# Patient Record
Sex: FEMALE | Race: BLACK OR AFRICAN AMERICAN | NOT HISPANIC OR LATINO | Employment: FULL TIME | URBAN - METROPOLITAN AREA
[De-identification: names, ages, dates, MRNs, and addresses within clinical notes are randomized per-mention and may not be internally consistent; named-entity substitution may affect disease eponyms.]

---

## 2017-05-30 ENCOUNTER — OFFICE VISIT (OUTPATIENT)
Dept: FAMILY MEDICINE | Facility: CLINIC | Age: 27
End: 2017-05-30
Payer: MEDICAID

## 2017-05-30 VITALS
DIASTOLIC BLOOD PRESSURE: 74 MMHG | HEIGHT: 66 IN | HEART RATE: 84 BPM | SYSTOLIC BLOOD PRESSURE: 115 MMHG | WEIGHT: 195 LBS | TEMPERATURE: 98.6 F | BODY MASS INDEX: 31.34 KG/M2

## 2017-05-30 DIAGNOSIS — M54.6 ACUTE LEFT-SIDED THORACIC BACK PAIN: Primary | ICD-10-CM

## 2017-05-30 PROCEDURE — 99213 OFFICE O/P EST LOW 20 MIN: CPT | Performed by: PHYSICIAN ASSISTANT

## 2017-05-30 RX ORDER — METHOCARBAMOL 500 MG/1
1000 TABLET, FILM COATED ORAL 3 TIMES DAILY PRN
Qty: 30 TABLET | Refills: 1 | Status: SHIPPED | OUTPATIENT
Start: 2017-05-30 | End: 2017-09-22

## 2017-05-30 RX ORDER — ACETAMINOPHEN 500 MG
1000 TABLET ORAL 2 TIMES DAILY PRN
Qty: 100 TABLET | Refills: 0 | Status: SHIPPED | OUTPATIENT
Start: 2017-05-30 | End: 2017-09-22

## 2017-05-30 ASSESSMENT — PAIN SCALES - GENERAL: PAINLEVEL: SEVERE PAIN (7)

## 2017-05-30 NOTE — PROGRESS NOTES
SUBJECTIVE:                                                    Peewee Wilson is a 27 year old female who presents to clinic today for the following health issues:      Back Pain      Duration: Yesterday at work        Specific cause: work-related    Description:   Location of pain: middle of back left and upper back left  Started after lifting boxes at work  Character of pain: sharp and dull ache  Pain radiation:up into the neck  New numbness or weakness in legs, not attributed to pain:  no     Intensity: Currently 7/10, At its worst 10/10    History:   Pain interferes with job: YES  History of back problems: no prior back problems  Any previous MRI or X-rays: None  Sees a specialist for back pain:  No  Therapies tried without relief: NSAIDs-not helping   Hot bath-worse     Alleviating factors:   Improved by: none      Precipitating factors:  Worsened by: Lifting and Bending    Functional and Psychosocial Screen (Marylu STarT Back):      Not performed today       Accompanying Signs & Symptoms:  Risk of Fracture:  None  Risk of Cauda Equina:  None  Risk of Infection:  None  Risk of Cancer:  None  Risk of Ankylosing Spondylitis:  Onset at age <35, male, AND morning back stiffness. no                          Problem list and histories reviewed & adjusted, as indicated.  Additional history: as documented    There is no problem list on file for this patient.    Past Surgical History:   Procedure Laterality Date     ABDOMEN SURGERY      2 c-sections     GYN SURGERY      dermoid cyst       Social History   Substance Use Topics     Smoking status: Former Smoker     Smokeless tobacco: Not on file     Alcohol use No     History reviewed. No pertinent family history.        Reviewed and updated as needed this visit by clinical staff  Tobacco  Allergies  Meds  Med Hx  Surg Hx  Fam Hx  Soc Hx      Reviewed and updated as needed this visit by Provider  Allergies         ROS:  As above    OBJECTIVE:                         "                            /74 (BP Location: Right arm, Patient Position: Chair, Cuff Size: Adult Large)  Pulse 84  Temp 98.6  F (37  C) (Oral)  Ht 5' 6\" (1.676 m)  Wt 195 lb (88.5 kg)  Breastfeeding? No  BMI 31.47 kg/m2  Body mass index is 31.47 kg/(m^2).  GENERAL: healthy, alert and no distress  RESP: lungs clear to auscultation - no rales, rhonchi or wheezes  CV: regular rates and rhythm, normal S1 S2, no S3 or S4 and no murmur, click or rub  MS: tender over left shoulder blade     Diagnostic Test Results:  none      ASSESSMENT/PLAN:                                                      1. Acute left-sided thoracic back pain  Continue symptomatic treatment.  return to clinic if not improving.     - methocarbamol (ROBAXIN) 500 MG tablet; Take 2 tablets (1,000 mg) by mouth 3 times daily as needed for muscle spasms  Dispense: 30 tablet; Refill: 1  - acetaminophen (TYLENOL) 500 MG tablet; Take 2 tablets (1,000 mg) by mouth 2 times daily as needed for mild pain  Dispense: 100 tablet; Refill: 0        Becky Gilbert PA-C  [facility]  "

## 2017-05-30 NOTE — LETTER
[address] [address]  Phone: [phone]  Fax: [phone]    May 30, 2017        Peewee Wilson  [address] [place] [id number]          To whom it may concern:    RE: Peewee Wilson    Patient was seen and treated today at our clinic and missed work.    Please contact me for questions or concerns.      Sincerely,        Becky Gilbert PA-C

## 2017-05-30 NOTE — MR AVS SNAPSHOT
"              After Visit Summary   2017    Peewee Wilson    MRN: [id number]           Patient Information     Date Of Birth          1990        Visit Information        Provider Department      2017 1:00 PM Becky Gilbert PA-C [facility]        Today's Diagnoses     Acute left-sided thoracic back pain    -  1       Follow-ups after your visit        Who to contact     If you have questions or need follow up information about today's clinic visit or your schedule please contact [facility] directly at [phone].  Normal or non-critical lab and imaging results will be communicated to you by [employer], letter or phone within 4 business days after the clinic has received the results. If you do not hear from us within 7 days, please contact the clinic through [employer] or phone. If you have a critical or abnormal lab result, we will notify you by phone as soon as possible.  Submit refill requests through [employer] or call your pharmacy and they will forward the refill request to us. Please allow 3 business days for your refill to be completed.          Additional Information About Your Visit        MyChart Information     [employer] lets you send messages to your doctor, view your test results, renew your prescriptions, schedule appointments and more. To sign up, go to [email or web address] . Click on \"Log in\" on the left side of the screen, which will take you to the Welcome page. Then click on \"Sign up Now\" on the right side of the page.     You will be asked to enter the access code listed below, as well as some personal information. Please follow the directions to create your username and password.     Your access code is: [email or web address]  Expires: 2017  1:37 PM     Your access code will  in 90 days. If you need help or a new code, please call your [facility] or [phone].        Care EveryWhere ID     This is your Care EveryWhere ID. " "This could be used by other organizations to access your [place] medical records  [id number]        Your Vitals Were     Pulse Temperature Height Breastfeeding? BMI (Body Mass Index)       84 98.6  F (37  C) (Oral) 5' 6\" (1.676 m) No 31.47 kg/m2        Blood Pressure from Last 3 Encounters:   05/30/17 115/74   10/31/16 116/73    Weight from Last 3 Encounters:   05/30/17 195 lb (88.5 kg)   10/31/16 183 lb 3.2 oz (83.1 kg)              Today, you had the following     No orders found for display         Today's Medication Changes          These changes are accurate as of: 5/30/17  1:37 PM.  If you have any questions, ask your nurse or doctor.               Start taking these medicines.        Dose/Directions    acetaminophen 500 MG tablet   Commonly known as:  TYLENOL   Used for:  Acute left-sided thoracic back pain   Started by:  Becky Gilbert PA-C        Dose:  1000 mg   Take 2 tablets (1,000 mg) by mouth 2 times daily as needed for mild pain   Quantity:  100 tablet   Refills:  0       methocarbamol 500 MG tablet   Commonly known as:  ROBAXIN   Used for:  Acute left-sided thoracic back pain   Started by:  Becky Gilbert PA-C        Dose:  1000 mg   Take 2 tablets (1,000 mg) by mouth 3 times daily as needed for muscle spasms   Quantity:  30 tablet   Refills:  1            Where to get your medicines      These medications were sent to [facility] - [place], [place] - [address]  [address], [facility] [address]     Phone:  [phone]     acetaminophen 500 MG tablet    methocarbamol 500 MG tablet                Primary Care Provider    Physician No Ref-Primary       No address on file        Thank you!     Thank you for choosing [facility]  for your care. Our goal is always to provide you with excellent care. Hearing back from our patients is one way we can continue to improve our services. Please take a few minutes to " complete the written survey that you may receive in the mail after your visit with us. Thank you!             Your Updated Medication List - Protect others around you: Learn how to safely use, store and throw away your medicines at www.disposemymeds.org.          This list is accurate as of: 5/30/17  1:37 PM.  Always use your most recent med list.                   Brand Name Dispense Instructions for use    acetaminophen 500 MG tablet    TYLENOL    100 tablet    Take 2 tablets (1,000 mg) by mouth 2 times daily as needed for mild pain       methocarbamol 500 MG tablet    ROBAXIN    30 tablet    Take 2 tablets (1,000 mg) by mouth 3 times daily as needed for muscle spasms

## 2017-05-30 NOTE — NURSING NOTE
"Chief Complaint   Patient presents with     Back Pain       Initial /74 (BP Location: Right arm, Patient Position: Chair, Cuff Size: Adult Large)  Pulse 84  Temp 98.6  F (37  C) (Oral)  Ht 5' 6\" (1.676 m)  Wt 195 lb (88.5 kg)  Breastfeeding? No  BMI 31.47 kg/m2 Estimated body mass index is 31.47 kg/(m^2) as calculated from the following:    Height as of this encounter: 5' 6\" (1.676 m).    Weight as of this encounter: 195 lb (88.5 kg).  Medication Reconciliation: complete   Soco Reed CMA       "

## 2017-09-22 ENCOUNTER — OFFICE VISIT (OUTPATIENT)
Dept: OBGYN | Facility: CLINIC | Age: 27
End: 2017-09-22
Payer: COMMERCIAL

## 2017-09-22 VITALS
WEIGHT: 195.1 LBS | HEIGHT: 66 IN | DIASTOLIC BLOOD PRESSURE: 70 MMHG | SYSTOLIC BLOOD PRESSURE: 112 MMHG | HEART RATE: 71 BPM | OXYGEN SATURATION: 99 % | BODY MASS INDEX: 31.36 KG/M2

## 2017-09-22 DIAGNOSIS — B96.89 BV (BACTERIAL VAGINOSIS): Primary | ICD-10-CM

## 2017-09-22 DIAGNOSIS — N89.8 VAGINAL DISCHARGE: ICD-10-CM

## 2017-09-22 DIAGNOSIS — K64.4 EXTERNAL HEMORRHOIDS: ICD-10-CM

## 2017-09-22 DIAGNOSIS — Z11.3 SCREEN FOR STD (SEXUALLY TRANSMITTED DISEASE): Primary | ICD-10-CM

## 2017-09-22 DIAGNOSIS — N76.0 BV (BACTERIAL VAGINOSIS): Primary | ICD-10-CM

## 2017-09-22 LAB
SPECIMEN SOURCE: ABNORMAL
WET PREP SPEC: ABNORMAL

## 2017-09-22 PROCEDURE — 86780 TREPONEMA PALLIDUM: CPT | Performed by: OBSTETRICS & GYNECOLOGY

## 2017-09-22 PROCEDURE — 36415 COLL VENOUS BLD VENIPUNCTURE: CPT | Performed by: OBSTETRICS & GYNECOLOGY

## 2017-09-22 PROCEDURE — 87491 CHLMYD TRACH DNA AMP PROBE: CPT | Performed by: OBSTETRICS & GYNECOLOGY

## 2017-09-22 PROCEDURE — 87591 N.GONORRHOEAE DNA AMP PROB: CPT | Performed by: OBSTETRICS & GYNECOLOGY

## 2017-09-22 PROCEDURE — 87529 HSV DNA AMP PROBE: CPT | Performed by: OBSTETRICS & GYNECOLOGY

## 2017-09-22 PROCEDURE — 87210 SMEAR WET MOUNT SALINE/INK: CPT | Performed by: OBSTETRICS & GYNECOLOGY

## 2017-09-22 PROCEDURE — 87389 HIV-1 AG W/HIV-1&-2 AB AG IA: CPT | Performed by: OBSTETRICS & GYNECOLOGY

## 2017-09-22 PROCEDURE — 87529 HSV DNA AMP PROBE: CPT | Mod: 91 | Performed by: OBSTETRICS & GYNECOLOGY

## 2017-09-22 PROCEDURE — 99214 OFFICE O/P EST MOD 30 MIN: CPT | Performed by: OBSTETRICS & GYNECOLOGY

## 2017-09-22 PROCEDURE — G0499 HEPB SCREEN HIGH RISK INDIV: HCPCS | Performed by: OBSTETRICS & GYNECOLOGY

## 2017-09-22 RX ORDER — METRONIDAZOLE 500 MG/1
500 TABLET ORAL 2 TIMES DAILY
Qty: 14 TABLET | Refills: 0 | Status: SHIPPED | OUTPATIENT
Start: 2017-09-22 | End: 2017-12-28

## 2017-09-22 NOTE — NURSING NOTE
"Chief Complaint   Patient presents with     STD     std screening     Consult     vaginal pain/discharge       Initial /70  Pulse 71  Ht 1.67 m (5' 5.75\")  Wt 88.5 kg (195 lb 1.6 oz)  LMP 09/16/2017  SpO2 99%  Breastfeeding? No  BMI 31.73 kg/m2 Estimated body mass index is 31.73 kg/(m^2) as calculated from the following:    Height as of this encounter: 1.67 m (5' 5.75\").    Weight as of this encounter: 88.5 kg (195 lb 1.6 oz).  Medication Reconciliation:   Shivani Valentin, [facility]  September 22, 2017 3:42 PM            "

## 2017-09-22 NOTE — PATIENT INSTRUCTIONS
If you have any questions regarding your visit, Please contact your care team.    Women s Health CLINIC HOURS TELEPHONE NUMBER   Diana DO Konrad.    [facility] -    KATHY Stuart RN       Monday, Wednesday, Thursday and Friday, [place]  8:30a.m-5:00 p.m   [facility]  [address] [place] [address]  [phone] ask for [facility]    Imaging Rqtobxfbon-[phone]       Urgent Care locations:    [facility] Saturday and Sunday   9 am - 5 pm    Monday-Friday   12 pm - 8 pm  Saturday and Sunday   9 am - 5 pm   [phone]     [facility] Labor and Delivery:  [phone]    If you need a medication refill, please contact your pharmacy. Please allow 3 business days for your refill to be completed.  As always, Thank you for trusting us with your healthcare needs!

## 2017-09-22 NOTE — PROGRESS NOTES
"This 26 y/o female, , not using contraception, who presents for a STD screening.  She is monogomous with her children's father but they break up periodically and then he \"sleeps around.\"  Therefore, she would like to be checked for STDs today but denies any known exposure.  She also denies any symptoms or pelvic pain.  She states that her labia have enlarged since pregnancy and at times feel irritated.  She also c/o hemorrhoid problems and would like a referral to have this evaluated.  Her next annual exam is due 17 so she plans to call back and schedule.  She remains undecided as to whether or not to have more children.  She declines a flu vaccine today.  /70  Pulse 71  Ht 1.67 m (5' 5.75\")  Wt 88.5 kg (195 lb 1.6 oz)  LMP 2017  SpO2 99%  Breastfeeding? No  BMI 31.73 kg/m2   ROS:  10 systems were reviewed and was + for vaginal discharge   A bi-valve speculum was placed and a wet prep and GC/Chlamydia cultures were obtained and submitted.  A small amount of white vaginal discharge was visualized but no lesions or growths were seen.  Her pelvic exam was negative for uterine tenderness or palpable adnexal mass/tenderness.  Assessment - STD screening, hemorrhoid issue  Plan - Submit wet prep and STD tests and treat if +.  We discussed safe sex measures and she is to use condoms every time.  Will refer to [facility] for hemorrhoid issue but they are not thrombosed.  She is to return in 2017 for her next annual exam.  This was a 30-minute visit and over 50% of the time was spent in direct pt consultation.  "

## 2017-09-22 NOTE — MR AVS SNAPSHOT
After Visit Summary   9/22/2017    Peewee Wilson    MRN: [id number]           Patient Information     Date Of Birth          1990        Visit Information        Provider Department      9/22/2017 3:30 PM Diana Silvestre DO [facility]        Today's Diagnoses     Screen for STD (sexually transmitted disease)    -  1    Vaginal discharge          Care Instructions                                                         If you have any questions regarding your visit, Please contact your care team.    [facility] CLINIC HOURS TELEPHONE NUMBER   Diana Silvestre DO.    [facility] -    KATHY Stuart RN       [facility], [facility]  8:30a.m-5:00 p.m   [facility]  [address] [place] [address]  [phone] ask for [facility]    Imaging Iayoimezap-[phone]       Urgent Care locations:    [facility] Saturday and Sunday   9 am - 5 pm    Monday-Friday   12 pm - 8 pm  Saturday and Sunday   9 am - 5 pm   [phone]     [facility] Labor and Delivery:  [phone]    If you need a medication refill, please contact your pharmacy. Please allow 3 business days for your refill to be completed.  As always, Thank you for trusting us with your healthcare needs!                Follow-ups after your visit        Who to contact     If you have questions or need follow up information about today's clinic visit or your schedule please contact [facility] directly at [phone].  Normal or non-critical lab and imaging results will be communicated to you by MyChart, letter or phone within 4 business days after the clinic has received the results. If you do not hear from us within 7 days, please contact the clinic through MyChart or phone. If you have a critical or abnormal lab result, we will notify you by phone as soon as  "possible.  Submit refill requests through [employer] or call your pharmacy and they will forward the refill request to us. Please allow 3 business days for your refill to be completed.          Additional Information About Your Visit        [employer] Information     [employer] lets you send messages to your doctor, view your test results, renew your prescriptions, schedule appointments and more. To sign up, go to [email or web address] . Click on \"Log in\" on the left side of the screen, which will take you to the Welcome page. Then click on \"Sign up Now\" on the right side of the page.     You will be asked to enter the access code listed below, as well as some personal information. Please follow the directions to create your username and password.     Your access code is: [email or web address]  Expires: 2017  3:42 PM     Your access code will  in 90 days. If you need help or a new code, please call your [place] clinic or [phone].        Care EveryWhere ID     This is your Care EveryWhere ID. This could be used by other organizations to access your [place] medical records  [id number]        Your Vitals Were     Pulse Height Last Period Pulse Oximetry Breastfeeding? BMI (Body Mass Index)    71 1.67 m (5' 5.75\") 2017 99% No 31.73 kg/m2       Blood Pressure from Last 3 Encounters:   17 112/70   17 115/74   10/31/16 116/73    Weight from Last 3 Encounters:   17 88.5 kg (195 lb 1.6 oz)   17 88.5 kg (195 lb)   10/31/16 83.1 kg (183 lb 3.2 oz)              We Performed the Following     Anti Treponema     Hepatitis B surface antigen     HIV Antigen Antibody Combo     HSV 1 and 2 DNA by PCR     Wet prep          Today's Medication Changes          These changes are accurate as of: 17  3:42 PM.  If you have any questions, ask your nurse or doctor.               Stop taking these medicines if you haven't already. Please contact your care team if you have questions.     acetaminophen 500 " MG tablet   Commonly known as:  TYLENOL   Stopped by:  Diana Silvestre DO           methocarbamol 500 MG tablet   Commonly known as:  ROBAXIN   Stopped by:  Diana Silvestre DO                    Primary Care Provider    [facility]       No address on file        Equal Access to Services     DAVIDMAJOR ALANIS AH: Hadii susan brooke hadmilenao Soomaali, waaxda luqadaha, qaybta kaalmada adeegyada, waxay idiin hayaan adeeg khtiaraalfredo mcgraw. So [facility] [phone].    ATENCIÓN: Si habla español, tiene a james disposición servicios gratuitos de asistencia lingüística. Llame al [phone].    We comply with applicable federal civil rights laws and [place] laws. We do not discriminate on the basis of race, color, national origin, age, disability sex, sexual orientation or gender identity.            Thank you!     Thank you for choosing [facility]  for your care. Our goal is always to provide you with excellent care. Hearing back from our patients is one way we can continue to improve our services. Please take a few minutes to complete the written survey that you may receive in the mail after your visit with us. Thank you!             Your Updated Medication List - Protect others around you: Learn how to safely use, store and throw away your medicines at www.disposemymeds.org.          This list is accurate as of: 9/22/17  3:42 PM.  Always use your most recent med list.                   Brand Name Dispense Instructions for use Diagnosis    amoxicillin-clavulanate 875-125 MG per tablet    AUGMENTIN     TK 1 T PO BID FOR 10 DAYS

## 2017-09-23 LAB — T PALLIDUM IGG+IGM SER QL: NEGATIVE

## 2017-09-24 LAB
C TRACH DNA SPEC QL NAA+PROBE: NEGATIVE
HSV1 DNA SPEC QL NAA+PROBE: NEGATIVE
HSV2 DNA SPEC QL NAA+PROBE: NEGATIVE
N GONORRHOEA DNA SPEC QL NAA+PROBE: NEGATIVE
SPECIMEN SOURCE: NORMAL

## 2017-09-25 LAB
HBV SURFACE AG SERPL QL IA: NONREACTIVE
HIV 1+2 AB+HIV1 P24 AG SERPL QL IA: NONREACTIVE

## 2017-12-28 ENCOUNTER — OFFICE VISIT (OUTPATIENT)
Dept: FAMILY MEDICINE | Facility: CLINIC | Age: 27
End: 2017-12-28
Payer: COMMERCIAL

## 2017-12-28 VITALS
OXYGEN SATURATION: 98 % | DIASTOLIC BLOOD PRESSURE: 65 MMHG | BODY MASS INDEX: 32.69 KG/M2 | TEMPERATURE: 98.2 F | HEART RATE: 82 BPM | SYSTOLIC BLOOD PRESSURE: 110 MMHG | WEIGHT: 201 LBS

## 2017-12-28 DIAGNOSIS — N92.6 MISSED PERIOD: Primary | ICD-10-CM

## 2017-12-28 DIAGNOSIS — Z32.01 PREGNANCY CONFIRMED BY POSITIVE URINE TEST: ICD-10-CM

## 2017-12-28 LAB — BETA HCG QUAL IFA URINE: POSITIVE

## 2017-12-28 PROCEDURE — 99214 OFFICE O/P EST MOD 30 MIN: CPT | Performed by: FAMILY MEDICINE

## 2017-12-28 PROCEDURE — 84703 CHORIONIC GONADOTROPIN ASSAY: CPT | Performed by: FAMILY MEDICINE

## 2017-12-28 RX ORDER — PRENATAL VIT/IRON FUM/FOLIC AC 27MG-0.8MG
1 TABLET ORAL DAILY
Qty: 100 TABLET | Refills: 3 | Status: SHIPPED | OUTPATIENT
Start: 2017-12-28 | End: 2019-02-21

## 2017-12-28 ASSESSMENT — PAIN SCALES - GENERAL: PAINLEVEL: SEVERE PAIN (6)

## 2017-12-28 NOTE — NURSING NOTE
"Chief Complaint   Patient presents with     Abdominal Pain     Positive pregnancy test last night     Pelvic Pain       Initial /65 (BP Location: Left arm, Patient Position: Chair, Cuff Size: Adult Regular)  Pulse 82  Temp 98.2  F (36.8  C) (Oral)  Wt 201 lb (91.2 kg)  SpO2 98%  BMI 32.69 kg/m2 Estimated body mass index is 32.69 kg/(m^2) as calculated from the following:    Height as of 9/22/17: 5' 5.75\" (1.67 m).    Weight as of this encounter: 201 lb (91.2 kg).  Medication Reconciliation: complete   Alejandra Giang CMA       "

## 2017-12-28 NOTE — MR AVS SNAPSHOT
After Visit Summary   12/28/2017    Peewee Wilson    MRN: [id number]           Patient Information     Date Of Birth          1990        Visit Information        Provider Department      12/28/2017 3:00 PM Hussain Gracia MD [facility]        Today's Diagnoses     Missed period    -  1    Pregnancy confirmed by positive urine test           Follow-ups after your visit        Follow-up notes from your care team     Return if symptoms worsen or fail to improve.      Who to contact     If you have questions or need follow up information about today's clinic visit or your schedule please contact [facility] directly at [phone].  Normal or non-critical lab and imaging results will be communicated to you by [employer], letter or phone within 4 business days after the clinic has received the results. If you do not hear from us within 7 days, please contact the clinic through [employer] or phone. If you have a critical or abnormal lab result, we will notify you by phone as soon as possible.  Submit refill requests through [employer] or call your pharmacy and they will forward the refill request to us. Please allow 3 business days for your refill to be completed.          Additional Information About Your Visit        MyChart Information     [employer] gives you secure access to your electronic health record. If you see a primary care provider, you can also send messages to your care team and make appointments. If you have questions, please call your primary care clinic.  If you do not have a primary care provider, please call [phone] and they will assist you.        Care EveryWhere ID     This is your Care EveryWhere ID. This could be used by other organizations to access your [place] medical records  [id number]        Your Vitals Were     Pulse Temperature Pulse Oximetry BMI (Body Mass Index)          82 98.2  F (36.8  C) (Oral) 98% 32.69 kg/m2          Blood Pressure from Last 3 Encounters:   12/28/17 110/65   09/22/17 112/70   05/30/17 115/74    Weight from Last 3 Encounters:   12/28/17 201 lb (91.2 kg)   09/22/17 195 lb 1.6 oz (88.5 kg)   05/30/17 195 lb (88.5 kg)              We Performed the Following     Beta HCG qual IFA urine - [facility] and [place]          Today's Medication Changes          These changes are accurate as of: 12/28/17  3:39 PM.  If you have any questions, ask your nurse or doctor.               Start taking these medicines.        Dose/Directions    prenatal multivitamin plus iron 27-0.8 MG Tabs per tablet   Used for:  Pregnancy confirmed by positive urine test   Started by:  Hussain Gracia MD        Dose:  1 tablet   Take 1 tablet by mouth daily   Quantity:  100 tablet   Refills:  3            Where to get your medicines      These medications were sent to [facility] - [place], [place] - [address]  [address], [facility] [address]     Phone:  [phone]     prenatal multivitamin plus iron 27-0.8 MG Tabs per tablet                Primary Care Provider Office Phone # Fax #    [facility] [phone]       [address]  [facility] [address]        Equal Access to Services     RUPAL THAPA AH: Hadii susan ku hadasho Soomaali, waaxda luqadaha, qaybta kaalmada adeegyada, waxay idiin hayaan boris khcezar mcgraw. So [facility] [phone].    ATENCIÓN: Si habla español, tiene a james disposición servicios gratuitos de asistencia lingüística. Llame al [phone].    We comply with applicable federal civil rights laws and [place] laws. We do not discriminate on the basis of race, color, national origin, age, disability, sex, sexual orientation, or gender identity.            Thank you!     Thank you for choosing [facility]  for your care. Our goal is always to provide you with excellent care. Hearing back from our patients is one way  we can continue to improve our services. Please take a few minutes to complete the written survey that you may receive in the mail after your visit with us. Thank you!             Your Updated Medication List - Protect others around you: Learn how to safely use, store and throw away your medicines at www.disposemymeds.org.          This list is accurate as of: 12/28/17  3:39 PM.  Always use your most recent med list.                   Brand Name Dispense Instructions for use Diagnosis    prenatal multivitamin plus iron 27-0.8 MG Tabs per tablet     100 tablet    Take 1 tablet by mouth daily    Pregnancy confirmed by positive urine test

## 2017-12-28 NOTE — PROGRESS NOTES
SUBJECTIVE:   Peewee Wilson is a 27 year old female who presents to clinic today for the following health issues:      Concern - Abdominal cramping, tightness  Onset: 2 weeks    Description:   Positive home pregnancy test last night    Intensity: moderate    Progression of Symptoms:  same    Accompanying Signs & Symptoms:  None- comes and goes    Previous history of similar problem:       Precipitating factors:   Worsened by:     Alleviating factors:  Improved by:     Therapies Tried and outcome: None    She has had some mild lower abdominal/pelvic discomfort and cramping off and on in the last couple of weeks.  Her last menstrual period was November 28.  She normally has a menstrual period every 4 weeks or so.  She was expecting her menstrual period around Cyndi day, which was 3 days ago, but it did not happen.  She did a couple of home pregnancy tests yesterday which were both positive.  She has 2 children.  They will be 7 and 8 years of age this year.  She is not , but is still in a relationship with the father of these 2 children and he would also be the father of any new pregnancy for her.  She is not having any vaginal bleeding in the last week or 2.  She is on no routine prescription medications.  She is not taking any prenatal vitamins.    Problem list and histories reviewed & adjusted, as indicated.  Additional history: as documented    There is no problem list on file for this patient.    Past Surgical History:   Procedure Laterality Date     ABDOMEN SURGERY      2 c-sections     GYN SURGERY      dermoid cyst       Social History   Substance Use Topics     Smoking status: Former Smoker     Smokeless tobacco: Never Used     Alcohol use No     History reviewed. No pertinent family history.      Current Outpatient Prescriptions   Medication Sig Dispense Refill    none        No Known Allergies      Reviewed and updated as needed this visit by clinical staffTobacco  Allergies  Meds  Med Hx   "Surg Hx  Fam Hx  Soc Hx      Reviewed and updated as needed this visit by Provider         ROS:  Noncontributory except as above    OBJECTIVE:     /65 (BP Location: Left arm, Patient Position: Chair, Cuff Size: Adult Regular)  Pulse 82  Temp 98.2  F (36.8  C) (Oral)  Wt 201 lb (91.2 kg)  SpO2 98%  BMI 32.69 kg/m2  Body mass index is 32.69 kg/(m^2).  GENERAL: alert, no distress and obese  ABDOMEN: Soft without any specific localizing tenderness to palpation over the lower abdomen or suprapubic region.  She points to the suprapubic region as where she has had some \"tightness\" recently.  No guarding or rebound or mass there.    Diagnostic Test Results:  Office Visit on 12/28/2017   Component Date Value Ref Range Status     Beta HCG Qual IFA Urine 12/28/2017 Positive* NEG^Negative    Final     ]    ASSESSMENT/PLAN:       ICD-10-CM    1. Missed period N92.6 Beta HCG qual IFA urine - [facility] and [place]   2. Pregnancy confirmed by positive urine test Z32.01 Prenatal Vit-Fe Fumarate-FA (PRENATAL MULTIVITAMIN PLUS IRON) 27-0.8 MG TABS per tablet     We did confirm a new diagnosis of pregnancy for her  Her LMP was 11/28 so her EDC is about 9/4 of 2018  I reviewed some routine guidelines for care of an early pregnancy including the initiation of prenatal vitamins for her  She had her previous pregnancies cared for by [facility], but I gave her some options locally through our [facility] for care of her pregnancy and I advised her to follow-up with an obstetrician in the upcoming weeks for ongoing care of this pregnancy        Hussain Gracia MD  [facility]  "

## 2018-01-12 ENCOUNTER — PRENATAL OFFICE VISIT (OUTPATIENT)
Dept: OBGYN | Facility: CLINIC | Age: 28
End: 2018-01-12
Payer: COMMERCIAL

## 2018-01-12 VITALS
HEART RATE: 74 BPM | WEIGHT: 199.1 LBS | DIASTOLIC BLOOD PRESSURE: 74 MMHG | BODY MASS INDEX: 32 KG/M2 | HEIGHT: 66 IN | SYSTOLIC BLOOD PRESSURE: 121 MMHG

## 2018-01-12 DIAGNOSIS — O34.219 HISTORY OF CESAREAN DELIVERY, CURRENTLY PREGNANT: Primary | ICD-10-CM

## 2018-01-12 LAB
ALBUMIN UR-MCNC: NEGATIVE MG/DL
APPEARANCE UR: CLEAR
BACTERIA #/AREA URNS HPF: ABNORMAL /HPF
BILIRUB UR QL STRIP: NEGATIVE
COLOR UR AUTO: ABNORMAL
ERYTHROCYTE [DISTWIDTH] IN BLOOD BY AUTOMATED COUNT: 12.2 % (ref 10–15)
GLUCOSE UR STRIP-MCNC: NEGATIVE MG/DL
HBA1C MFR BLD: 4.9 % (ref 4.3–6)
HCT VFR BLD AUTO: 37.7 % (ref 35–47)
HGB BLD-MCNC: 12.5 G/DL (ref 11.7–15.7)
HGB UR QL STRIP: NEGATIVE
KETONES UR STRIP-MCNC: NEGATIVE MG/DL
LEUKOCYTE ESTERASE UR QL STRIP: NEGATIVE
MCH RBC QN AUTO: 28.7 PG (ref 26.5–33)
MCHC RBC AUTO-ENTMCNC: 33.2 G/DL (ref 31.5–36.5)
MCV RBC AUTO: 87 FL (ref 78–100)
NITRATE UR QL: NEGATIVE
NON-SQ EPI CELLS #/AREA URNS LPF: ABNORMAL /LPF
PH UR STRIP: 6 PH (ref 5–7)
PLATELET # BLD AUTO: 293 10E9/L (ref 150–450)
RBC # BLD AUTO: 4.36 10E12/L (ref 3.8–5.2)
RBC #/AREA URNS AUTO: ABNORMAL /HPF
SOURCE: ABNORMAL
SP GR UR STRIP: 1.01 (ref 1–1.03)
UROBILINOGEN UR STRIP-MCNC: NORMAL MG/DL (ref 0–2)
WBC # BLD AUTO: 9.3 10E9/L (ref 4–11)
WBC #/AREA URNS AUTO: ABNORMAL /HPF

## 2018-01-12 PROCEDURE — 86850 RBC ANTIBODY SCREEN: CPT | Performed by: OBSTETRICS & GYNECOLOGY

## 2018-01-12 PROCEDURE — 83036 HEMOGLOBIN GLYCOSYLATED A1C: CPT | Performed by: OBSTETRICS & GYNECOLOGY

## 2018-01-12 PROCEDURE — 86762 RUBELLA ANTIBODY: CPT | Performed by: OBSTETRICS & GYNECOLOGY

## 2018-01-12 PROCEDURE — 87086 URINE CULTURE/COLONY COUNT: CPT | Performed by: OBSTETRICS & GYNECOLOGY

## 2018-01-12 PROCEDURE — 86900 BLOOD TYPING SEROLOGIC ABO: CPT | Performed by: OBSTETRICS & GYNECOLOGY

## 2018-01-12 PROCEDURE — 99207 ZZC FIRST OB VISIT: CPT | Performed by: OBSTETRICS & GYNECOLOGY

## 2018-01-12 PROCEDURE — 85027 COMPLETE CBC AUTOMATED: CPT | Performed by: OBSTETRICS & GYNECOLOGY

## 2018-01-12 PROCEDURE — G0499 HEPB SCREEN HIGH RISK INDIV: HCPCS | Performed by: OBSTETRICS & GYNECOLOGY

## 2018-01-12 PROCEDURE — 86901 BLOOD TYPING SEROLOGIC RH(D): CPT | Performed by: OBSTETRICS & GYNECOLOGY

## 2018-01-12 PROCEDURE — 36415 COLL VENOUS BLD VENIPUNCTURE: CPT | Performed by: OBSTETRICS & GYNECOLOGY

## 2018-01-12 PROCEDURE — 81001 URINALYSIS AUTO W/SCOPE: CPT | Performed by: OBSTETRICS & GYNECOLOGY

## 2018-01-12 PROCEDURE — 87591 N.GONORRHOEAE DNA AMP PROB: CPT | Performed by: OBSTETRICS & GYNECOLOGY

## 2018-01-12 PROCEDURE — 87491 CHLMYD TRACH DNA AMP PROBE: CPT | Performed by: OBSTETRICS & GYNECOLOGY

## 2018-01-12 PROCEDURE — 87389 HIV-1 AG W/HIV-1&-2 AB AG IA: CPT | Performed by: OBSTETRICS & GYNECOLOGY

## 2018-01-12 PROCEDURE — 86780 TREPONEMA PALLIDUM: CPT | Performed by: OBSTETRICS & GYNECOLOGY

## 2018-01-12 ASSESSMENT — PATIENT HEALTH QUESTIONNAIRE - PHQ9: SUM OF ALL RESPONSES TO PHQ QUESTIONS 1-9: 9

## 2018-01-12 NOTE — PROGRESS NOTES
27 year old  at 6w3d presents for New OB appointment.  Estimated Date of Delivery: Sep 4, 2018 by LMP.  Patient is having some problems with N&V.  She is having vaginal discharge, no itching or burning or other symptoms.   No vaginal bleeding, no leaking fluid, no contractions.      Electronic chart, including labs and imaging reviewed.  Patient states she had a problem with her epidural, but spinals worked in the past.      Last PHQ-9 score on record=   PHQ-9 SCORE 2018   Total Score 9       Obstetric History  Obstetric History       T2      L2     SAB0   TAB0   Ectopic0   Multiple0   Live Births2       # Outcome Date GA Lbr Shar/2nd Weight Sex Delivery Anes PTL Lv   7 Current            6 Term 11 39w1d  8 lb 2 oz (3.685 kg) F CS-LTranv Spinal  RAMEZ      Name: Johnathana      Apgar1:  7                Apgar5: 8   5 Term 06/04/10 41w3d  8 lb 7 oz (3.827 kg) M CS-LTranv Spinal,EPI  RAMEZ      Name: Ralph      Apgar1:  2                Apgar5: 8   4 AB            3 AB            2 AB            1 AB                   Gynecologic History    Last Pap:   Lab Results   Component Value Date    PAP NIL 10/31/2016       Most Recent Immunizations   Administered Date(s) Administered     HPV Quadrivalent 2011     Influenza Vaccine IM 3yrs+ 4 Valent IIV4 10/31/2016     Pneumococcal 23 valent 2011     TDAP Vaccine (Adacel) 2010        Patient Active Problem List   Diagnosis     History of  delivery, currently pregnant       Current Outpatient Prescriptions   Medication     Prenatal Vit-Fe Fumarate-FA (PRENATAL MULTIVITAMIN PLUS IRON) 27-0.8 MG TABS per tablet     No current facility-administered medications for this visit.        No Known Allergies    History  Past Medical History:   Diagnosis Date     Asthma      Migraines      Past Surgical History:   Procedure Laterality Date     ABDOMEN SURGERY      2 c-sections     GYN SURGERY      dermoid cyst   Laparotomy in  for 8  "cm dermoid with WILTON.      Social History     Social History     Marital status: Single     Spouse name: N/A     Number of children: N/A     Years of education: N/A     Occupational History     Not on file.     Social History Main Topics     Smoking status: Former Smoker     Smokeless tobacco: Never Used     Alcohol use No     Drug use: No     Sexual activity: Yes     Partners: Male     Birth control/ protection: None     Other Topics Concern     Parent/Sibling W/ Cabg, Mi Or Angioplasty Before 65f 55m? No     Social History Narrative       ROS - Please see HPI, otherwise 10pt ROS negative.      Physical Exam  Vitals: /74 (BP Location: Right arm, Patient Position: Chair, Cuff Size: Adult Regular)  Pulse 74  Ht 5' 5.95\" (1.675 m)  Wt 199 lb 1.6 oz (90.3 kg)  LMP 2017 (Approximate)  BMI 32.19 kg/m2  BMI= Body mass index is 32.19 kg/(m^2).  Gen: Alert and oriented times 3, no acute distress.  Well developed, well nourished, pleasant.    Neck: Supple, no masses.  No thyromegaly.  Chest:  Non labored.  Clear to auscultation bilaterally.    Heart: Regular, normal S1, S2.  No murmurs.   Abdomen: Soft, nontender, nondistended.  No palpable masses.    :  Normal female external genitalia, Harsh stage V.  No lesions.  Speculum exam reveals a normal vaginal vault, normal cervix.  No abnormal discharge.  Bimanual exam reveals a normal, mobile, nontender uterus, size consistent with dates.  No cervical motion tenderness.  Adnexa nontender with no palpable masses.   Extremities:  Nontender, no edema.    Pap obtained No      Assessment and Plan:  27 year old  with IUP at 6w3d.      ICD-10-CM    1. History of  delivery, currently pregnant O34.219 CBC with Platelets     HIV Antigen Antibody Combo     Rubella Antibody IgG Quantitative     Hepatitis B surface antigen     Anti Treponema     ABO/RH Type and Screen     Urine Culture Aerobic Bacterial     Neisseria Gonorrhoea PCR     Chlamydia Tracomatis " PCR     UA with Microscopic (Jenniffer Thapa; [facility])     US Pelvic Complete with Transvaginal     Antibody screen red cell   2. BMI 32.0-32.9,adult Z68.32 Hemoglobin A1c       History of Asthma - no problems in the last few years.    History of migraines    Discussed:    Genetic screening options:  undecided    N&V management options.     Benefits of breastfeeding.  She did not breastfeed in the past.      Physician coverage, exercise, weight gain, schedule of visits, routine and indicated ultrasounds, prenatal vitamins and childbirth education.      Body mass index is 32.19 kg/(m^2). - recommend 11-20 lbs (BMI >30)    Return in 4 weeks for routine OB care      30 minutes was spent face to face with the patient today discussing her history, diagnosis, and follow-up plan as noted above.  Over 50% of the visit was spent in counseling and coordination of care.    Shivani Pa MD FACOG

## 2018-01-12 NOTE — NURSING NOTE
"Chief Complaint   Patient presents with     Prenatal Care       Initial /74 (BP Location: Right arm, Patient Position: Chair, Cuff Size: Adult Regular)  Pulse 74  Ht 1.675 m (5' 5.95\")  Wt 90.3 kg (199 lb 1.6 oz)  LMP 11/28/2017 (Approximate)  BMI 32.19 kg/m2 Estimated body mass index is 32.19 kg/(m^2) as calculated from the following:    Height as of this encounter: 1.675 m (5' 5.95\").    Weight as of this encounter: 90.3 kg (199 lb 1.6 oz).  Medication Reconciliation: complete     Hannah Garrett, [facility]  January 12, 2018      "

## 2018-01-12 NOTE — MR AVS SNAPSHOT
After Visit Summary   2018    Peewee Wilson    MRN: [id number]           Patient Information     Date Of Birth          1990        Visit Information        Provider Department      2018 2:00 PM Shivani Pa MD [facility]        Today's Diagnoses     History of  delivery, currently pregnant    -  1      Care Instructions      Treatment of Nausea and Vomiting in Pregnancy    Stop prenatal vitamin and start a folic acid supplement only until nausea improves (folic acid 400 micrograms by mouth daily)    Ginger capsules 250 mg by mouth daily     Consider acupressure with wrist bands    Vitamin B6 (pyridoxine) 10-25 mg by mouth 3-4 times per day.  This may be taken in combination with doxylamine.  Doxylamine 25 mg by mouth every night before bed.  You may also take doxylamine 12.5 mg (half tab) in the am and in the afternoon as needed.      It is important to stay hydrated.  Please let us know if your symptoms worsen or do not improve with the treatment plan listed above.              Follow-ups after your visit        Future tests that were ordered for you today     Open Future Orders        Priority Expected Expires Ordered    US Pelvic Complete with Transvaginal Routine  2018            Who to contact     If you have questions or need follow up information about today's clinic visit or your schedule please contact [facility] directly at [phone].  Normal or non-critical lab and imaging results will be communicated to you by MyChart, letter or phone within 4 business days after the clinic has received the results. If you do not hear from us within 7 days, please contact the clinic through MyChart or phone. If you have a critical or abnormal lab result, we will notify you by phone as soon as possible.  Submit refill requests through [employer] or call your pharmacy and they will forward the refill request to us. Please allow  "3 business days for your refill to be completed.          Additional Information About Your Visit        MyChart Information     [employer] gives you secure access to your electronic health record. If you see a primary care provider, you can also send messages to your care team and make appointments. If you have questions, please call your primary care clinic.  If you do not have a primary care provider, please call [phone] and they will assist you.        Care EveryWhere ID     This is your Care EveryWhere ID. This could be used by other organizations to access your [facility] medical records  [id number]        Your Vitals Were     Pulse Height Last Period BMI (Body Mass Index)          74 1.675 m (5' 5.95\") 11/28/2017 (Approximate) 32.19 kg/m2         Blood Pressure from Last 3 Encounters:   01/12/18 121/74   12/28/17 110/65   09/22/17 112/70    Weight from Last 3 Encounters:   01/12/18 90.3 kg (199 lb 1.6 oz)   12/28/17 91.2 kg (201 lb)   09/22/17 88.5 kg (195 lb 1.6 oz)              We Performed the Following     ABO/RH Type and Screen     Anti Treponema     CBC with Platelets     Chlamydia Tracomatis PCR     Hepatitis B surface antigen     HIV Antigen Antibody Combo     Neisseria Gonorrhoea PCR     Rubella Antibody IgG Quantitative     UA with Microscopic ([place]; [facility])     Urine Culture Aerobic Bacterial        Primary Care Provider Office Phone # Fax #    [facility] [phone]       [address]  [facility] [address]        Equal Access to Services     [facility] CenterHE[facility]Y : Hadii aad ku hadasho Soomaali, waaxda luqadaha, qaybta kaalmada adeegyada, waxay idiin haygemman boris albert la'velia . So [facility] [phone].    ATENCIÓN: Si habla español, tiene a james disposición servicios gratuitos de asistencia lingüística. Llame al [phone].    We comply with applicable federal civil rights laws and [place] laws. We do not discriminate on the basis of race, " color, national origin, age, disability, sex, sexual orientation, or gender identity.            Thank you!     Thank you for choosing [facility]  for your care. Our goal is always to provide you with excellent care. Hearing back from our patients is one way we can continue to improve our services. Please take a few minutes to complete the written survey that you may receive in the mail after your visit with us. Thank you!             Your Updated Medication List - Protect others around you: Learn how to safely use, store and throw away your medicines at www.disposemymeds.org.          This list is accurate as of: 1/12/18  2:17 PM.  Always use your most recent med list.                   Brand Name Dispense Instructions for use Diagnosis    prenatal multivitamin plus iron 27-0.8 MG Tabs per tablet     100 tablet    Take 1 tablet by mouth daily    Pregnancy confirmed by positive urine test

## 2018-01-12 NOTE — PATIENT INSTRUCTIONS
Treatment of Nausea and Vomiting in Pregnancy    Stop prenatal vitamin and start a folic acid supplement only until nausea improves (folic acid 400 micrograms by mouth daily)    Ginger capsules 250 mg by mouth daily     Consider acupressure with wrist bands    Vitamin B6 (pyridoxine) 10-25 mg by mouth 3-4 times per day.  This may be taken in combination with doxylamine.  Doxylamine 25 mg by mouth every night before bed.  You may also take doxylamine 12.5 mg (half tab) in the am and in the afternoon as needed.      It is important to stay hydrated.  Please let us know if your symptoms worsen or do not improve with the treatment plan listed above.

## 2018-01-13 LAB
ABO + RH BLD: NORMAL
ABO + RH BLD: NORMAL
BACTERIA SPEC CULT: NORMAL
BLD GP AB SCN SERPL QL: NORMAL
BLD GP AB SCN SERPL QL: NORMAL
BLOOD BANK CMNT PATIENT-IMP: NORMAL
SPECIMEN EXP DATE BLD: NORMAL
SPECIMEN SOURCE: NORMAL
T PALLIDUM IGG+IGM SER QL: NEGATIVE

## 2018-01-14 LAB
C TRACH DNA SPEC QL NAA+PROBE: NEGATIVE
N GONORRHOEA DNA SPEC QL NAA+PROBE: NEGATIVE
SPECIMEN SOURCE: NORMAL
SPECIMEN SOURCE: NORMAL

## 2018-01-15 ENCOUNTER — RADIANT APPOINTMENT (OUTPATIENT)
Dept: ULTRASOUND IMAGING | Facility: CLINIC | Age: 28
End: 2018-01-15
Attending: OBSTETRICS & GYNECOLOGY
Payer: COMMERCIAL

## 2018-01-15 DIAGNOSIS — O34.219 HISTORY OF CESAREAN DELIVERY, CURRENTLY PREGNANT: ICD-10-CM

## 2018-01-15 LAB
HBV SURFACE AG SERPL QL IA: NONREACTIVE
HIV 1+2 AB+HIV1 P24 AG SERPL QL IA: NONREACTIVE
RUBV IGG SERPL IA-ACNC: 26 IU/ML

## 2018-01-15 PROCEDURE — 76817 TRANSVAGINAL US OBSTETRIC: CPT | Performed by: RADIOLOGY

## 2018-01-15 PROCEDURE — 76801 OB US < 14 WKS SINGLE FETUS: CPT | Performed by: RADIOLOGY

## 2018-01-16 ENCOUNTER — MYC MEDICAL ADVICE (OUTPATIENT)
Dept: OBGYN | Facility: CLINIC | Age: 28
End: 2018-01-16

## 2018-01-17 ENCOUNTER — OFFICE VISIT (OUTPATIENT)
Dept: FAMILY MEDICINE | Facility: CLINIC | Age: 28
End: 2018-01-17
Payer: COMMERCIAL

## 2018-01-17 VITALS
SYSTOLIC BLOOD PRESSURE: 125 MMHG | DIASTOLIC BLOOD PRESSURE: 73 MMHG | TEMPERATURE: 99 F | HEART RATE: 97 BPM | BODY MASS INDEX: 32.24 KG/M2 | HEIGHT: 66 IN | WEIGHT: 200.6 LBS

## 2018-01-17 DIAGNOSIS — B37.0 THRUSH: Primary | ICD-10-CM

## 2018-01-17 PROCEDURE — 99213 OFFICE O/P EST LOW 20 MIN: CPT | Performed by: PHYSICIAN ASSISTANT

## 2018-01-17 RX ORDER — NYSTATIN 100000/ML
500000 SUSPENSION, ORAL (FINAL DOSE FORM) ORAL 4 TIMES DAILY
Qty: 200 ML | Refills: 0 | Status: SHIPPED | OUTPATIENT
Start: 2018-01-17 | End: 2018-01-27

## 2018-01-17 RX ORDER — PNV NO.118/IRON FUMARATE/FA 29 MG-1 MG
TABLET,CHEWABLE ORAL
COMMUNITY
End: 2019-02-21

## 2018-01-17 NOTE — PROGRESS NOTES
SUBJECTIVE:   Peewee Wilson is a 28 year old female who presents to clinic today for the following health issues:      Concern - Tongue and Dry Mouth  Onset: 7 weeks    Description:   Pt has white spots on her tongue and the back of her mouth that are rough. She said her mouth is extrememly dry. Pt reports no pain but is having a nasty taste in her mouth. Pt states it is hard to eat food.     Intensity: mild, moderate    Progression of Symptoms:  worsening    Accompanying Signs & Symptoms:  none    Previous history of similar problem:   none    Precipitating factors:   Worsened by: eating food and drinking     Alleviating factors:  Improved by: none    Therapies Tried and outcome: none    - Significant dry mouth with white patches for the past 6-7 weeks  - White patches are not painful  - Bad taste in mouth, especially when eating  - No headahes, jaw pain, or fevers  - No history of dental infections   - Has not been on antibiotics or used someone's inhaler recently    - 7 weeks pregnant       Problem list and histories reviewed & adjusted, as indicated.  Additional history: as documented    Patient Active Problem List   Diagnosis     History of  delivery, currently pregnant     Past Surgical History:   Procedure Laterality Date     ABDOMEN SURGERY      2 c-sections     GYN SURGERY      dermoid cyst       Social History   Substance Use Topics     Smoking status: Former Smoker     Smokeless tobacco: Never Used     Alcohol use No     History reviewed. No pertinent family history.      Current Outpatient Prescriptions   Medication Sig Dispense Refill     Prenatal Vit-Fe Fumarate-FA (PRENATAL 19) CHEW        nystatin (MYCOSTATIN) 953011 UNIT/ML suspension Take 5 mLs (500,000 Units) by mouth 4 times daily for 10 days 200 mL 0     Prenatal Vit-Fe Fumarate-FA (PRENATAL MULTIVITAMIN PLUS IRON) 27-0.8 MG TABS per tablet Take 1 tablet by mouth daily (Patient not taking: Reported on 2018) 100 tablet 3     Recent  "Labs   Lab Test  01/12/18   1431   A1C  4.9      BP Readings from Last 3 Encounters:   01/17/18 125/73   01/12/18 121/74   12/28/17 110/65    Wt Readings from Last 3 Encounters:   01/17/18 200 lb 9.6 oz (91 kg)   01/12/18 199 lb 1.6 oz (90.3 kg)   12/28/17 201 lb (91.2 kg)                          Reviewed and updated as needed this visit by clinical staffTobacco  Allergies  Meds  Problems  Med Hx  Surg Hx  Fam Hx  Soc Hx        Reviewed and updated as needed this visit by Provider  Allergies  Meds  Problems         ROS:  Constitutional, HEENT, cardiovascular, pulmonary, gi and gu systems are negative, except as otherwise noted.      OBJECTIVE:   /73 (BP Location: Left arm, Patient Position: Chair, Cuff Size: Adult Large)  Pulse 97  Temp 99  F (37.2  C) (Oral)  Ht 5' 5.95\" (1.675 m)  Wt 200 lb 9.6 oz (91 kg)  LMP 11/28/2017 (Approximate)  BMI 32.43 kg/m2  Body mass index is 32.43 kg/(m^2).  GENERAL: healthy, alert and no distress  EYES: Eyes grossly normal to inspection, PERRL and conjunctivae and sclerae normal  HENT: normal cephalic/atraumatic, mild, removable white coating on tongue; oral mucous membranes moist  NECK: no adenopathy, no asymmetry, masses, or scars and thyroid normal to palpation      ASSESSMENT/PLAN:         ICD-10-CM    1. Thrush B37.0 nystatin (MYCOSTATIN) 941795 UNIT/ML suspension     Appears to be a very mild case of oral candidiasis. Unclear etiology. Possibly related to pregnancy. Will treat with nystatin suspension for 10 days. If no improvement, will re-assess and possibly send to ENT.     TRA ModiS    Hannah Dash PA-C  [facility]  The student Didi Hinojosa PAS2 acted as a scribe and the encounter documented above was completely performed by myself and the documentation reflects the work I have performed today.   Hannah Dash PA-C     "

## 2018-01-17 NOTE — MR AVS SNAPSHOT
After Visit Summary   1/17/2018    Peewee Wilson    MRN: [id number]           Patient Information     Date Of Birth          1990        Visit Information        Provider Department      1/17/2018 8:40 AM Hannah Dash PA-C [facility]        Today's Diagnoses     Thrush    -  1       Follow-ups after your visit        Your next 10 appointments already scheduled     Feb 09, 2018 10:45 AM CST   ESTABLISHED PRENATAL with Shivani Pa MD   [facility]    [id number] [place] [address] [phone]              Who to contact     If you have questions or need follow up information about today's clinic visit or your schedule please contact [facility] directly at [phone].  Normal or non-critical lab and imaging results will be communicated to you by MyChart, letter or phone within 4 business days after the clinic has received the results. If you do not hear from us within 7 days, please contact the clinic through MyChart or phone. If you have a critical or abnormal lab result, we will notify you by phone as soon as possible.  Submit refill requests through [employer] or call your pharmacy and they will forward the refill request to us. Please allow 3 business days for your refill to be completed.          Additional Information About Your Visit        MyChart Information     [employer] gives you secure access to your electronic health record. If you see a primary care provider, you can also send messages to your care team and make appointments. If you have questions, please call your primary care clinic.  If you do not have a primary care provider, please call [phone] and they will assist you.        Care EveryWhere ID     This is your Care EveryWhere ID. This could be used by other organizations to access your [facility] medical records  [id number]        Your Vitals Were     Pulse  "Temperature Height Last Period BMI (Body Mass Index)       97 99  F (37.2  C) (Oral) 5' 5.95\" (1.675 m) 11/28/2017 (Approximate) 32.43 kg/m2        Blood Pressure from Last 3 Encounters:   01/17/18 125/73   01/12/18 121/74   12/28/17 110/65    Weight from Last 3 Encounters:   01/17/18 200 lb 9.6 oz (91 kg)   01/12/18 199 lb 1.6 oz (90.3 kg)   12/28/17 201 lb (91.2 kg)              Today, you had the following     No orders found for display         Today's Medication Changes          These changes are accurate as of: 1/17/18  9:03 AM.  If you have any questions, ask your nurse or doctor.               Start taking these medicines.        Dose/Directions    nystatin 547103 UNIT/ML suspension   Commonly known as:  MYCOSTATIN   Used for:  Thrush   Started by:  Hannah Dash PA-C        Dose:  [id number] Units   Take 5 mLs (500,000 Units) by mouth 4 times daily for 10 days   Quantity:  200 mL   Refills:  0            Where to get your medicines      These medications were sent to [facility] [place], [place] - [address]  [address], [facility] [address]     Phone:  [phone]     nystatin 208765 UNIT/ML suspension                Primary Care Provider Office Phone # Fax #    [facility] [phone]       [address]  [facility] [address]        Equal Access to Services     MAJOR THAPA AH: Hadii susan ku hadasho Soomaali, waaxda luqadaha, qaybta kaalmada adeegyada, waxay hank landaverde . So [facility] [phone].    ATENCIÓN: Si zairala yoli, tiene a james disposición servicios gratuitos de asistencia lingüística. Llame al [phone].    We comply with applicable federal civil rights laws and [place] laws. We do not discriminate on the basis of race, color, national origin, age, disability, sex, sexual orientation, or gender identity.            Thank you!     Thank you for choosing [facility]  for your care. Our goal is always to provide you with excellent care. Hearing back from our patients is one way we can continue to improve our services. Please take a few minutes to complete the written survey that you may receive in the mail after your visit with us. Thank you!             Your Updated Medication List - Protect others around you: Learn how to safely use, store and throw away your medicines at www.disposemymeds.org.          This list is accurate as of: 1/17/18  9:03 AM.  Always use your most recent med list.                   Brand Name Dispense Instructions for use Diagnosis    nystatin 016039 UNIT/ML suspension    MYCOSTATIN    200 mL    Take 5 mLs (500,000 Units) by mouth 4 times daily for 10 days    Thrush       * PRENATAL 19 Chew           * prenatal multivitamin plus iron 27-0.8 MG Tabs per tablet     100 tablet    Take 1 tablet by mouth daily    Pregnancy confirmed by positive urine test       * Notice:  This list has 2 medication(s) that are the same as other medications prescribed for you. Read the directions carefully, and ask your doctor or other care provider to review them with you.

## 2018-01-17 NOTE — NURSING NOTE
"Chief Complaint   Patient presents with     Other     Dry mouth and white tongue     Health Maintenance     Influenza        Initial /73 (BP Location: Left arm, Patient Position: Chair, Cuff Size: Adult Large)  Pulse 97  Temp 99  F (37.2  C) (Oral)  Ht 5' 5.95\" (1.675 m)  Wt 200 lb 9.6 oz (91 kg)  LMP 11/28/2017 (Approximate)  BMI 32.43 kg/m2 Estimated body mass index is 32.43 kg/(m^2) as calculated from the following:    Height as of this encounter: 5' 5.95\" (1.675 m).    Weight as of this encounter: 200 lb 9.6 oz (91 kg).  Medication Reconciliation: rayshawn Doss MA      "

## 2018-02-09 ENCOUNTER — PRENATAL OFFICE VISIT (OUTPATIENT)
Dept: OBGYN | Facility: CLINIC | Age: 28
End: 2018-02-09
Payer: COMMERCIAL

## 2018-02-09 VITALS
DIASTOLIC BLOOD PRESSURE: 76 MMHG | WEIGHT: 198.5 LBS | SYSTOLIC BLOOD PRESSURE: 133 MMHG | BODY MASS INDEX: 32.09 KG/M2 | HEART RATE: 83 BPM

## 2018-02-09 DIAGNOSIS — O34.219 HISTORY OF CESAREAN DELIVERY, CURRENTLY PREGNANT: Primary | ICD-10-CM

## 2018-02-09 PROCEDURE — 99207 ZZC COMPLICATED OB VISIT: CPT | Performed by: OBSTETRICS & GYNECOLOGY

## 2018-02-09 NOTE — NURSING NOTE
"Chief Complaint   Patient presents with     Prenatal Care       Initial /76 (BP Location: Right arm, Patient Position: Chair, Cuff Size: Adult Regular)  Pulse 83  Wt 198 lb 8 oz (90 kg)  LMP 11/28/2017 (Approximate)  BMI 32.09 kg/m2 Estimated body mass index is 32.09 kg/(m^2) as calculated from the following:    Height as of 1/17/18: 5' 5.95\" (1.675 m).    Weight as of this encounter: 198 lb 8 oz (90 kg).  Medication Reconciliation: complete     Hannah Garrett, [facility]  February 9, 2018      "

## 2018-02-09 NOTE — MR AVS SNAPSHOT
After Visit Summary   2018    Peewee Wilson    MRN: [id number]           Patient Information     Date Of Birth          1990        Visit Information        Provider Department      2018 10:45 AM Shivani Pa MD [facility]        Today's Diagnoses     History of  delivery, currently pregnant    -  1       Follow-ups after your visit        Follow-up notes from your care team     Return in about 4 weeks (around 3/9/2018) for OB Visit.      Who to contact     If you have questions or need follow up information about today's clinic visit or your schedule please contact [facility] directly at [phone].  Normal or non-critical lab and imaging results will be communicated to you by MyChart, letter or phone within 4 business days after the clinic has received the results. If you do not hear from us within 7 days, please contact the clinic through [employer] or phone. If you have a critical or abnormal lab result, we will notify you by phone as soon as possible.  Submit refill requests through [employer] or call your pharmacy and they will forward the refill request to us. Please allow 3 business days for your refill to be completed.          Additional Information About Your Visit        MyChart Information     [employer] gives you secure access to your electronic health record. If you see a primary care provider, you can also send messages to your care team and make appointments. If you have questions, please call your primary care clinic.  If you do not have a primary care provider, please call [phone] and they will assist you.        Care EveryWhere ID     This is your Care EveryWhere ID. This could be used by other organizations to access your [place] medical records  [id number]        Your Vitals Were     Pulse Last Period BMI (Body Mass Index)             83 2017 (Approximate) 32.09 kg/m2          Blood Pressure from Last 3  Encounters:   02/09/18 133/76   01/17/18 125/73   01/12/18 121/74    Weight from Last 3 Encounters:   02/09/18 198 lb 8 oz (90 kg)   01/17/18 200 lb 9.6 oz (91 kg)   01/12/18 199 lb 1.6 oz (90.3 kg)              Today, you had the following     No orders found for display       Primary Care Provider Office Phone # Fax #    [facility] [phone] [address]  [facility] [address]        Equal Access to Services     [facility]: Hadii aad ku hadasho Soomaali, waaxda luqadaha, qaybta kaalmada adeestebanyasuhail, donovan landaverde . So [facility] [phone].    ATENCIÓN: Si habla español, tiene a james disposición servicios gratuitos de asistencia lingüística. [facility] [phone].    We comply with applicable federal civil rights laws and [place] laws. We do not discriminate on the basis of race, color, national origin, age, disability, sex, sexual orientation, or gender identity.            Thank you!     Thank you for choosing [facility]  for your care. Our goal is always to provide you with excellent care. Hearing back from our patients is one way we can continue to improve our services. Please take a few minutes to complete the written survey that you may receive in the mail after your visit with us. Thank you!             Your Updated Medication List - Protect others around you: Learn how to safely use, store and throw away your medicines at www.disposemymeds.org.          This list is accurate as of 2/9/18 11:00 AM.  Always use your most recent med list.                   Brand Name Dispense Instructions for use Diagnosis    * PRENATAL 19 Chew           * prenatal multivitamin plus iron 27-0.8 MG Tabs per tablet     100 tablet    Take 1 tablet by mouth daily    Pregnancy confirmed by positive urine test       * Notice:  This list has 2 medication(s) that are the same as other medications prescribed for you. Read the directions  carefully, and ask your doctor or other care provider to review them with you.

## 2018-02-09 NOTE — PROGRESS NOTES
Presents for routine  appointment.     No complaints.  Patient was seen at [place] since I saw her last.  She is on antibiotics for UTI.  These were prescribed two weeks ago and she is still working on it.    Care Everywhere reviewed - UA appears contaminated and Culture was negative.        No LOF/VB/Ctxs.    ROS:   and GI  negative.     Please see Prenatal Vitals and Notes Flowsheet for objective data.    A/P:  28 year old  at 10w3d       ICD-10-CM    1. History of  delivery, currently pregnant O34.219        Body mass index is 32.09 kg/(m^2).   Genetic screening quad screen  She can stop the abx bc urine culture neg.    Discussed appropriate weight gain, diet, and exercise   Follow up in 4  weeks.      Shivani Pa MD

## 2018-10-17 ENCOUNTER — ALLIED HEALTH/NURSE VISIT (OUTPATIENT)
Dept: PEDIATRICS | Facility: CLINIC | Age: 28
End: 2018-10-17
Payer: COMMERCIAL

## 2018-10-17 DIAGNOSIS — Z23 NEED FOR PROPHYLACTIC VACCINATION AND INOCULATION AGAINST INFLUENZA: Primary | ICD-10-CM

## 2018-10-17 PROCEDURE — 99207 ZZC NO CHARGE NURSE ONLY: CPT

## 2018-10-17 PROCEDURE — 90471 IMMUNIZATION ADMIN: CPT

## 2018-10-17 PROCEDURE — 90686 IIV4 VACC NO PRSV 0.5 ML IM: CPT

## 2018-10-17 NOTE — PROGRESS NOTES

## 2018-10-17 NOTE — MR AVS SNAPSHOT
After Visit Summary   10/17/2018    Peewee Wilson    MRN: [id number]           Patient Information     Date Of Birth          1990        Visit Information        Provider Department      10/17/2018 3:00 PM [facility]        Today's Diagnoses     Need for prophylactic vaccination and inoculation against influenza    -  1       Follow-ups after your visit        Who to contact     If you have questions or need follow up information about today's clinic visit or your schedule please contact [facility] directly at [phone].  Normal or non-critical lab and imaging results will be communicated to you by [employer], letter or phone within 4 business days after the clinic has received the results. If you do not hear from us within 7 days, please contact the clinic through [employer] or phone. If you have a critical or abnormal lab result, we will notify you by phone as soon as possible.  Submit refill requests through [employer] or call your pharmacy and they will forward the refill request to us. Please allow 3 business days for your refill to be completed.          Additional Information About Your Visit        MyChart Information     [employer] gives you secure access to your electronic health record. If you see a primary care provider, you can also send messages to your care team and make appointments. If you have questions, please call your primary care clinic.  If you do not have a primary care provider, please call [phone] and they will assist you.      [employer] is an electronic gateway that provides easy, online access to your medical records. With [employer] you can request a clinic appointment, read your test results, renew a prescription or communicate with your care team.     To access your existing account, please contact your [facility] or call [phone] for assistance.        Care EveryWhere ID     This is your  Care EveryWhere ID. This could be used by other organizations to access your [place] medical records  [id number]        Your Vitals Were     Last Period                   11/28/2017 (Approximate)            Blood Pressure from Last 3 Encounters:   02/09/18 133/76   01/17/18 125/73   01/12/18 121/74    Weight from Last 3 Encounters:   02/09/18 198 lb 8 oz (90 kg)   01/17/18 200 lb 9.6 oz (91 kg)   01/12/18 199 lb 1.6 oz (90.3 kg)              We Performed the Following     FLU VACCINE, SPLIT VIRUS, IM (QUADRIVALENT) [78173]- >3 YRS     Vaccine Administration, Initial [84981]        Primary Care Provider Office Phone # Fax #    [facility] [phone] [address]  [facility] [address]        Equal Access to Services     [employer]APA : Hadii susan brooke hadasho Somelissaali, waaxda luqadaha, qaybta kaalmada adeegyada, donovan mckinney hayvelia landaverde . So [facility] [phone].    ATENCIÓN: Si habla español, tiene a james disposición servicios gratuitos de asistencia lingüística. Llame al [phone].    We comply with applicable federal civil rights laws and [place] laws. We do not discriminate on the basis of race, color, national origin, age, disability, sex, sexual orientation, or gender identity.            Thank you!     Thank you for choosing [facility]  for your care. Our goal is always to provide you with excellent care. Hearing back from our patients is one way we can continue to improve our services. Please take a few minutes to complete the written survey that you may receive in the mail after your visit with us. Thank you!             Your Updated Medication List - Protect others around you: Learn how to safely use, store and throw away your medicines at www.disposemymeds.org.          This list is accurate as of 10/17/18  3:44 PM.  Always use your most recent med list.                   Brand Name Dispense Instructions for use Diagnosis    *  PRENATAL 19 Chew           * prenatal multivitamin plus iron 27-0.8 MG Tabs per tablet     100 tablet    Take 1 tablet by mouth daily    Pregnancy confirmed by positive urine test       * Notice:  This list has 2 medication(s) that are the same as other medications prescribed for you. Read the directions carefully, and ask your doctor or other care provider to review them with you.

## 2018-10-17 NOTE — NURSING NOTE
Peewee Wilson comes into clinic today at the request of Dr. Yvonne March Ordering Provider for flu shot.      This service provided today was under the supervising provider of the day Dr. Fox, who was available if needed.    Jose Armando Howard

## 2019-02-21 ENCOUNTER — OFFICE VISIT (OUTPATIENT)
Dept: OBGYN | Facility: CLINIC | Age: 29
End: 2019-02-21
Payer: COMMERCIAL

## 2019-02-21 VITALS
BODY MASS INDEX: 30.05 KG/M2 | OXYGEN SATURATION: 98 % | WEIGHT: 185.9 LBS | HEART RATE: 86 BPM | SYSTOLIC BLOOD PRESSURE: 107 MMHG | DIASTOLIC BLOOD PRESSURE: 65 MMHG

## 2019-02-21 DIAGNOSIS — N92.0 MENORRHAGIA WITH REGULAR CYCLE: Primary | ICD-10-CM

## 2019-02-21 PROCEDURE — 96372 THER/PROPH/DIAG INJ SC/IM: CPT | Performed by: OBSTETRICS & GYNECOLOGY

## 2019-02-21 PROCEDURE — 99213 OFFICE O/P EST LOW 20 MIN: CPT | Mod: 25 | Performed by: OBSTETRICS & GYNECOLOGY

## 2019-02-21 RX ORDER — MEDROXYPROGESTERONE ACETATE 150 MG/ML
150 INJECTION, SUSPENSION INTRAMUSCULAR
Status: DISCONTINUED | OUTPATIENT
Start: 2019-02-21 | End: 2019-11-04

## 2019-02-21 RX ORDER — ALBUTEROL SULFATE 90 UG/1
1-2 AEROSOL, METERED RESPIRATORY (INHALATION)
COMMUNITY
Start: 2016-02-26 | End: 2019-12-12

## 2019-02-21 RX ORDER — MEDROXYPROGESTERONE ACETATE 150 MG/ML
150 INJECTION, SUSPENSION INTRAMUSCULAR
COMMUNITY
Start: 2018-09-07 | End: 2019-11-04

## 2019-02-21 RX ADMIN — MEDROXYPROGESTERONE ACETATE 150 MG: 150 INJECTION, SUSPENSION INTRAMUSCULAR at 12:10

## 2019-02-21 NOTE — NURSING NOTE
Prior to injection, verified patient identity using patient's name and date of birth.  Due to injection administration, patient instructed to remain in clinic for 15 minutes  afterwards, and to report any adverse reaction to me immediately.    BP: 107/65    LAST PAP/EXAM:   Lab Results   Component Value Date    PAP NIL 10/31/2016     URINE HCG:not indicated    NEXT INJECTION DUE: 5/9/19 - 5/23/19       Drug Amount Wasted:  None.  Vial/Syringe: Single dose vial  Expiration Date:  02/20  Shivani Valentin CMA  February 21, 2019 12:13 PM

## 2019-02-21 NOTE — PATIENT INSTRUCTIONS
If you have any questions regarding your visit, Please contact your care team.    [facility]: [phone]      [facility] Health CLINIC HOURS TELEPHONE NUMBER   Diana Silvestre DO.    [facility] -    KATHY Sheets       Monday, Wednesday, Thursday and Friday, [place]  8:30a.m-5:00 p.m   [facility]  [address] [place] [address]  [phone] ask for [facility]    Imaging [phone]       Urgent Care locations:    [facility] Saturday and Sunday   9 am - 5 pm    Monday-Friday   12 pm - 8 pm  Saturday and Sunday   9 am - 5 pm   [phone]     [facility] Labor and Delivery:  [phone]    If you need a medication refill, please contact your pharmacy. Please allow 3 business days for your refill to be completed.  As always, Thank you for trusting us with your healthcare needs!

## 2019-02-21 NOTE — PROGRESS NOTES
This 30 y/o female, , using tubal ligation with last C/S for contraception, s/p last Depo Provera injection last 2018, presents today for her next injection and uses this for treatment of her hx of menorrhagia.  She denies any problems with this medication but transferred care from [facility] so go behind on her next injection.  She understands to return in 2019 for her next annual exam with pap smear.  /65   Pulse 86   Wt 84.3 kg (185 lb 14.4 oz)   SpO2 98%   Breastfeeding? No   BMI 30.05 kg/m    ROS:  10 systems were reviewed and the positives were listed under problems.  A PE was not performed.  Assessment - Renewal of Depo Provera medication for treatment of a hx of menorrhagia when off the medication  Plan - She is s/p tubal ligation and so a UPT was not checked today.  She will return in 85-90 days for her next Depo Provera injection and in 2019 for her next annual exam, or earlier prn.  This was a 20-minute visit and over 50% of the time was spent in direct patient consultation.      Prior to injection, verified patient identity using patient's name and date of birth.  Due to injection administration, patient instructed to remain in clinic for 15 minutes  afterwards, and to report any adverse reaction to me immediately.    BP: 107/65    LAST PAP/EXAM:   Lab Results   Component Value Date    PAP NIL 10/31/2016     URINE HCG:not indicated    NEXT INJECTION DUE: 19 - 19       Drug Amount Wasted:  None.  Vial/Syringe: Single dose vial  Expiration Date:    Shivani Valentin [facility]  2019 12:13 PM

## 2019-10-29 ENCOUNTER — TELEPHONE (OUTPATIENT)
Dept: FAMILY MEDICINE | Facility: CLINIC | Age: 29
End: 2019-10-29

## 2019-10-29 NOTE — TELEPHONE ENCOUNTER
[facility] Becky Gilbert PA-C on 11/04 for the following:    I ve been having complications with some breathing and having chest pains. I do have asthma and have not had an inhaler and quite sometime    Routing to review symptoms prior to appointment.

## 2019-10-29 NOTE — TELEPHONE ENCOUNTER
Attempt # 1  Called patient at home [phone] not a working number. Tried [phone]  Was call answered?  Yes, irregular heart beating lately, makes dizzy and lightheaded, middle of test, heart beating real fast, burst of hotness and sweating, sometimes a migraine headache with this. In last week has happened about two times every day.     DENIES: vomiting, diarrhea, constipation.      Nurse advised patient to go to ER to be assessed for heart issues. Keep appointment on 11/4 with PCP for hospital f/u. Patient verbalized understanding and agreement with plan and will go to ER.       Yin Vazquez RN  [facility]

## 2019-11-04 ENCOUNTER — OFFICE VISIT (OUTPATIENT)
Dept: FAMILY MEDICINE | Facility: CLINIC | Age: 29
End: 2019-11-04
Payer: COMMERCIAL

## 2019-11-04 VITALS
TEMPERATURE: 98.4 F | WEIGHT: 190 LBS | DIASTOLIC BLOOD PRESSURE: 70 MMHG | OXYGEN SATURATION: 99 % | HEIGHT: 66 IN | BODY MASS INDEX: 30.53 KG/M2 | HEART RATE: 84 BPM | SYSTOLIC BLOOD PRESSURE: 105 MMHG

## 2019-11-04 DIAGNOSIS — R07.9 CHEST PAIN, UNSPECIFIED TYPE: Primary | ICD-10-CM

## 2019-11-04 DIAGNOSIS — J45.990 EXERCISE-INDUCED ASTHMA: ICD-10-CM

## 2019-11-04 LAB
ERYTHROCYTE [DISTWIDTH] IN BLOOD BY AUTOMATED COUNT: 12.6 % (ref 10–15)
HCT VFR BLD AUTO: 37.8 % (ref 35–47)
HGB BLD-MCNC: 12.2 G/DL (ref 11.7–15.7)
MCH RBC QN AUTO: 29 PG (ref 26.5–33)
MCHC RBC AUTO-ENTMCNC: 32.3 G/DL (ref 31.5–36.5)
MCV RBC AUTO: 90 FL (ref 78–100)
PLATELET # BLD AUTO: 273 10E9/L (ref 150–450)
RBC # BLD AUTO: 4.21 10E12/L (ref 3.8–5.2)
TSH SERPL DL<=0.005 MIU/L-ACNC: 1.05 MU/L (ref 0.4–4)
WBC # BLD AUTO: 6.8 10E9/L (ref 4–11)

## 2019-11-04 PROCEDURE — 85027 COMPLETE CBC AUTOMATED: CPT | Performed by: PHYSICIAN ASSISTANT

## 2019-11-04 PROCEDURE — 36415 COLL VENOUS BLD VENIPUNCTURE: CPT | Performed by: PHYSICIAN ASSISTANT

## 2019-11-04 PROCEDURE — 99214 OFFICE O/P EST MOD 30 MIN: CPT | Performed by: PHYSICIAN ASSISTANT

## 2019-11-04 PROCEDURE — 84443 ASSAY THYROID STIM HORMONE: CPT | Performed by: PHYSICIAN ASSISTANT

## 2019-11-04 RX ORDER — ALBUTEROL SULFATE 90 UG/1
2 AEROSOL, METERED RESPIRATORY (INHALATION) EVERY 4 HOURS PRN
Qty: 18 G | Refills: 0 | Status: SHIPPED | OUTPATIENT
Start: 2019-11-04 | End: 2020-10-12

## 2019-11-04 ASSESSMENT — ENCOUNTER SYMPTOMS
RHINORRHEA: 0
COUGH: 1
NERVOUS/ANXIOUS: 1
MYALGIAS: 0
SHORTNESS OF BREATH: 1
CHEST TIGHTNESS: 1

## 2019-11-04 ASSESSMENT — MIFFLIN-ST. JEOR: SCORE: 1585.64

## 2019-11-04 NOTE — PROGRESS NOTES
Subjective     Peewee Wilson is a 29 year old female who presents to clinic today for the following health issues:    HPI   CHEST PAIN     Onset: on and off x 3 weeks     Description:   Location:  Middle   Character: sharp and tight  Radiation: none   Duration: 2-3  minutes     Intensity: 8/10    Progression of Symptoms:  same    Accompanying Signs & Symptoms:  Shortness of breath: YES  Sweating: YES- sometimes  Nausea/vomiting: no   Lightheadedness: YES  Palpitations: yes  Fever/Chills: no   Cough: YES  Heartburn: no     History:   Family history of heart disease no   Tobacco use: no     Precipitating factors:   Worse with exertion: YES  Worse with deep breaths :  no   Related to food: no     Alleviating factors:  None        Therapies Tried and outcome: none   Has had in the past for several years.    No known triggers.    Did faint once from the shortness of breath.  Gets lightheaded when the pain comes on and her heart races.  She thinks that is what causes the shortness of breath.    Does wonder if it is anxiety-can happened even when not anxious.    Has used her sons nebulizer and it helps.    Sometimes gets shortness of breath with lying flat.  No hx of clots or recent travel.  No pain in legs  Hx of asthma   No other cold symptoms.          Patient Active Problem List   Diagnosis     History of  delivery, currently pregnant     Past Surgical History:   Procedure Laterality Date     ABDOMEN SURGERY      2 c-sections     GYN SURGERY      dermoid cyst       Social History     Tobacco Use     Smoking status: Former Smoker     Smokeless tobacco: Never Used   Substance Use Topics     Alcohol use: No     History reviewed. No pertinent family history.          Reviewed and updated as needed this visit by Provider  Allergies  Meds         Review of Systems   HENT: Negative for rhinorrhea.    Respiratory: Positive for cough, chest tightness and shortness of breath.    Cardiovascular: Positive for chest pain.  "Negative for peripheral edema.   Musculoskeletal: Negative for myalgias.   Psychiatric/Behavioral: The patient is nervous/anxious.             Objective    /70   Pulse 84   Temp 98.4  F (36.9  C)   Ht 1.664 m (5' 5.5\")   Wt 86.2 kg (190 lb)   LMP 10/14/2019   SpO2 99%   BMI 31.14 kg/m    Body mass index is 31.14 kg/m .  Physical Exam  Constitutional:       General: She is not in acute distress.  HENT:      Right Ear: Tympanic membrane, ear canal and external ear normal.      Left Ear: Tympanic membrane, ear canal and external ear normal.   Cardiovascular:      Rate and Rhythm: Normal rate and regular rhythm.   Pulmonary:      Effort: Pulmonary effort is normal.      Breath sounds: Normal breath sounds.   Musculoskeletal:         General: No tenderness.      Right lower leg: No edema.      Left lower leg: No edema.   Neurological:      Mental Status: She is alert.   Psychiatric:         Mood and Affect: Mood normal.         Thought Content: Thought content normal.            Diagnostic Test Results:  Labs reviewed in Epic        Assessment & Plan     1. Chest pain, unspecified type  Likely due to asthma, risk for PE low.  Will follow up when labs are back and next week.  If worsening pt is to contact us right away   - CBC with platelets  - TSH with free T4 reflex  - albuterol (PROAIR HFA/PROVENTIL HFA/VENTOLIN HFA) 108 (90 Base) MCG/ACT inhaler; Inhale 2 puffs into the lungs every 4 hours as needed for shortness of breath / dyspnea or wheezing  Dispense: 18 g; Refill: 0    2. Exercise-induced asthma  Refilled   - albuterol (PROAIR HFA/PROVENTIL HFA/VENTOLIN HFA) 108 (90 Base) MCG/ACT inhaler; Inhale 2 puffs into the lungs every 4 hours as needed for shortness of breath / dyspnea or wheezing  Dispense: 18 g; Refill: 0     BMI:   Estimated body mass index is 31.14 kg/m  as calculated from the following:    Height as of this encounter: 1.664 m (5' 5.5\").    Weight as of this encounter: 86.2 kg (190 lb). "               Return in about 1 week (around 11/11/2019) for breathing and chest pain .    Becky Gilbert PA-C  [facility]

## 2019-11-05 ASSESSMENT — ASTHMA QUESTIONNAIRES: ACT_TOTALSCORE: 16

## 2019-12-12 ENCOUNTER — OFFICE VISIT (OUTPATIENT)
Dept: MIDWIFE SERVICES | Facility: CLINIC | Age: 29
End: 2019-12-12
Payer: COMMERCIAL

## 2019-12-12 VITALS
WEIGHT: 193 LBS | DIASTOLIC BLOOD PRESSURE: 77 MMHG | TEMPERATURE: 98.6 F | BODY MASS INDEX: 31.63 KG/M2 | HEART RATE: 84 BPM | SYSTOLIC BLOOD PRESSURE: 122 MMHG

## 2019-12-12 DIAGNOSIS — Z12.4 SCREENING FOR CERVICAL CANCER: ICD-10-CM

## 2019-12-12 DIAGNOSIS — R30.0 DYSURIA: ICD-10-CM

## 2019-12-12 DIAGNOSIS — Z00.00 ANNUAL PHYSICAL EXAM: Primary | ICD-10-CM

## 2019-12-12 DIAGNOSIS — Z11.3 SCREEN FOR STD (SEXUALLY TRANSMITTED DISEASE): ICD-10-CM

## 2019-12-12 DIAGNOSIS — K64.4 EXTERNAL HEMORRHOIDS: ICD-10-CM

## 2019-12-12 LAB
ALBUMIN UR-MCNC: NEGATIVE MG/DL
APPEARANCE UR: CLEAR
BILIRUB UR QL STRIP: NEGATIVE
COLOR UR AUTO: YELLOW
GLUCOSE UR STRIP-MCNC: NEGATIVE MG/DL
HGB UR QL STRIP: NEGATIVE
KETONES UR STRIP-MCNC: NEGATIVE MG/DL
LEUKOCYTE ESTERASE UR QL STRIP: NEGATIVE
NITRATE UR QL: NEGATIVE
PH UR STRIP: 7.5 PH (ref 5–7)
SOURCE: ABNORMAL
SP GR UR STRIP: 1.02 (ref 1–1.03)
SPECIMEN SOURCE: ABNORMAL
UROBILINOGEN UR STRIP-ACNC: 2 EU/DL (ref 0.2–1)
WET PREP SPEC: ABNORMAL

## 2019-12-12 PROCEDURE — 87591 N.GONORRHOEAE DNA AMP PROB: CPT | Performed by: ADVANCED PRACTICE MIDWIFE

## 2019-12-12 PROCEDURE — 81003 URINALYSIS AUTO W/O SCOPE: CPT | Performed by: ADVANCED PRACTICE MIDWIFE

## 2019-12-12 PROCEDURE — 86803 HEPATITIS C AB TEST: CPT | Performed by: ADVANCED PRACTICE MIDWIFE

## 2019-12-12 PROCEDURE — 87624 HPV HI-RISK TYP POOLED RSLT: CPT | Performed by: ADVANCED PRACTICE MIDWIFE

## 2019-12-12 PROCEDURE — G0499 HEPB SCREEN HIGH RISK INDIV: HCPCS | Performed by: ADVANCED PRACTICE MIDWIFE

## 2019-12-12 PROCEDURE — 87086 URINE CULTURE/COLONY COUNT: CPT | Performed by: ADVANCED PRACTICE MIDWIFE

## 2019-12-12 PROCEDURE — G0145 SCR C/V CYTO,THINLAYER,RESCR: HCPCS | Performed by: ADVANCED PRACTICE MIDWIFE

## 2019-12-12 PROCEDURE — 87088 URINE BACTERIA CULTURE: CPT | Performed by: ADVANCED PRACTICE MIDWIFE

## 2019-12-12 PROCEDURE — 87491 CHLMYD TRACH DNA AMP PROBE: CPT | Performed by: ADVANCED PRACTICE MIDWIFE

## 2019-12-12 PROCEDURE — 87389 HIV-1 AG W/HIV-1&-2 AB AG IA: CPT | Performed by: ADVANCED PRACTICE MIDWIFE

## 2019-12-12 PROCEDURE — 99395 PREV VISIT EST AGE 18-39: CPT | Performed by: ADVANCED PRACTICE MIDWIFE

## 2019-12-12 PROCEDURE — 86780 TREPONEMA PALLIDUM: CPT | Performed by: ADVANCED PRACTICE MIDWIFE

## 2019-12-12 PROCEDURE — 36415 COLL VENOUS BLD VENIPUNCTURE: CPT | Performed by: ADVANCED PRACTICE MIDWIFE

## 2019-12-12 PROCEDURE — 87210 SMEAR WET MOUNT SALINE/INK: CPT | Performed by: ADVANCED PRACTICE MIDWIFE

## 2019-12-12 PROCEDURE — G0476 HPV COMBO ASSAY CA SCREEN: HCPCS | Performed by: ADVANCED PRACTICE MIDWIFE

## 2019-12-12 NOTE — PROGRESS NOTES
Peewee is a 29 year old  female who presents for annual exam.     Menses are regular q 28-30 days and variable lasting 6 days.  Menses flow: normal.  Patient's last menstrual period was 2019.. Using none for contraception.  She is not currently considering pregnancy.  Besides routine health maintenance, she has no other health concerns today .  GYNECOLOGIC HISTORY:  Menarche:   Age at first intercourse: 15 Number of lifetime partners: less than 6  Peewee is sexually active with 1male partner(s) and is currently in monogamous relationship with boyfriend.    History sexually transmitted infections:No STD history  STI testing offered?  Accepted  NIRU exposure: No  History of abnormal Pap smear: YES - updated in Problem List and Health Maintenance accordingly  Family history of breast CA: No  Family history of uterine/ovarian CA: No    Family history of colon CA: No    HEALTH MAINTENANCE:  Cholesterol: (No results found for: CHOL History of abnormal lipids: No  Mammo: 0 . History of abnormal Mammo: YES, No, Not applicable.  Regular Self Breast Exams: Yes  Calcium/Vitamin D intake: source:  none Adequate? Yes  TSH:  TSH   Date Value Ref Range Status   2019 1.05 0.40 - 4.00 mU/L Final     Pap  Lab Results   Component Value Date    PAP NIL 10/31/2016       HISTORY:  OB History    Para Term  AB Living   4 3 3 0 1 3   SAB TAB Ectopic Multiple Live Births   0 0 0 0 3      # Outcome Date GA Lbr Shar/2nd Weight Sex Delivery Anes PTL Lv   4 Term 18 37w3d   M -SEC   RAMEZ   3 Term 11 39w1d  3.685 kg (8 lb 2 oz) F CS-LTranv Spinal  RAMEZ      Name: Sanyla      Apgar1: 7  Apgar5: 8   2 Term 06/04/10 41w3d  3.827 kg (8 lb 7 oz) M CS-LTranv Spinal, EPI  RAMEZ      Name: Levmaximo      Apgar1: 2  Apgar5: 8   1 AB              Past Medical History:   Diagnosis Date     Asthma      Migraines      Past Surgical History:   Procedure Laterality Date     ABDOMEN SURGERY      2 c-sections     GYN  SURGERY      dermoid cyst     History reviewed. No pertinent family history.  Social History     Socioeconomic History     Marital status: Single     Spouse name: None     Number of children: None     Years of education: None     Highest education level: None   Occupational History     None   Social Needs     Financial resource strain: None     Food insecurity:     Worry: None     Inability: None     Transportation needs:     Medical: None     Non-medical: None   Tobacco Use     Smoking status: Former Smoker     Smokeless tobacco: Never Used   Substance and Sexual Activity     Alcohol use: No     Drug use: No     Sexual activity: Yes     Partners: Male     Birth control/protection: Female Surgical     Comment: tubal ligation   Lifestyle     Physical activity:     Days per week: None     Minutes per session: None     Stress: None   Relationships     Social connections:     Talks on phone: None     Gets together: None     Attends Rastafarian service: None     Active member of club or organization: None     Attends meetings of clubs or organizations: None     Relationship status: None     Intimate partner violence:     Fear of current or ex partner: None     Emotionally abused: None     Physically abused: None     Forced sexual activity: None   Other Topics Concern     Parent/sibling w/ CABG, MI or angioplasty before 65F 55M? No   Social History Narrative     None       Current Outpatient Medications:      albuterol (PROAIR HFA/PROVENTIL HFA/VENTOLIN HFA) 108 (90 Base) MCG/ACT inhaler, Inhale 2 puffs into the lungs every 4 hours as needed for shortness of breath / dyspnea or wheezing, Disp: 18 g, Rfl: 0   No Known Allergies    Past medical, surgical, social and family history were reviewed and updated in EPIC.    ROS:   C:     NEGATIVE for fever, chills, change in weight  I:       NEGATIVE for worrisome rashes, moles or lesions  E:     NEGATIVE for vision changes or irritation  E/M: NEGATIVE for ear, mouth and throat  problems  R:     NEGATIVE for significant cough or SOB  CV:   NEGATIVE for chest pain, palpitations or peripheral edema  GI:     NEGATIVE for nausea, abdominal pain, heartburn, or change in bowel habits  :   NEGATIVE for frequency, dysuria, hematuria, vaginal discharge, or irregular bleeding  M:     NEGATIVE for significant arthralgias or myalgia  N:      NEGATIVE for weakness, dizziness or paresthesias  E:      NEGATIVE for temperature intolerance, skin/hair changes  P:      NEGATIVE for changes in mood or affect.    EXAM:  /77 (BP Location: Left arm, Patient Position: Sitting, Cuff Size: Adult Regular)   Pulse 84   Temp 98.6  F (37  C) (Oral)   Wt 87.5 kg (193 lb)   LMP 12/02/2019   BMI 31.63 kg/m     BMI: Body mass index is 31.63 kg/m .  Constitutional: healthy, alert and no distress  Head: Normocephalic. No masses, lesions, tenderness or abnormalities  Neck: Neck supple. Trachea midline. No adenopathy. Thyroid symmetric, normal size.   Cardiovascular: RRR.   Respiratory: Negative.   Breast: Deferred  Gastrointestinal: Abdomen soft, non-tender, non-distended. No masses, organomegaly.  :  Vulva:  No external lesions, normal female hair distribution, no inguinal adenopathy.    Urethra:  Midline, non-tender, well supported, no discharge  Vagina:  Moist, pink, no abnormal discharge, no lesions  Cervix: Smooth, pink, no visible lesions  Rectal Exam: deferred  Musculoskeletal: extremities normal  Skin: no suspicious lesions or rashes  Psychiatric: Affect appropriate, cooperative,mentation appears normal.     COUNSELING:        Regular exercise       Healthy diet/nutrition       Contraception   reports that she has quit smoking. She has never used smokeless tobacco.    Body mass index is 31.63 kg/m .    ASSESSMENT/PLAN:  29 year old female with satisfactory annual exam  (Z00.00) Annual physical exam  (Z11.3) Screen for STD (sexually transmitted disease)  (primary encounter diagnosis)  Comment:   Plan: Wet  prep, HIV Antigen Antibody Combo, Hepatitis        B surface antigen, Hepatitis C antibody,         Chlamydia trachomatis PCR, Neisseria         gonorrhoeae PCR, Treponema Abs w Reflex to RPR         and Titer          (Z12.4) Screening for cervical cancer  Comment:   Plan: Pap imaged thin layer screen with HPV -         recommended age 30 - 65 years (select HPV order        below), HPV High Risk Types DNA Cervical          (R30.0) Dysuria  Comment:   Plan: Urine Culture Aerobic Bacterial, UA without         Microscopic    (K64.4) External hemorrhoids  Plan: Referral to Gastro    Will notify patient of lab results once received  RTC yearly for annual exam or sooner prn  Kiara Purcell CNM

## 2019-12-12 NOTE — NURSING NOTE
"Chief Complaint   Patient presents with     Physical       Initial /77 (BP Location: Left arm, Patient Position: Sitting, Cuff Size: Adult Regular)   Pulse 84   Temp 98.6  F (37  C) (Oral)   Wt 87.5 kg (193 lb)   LMP 2019   BMI 31.63 kg/m   Estimated body mass index is 31.63 kg/m  as calculated from the following:    Height as of 19: 1.664 m (5' 5.5\").    Weight as of this encounter: 87.5 kg (193 lb).  BP completed using cuff size: regular    Questioned patient about current smoking habits.  Pt. quit smoking some time ago.          The following HM Due: pap smear      The following patient reported/Care Every where data was sent to:  P ABSTRACT QUALITY INITIATIVES [[id number]]  dave Treviño MA            "

## 2019-12-13 ENCOUNTER — MYC MEDICAL ADVICE (OUTPATIENT)
Dept: MIDWIFE SERVICES | Facility: CLINIC | Age: 29
End: 2019-12-13

## 2019-12-13 LAB
BACTERIA SPEC CULT: ABNORMAL
BACTERIA SPEC CULT: ABNORMAL
C TRACH DNA SPEC QL NAA+PROBE: NEGATIVE
HBV SURFACE AG SERPL QL IA: NONREACTIVE
HCV AB SERPL QL IA: NONREACTIVE
HIV 1+2 AB+HIV1 P24 AG SERPL QL IA: NONREACTIVE
N GONORRHOEA DNA SPEC QL NAA+PROBE: NEGATIVE
SPECIMEN SOURCE: ABNORMAL
SPECIMEN SOURCE: NORMAL
SPECIMEN SOURCE: NORMAL
T PALLIDUM AB SER QL: NONREACTIVE

## 2019-12-16 LAB
COPATH REPORT: NORMAL
PAP: NORMAL

## 2019-12-18 LAB
FINAL DIAGNOSIS: NORMAL
HPV HR 12 DNA CVX QL NAA+PROBE: NEGATIVE
HPV16 DNA SPEC QL NAA+PROBE: NEGATIVE
HPV18 DNA SPEC QL NAA+PROBE: NEGATIVE
SPECIMEN DESCRIPTION: NORMAL
SPECIMEN SOURCE CVX/VAG CYTO: NORMAL

## 2020-02-26 ENCOUNTER — TELEPHONE (OUTPATIENT)
Dept: FAMILY MEDICINE | Facility: CLINIC | Age: 30
End: 2020-02-26

## 2020-02-26 NOTE — TELEPHONE ENCOUNTER
[facility] by Becky Gilbert PA-C 02/27-02/28 for the following:    Having breast pain and drainage from my right breast and some bleeding from it as well     TC scheduled only available appointment at 2PM on 02/28. Routing to review symptoms prior to appointment.

## 2020-02-28 ENCOUNTER — OFFICE VISIT (OUTPATIENT)
Dept: FAMILY MEDICINE | Facility: CLINIC | Age: 30
End: 2020-02-28
Payer: COMMERCIAL

## 2020-02-28 VITALS
TEMPERATURE: 98.2 F | BODY MASS INDEX: 31.18 KG/M2 | DIASTOLIC BLOOD PRESSURE: 68 MMHG | SYSTOLIC BLOOD PRESSURE: 106 MMHG | HEART RATE: 73 BPM | WEIGHT: 194 LBS | HEIGHT: 66 IN

## 2020-02-28 DIAGNOSIS — N64.52 BREAST DISCHARGE: Primary | ICD-10-CM

## 2020-02-28 DIAGNOSIS — J45.990 EXERCISE-INDUCED ASTHMA: ICD-10-CM

## 2020-02-28 PROBLEM — O34.219 HISTORY OF CESAREAN DELIVERY, CURRENTLY PREGNANT: Status: RESOLVED | Noted: 2018-01-12 | Resolved: 2020-02-28

## 2020-02-28 PROCEDURE — 99213 OFFICE O/P EST LOW 20 MIN: CPT | Performed by: PHYSICIAN ASSISTANT

## 2020-02-28 RX ORDER — FLUTICASONE PROPIONATE 110 UG/1
1 AEROSOL, METERED RESPIRATORY (INHALATION) 2 TIMES DAILY
Qty: 12 G | Refills: 1 | Status: SHIPPED | OUTPATIENT
Start: 2020-02-28

## 2020-02-28 ASSESSMENT — MIFFLIN-ST. JEOR: SCORE: 1602.76

## 2020-02-28 NOTE — PROGRESS NOTES
"Subjective     Peewee Wilson is a 30 year old female who presents to clinic today for the following health issues:    HPI     Right breast pain with discharge and sometimes with a little bit of blood x on/off x 7-8 mo  Discharge has an odor.    Has had nipple piercings, removed in 2018  First time she noticed blood was just recently when trying to get discharge out.  Did have some discharge on left but that was after stop nursing but that has resolved.  No longer breast feeding.  No lumps.  No fevers.  No family hx of breast cancer.  No marijuana use.      Was sick a month ago and her albuterol was not helping a much.  Has a lot of strenuous exercise with work.  Feels that makes things worse.            Patient Active Problem List   Diagnosis     History of  delivery, currently pregnant     Exercise-induced asthma     Cervical cancer screening     Past Surgical History:   Procedure Laterality Date     ABDOMEN SURGERY      2 c-sections     GYN SURGERY      dermoid cyst       Social History     Tobacco Use     Smoking status: Former Smoker     Smokeless tobacco: Never Used   Substance Use Topics     Alcohol use: No     History reviewed. No pertinent family history.          Reviewed and updated as needed this visit by Provider  Allergies  Meds         Review of Systems   As above      Objective    /68 (BP Location: Right arm, Patient Position: Sitting, Cuff Size: Adult Large)   Pulse 73   Temp 98.2  F (36.8  C) (Oral)   Ht 1.67 m (5' 5.75\")   Wt 88 kg (194 lb)   Breastfeeding No   BMI 31.55 kg/m    Body mass index is 31.55 kg/m .  Physical Exam  Constitutional:       General: She is not in acute distress.  Cardiovascular:      Rate and Rhythm: Normal rate and regular rhythm.   Pulmonary:      Effort: Pulmonary effort is normal.      Breath sounds: Normal breath sounds.   Chest:      Breasts:         Right: Tenderness (under nipple ) present. No bleeding, mass, nipple discharge or skin change.     " "    Left: No mass, nipple discharge, skin change or tenderness.   Lymphadenopathy:      Upper Body:      Right upper body: No axillary adenopathy.      Left upper body: No axillary adenopathy.   Neurological:      Mental Status: She is alert.            Diagnostic Test Results:  Labs reviewed in Epic        Assessment & Plan     1. Breast discharge  If imaging normal get tsh and prolactin.    - MA Diagnostic Digital Bilateral; Future  - US Breast Right Complete 4 Quadrants; Future    2. Exercise-induced asthma  Add flovent.  Still not at goal.    - fluticasone (FLOVENT HFA) 110 MCG/ACT inhaler; Inhale 1 puff into the lungs 2 times daily  Dispense: 12 g; Refill: 1     BMI:   Estimated body mass index is 31.55 kg/m  as calculated from the following:    Height as of this encounter: 1.67 m (5' 5.75\").    Weight as of this encounter: 88 kg (194 lb).               Return in about 4 weeks (around 3/27/2020) for asthma.    Becky Gilbert PA-C  [facility]        "

## 2020-02-29 ASSESSMENT — ASTHMA QUESTIONNAIRES: ACT_TOTALSCORE: 14

## 2020-03-04 ENCOUNTER — ANCILLARY PROCEDURE (OUTPATIENT)
Dept: MAMMOGRAPHY | Facility: CLINIC | Age: 30
End: 2020-03-04
Attending: PHYSICIAN ASSISTANT
Payer: COMMERCIAL

## 2020-03-04 ENCOUNTER — ANCILLARY PROCEDURE (OUTPATIENT)
Dept: ULTRASOUND IMAGING | Facility: CLINIC | Age: 30
End: 2020-03-04
Attending: PHYSICIAN ASSISTANT
Payer: COMMERCIAL

## 2020-03-04 DIAGNOSIS — N64.52 BREAST DISCHARGE: ICD-10-CM

## 2020-03-04 PROCEDURE — 77066 DX MAMMO INCL CAD BI: CPT

## 2020-03-04 PROCEDURE — 76642 ULTRASOUND BREAST LIMITED: CPT | Mod: RT

## 2020-03-04 PROCEDURE — G0279 TOMOSYNTHESIS, MAMMO: HCPCS

## 2020-03-10 ENCOUNTER — HEALTH MAINTENANCE LETTER (OUTPATIENT)
Age: 30
End: 2020-03-10

## 2020-03-24 DIAGNOSIS — N64.52 BREAST DISCHARGE: ICD-10-CM

## 2020-03-24 LAB
PROLACTIN SERPL-MCNC: 17 UG/L (ref 3–27)
TSH SERPL DL<=0.005 MIU/L-ACNC: 0.83 MU/L (ref 0.4–4)

## 2020-03-24 PROCEDURE — 84146 ASSAY OF PROLACTIN: CPT | Performed by: PHYSICIAN ASSISTANT

## 2020-03-24 PROCEDURE — 84443 ASSAY THYROID STIM HORMONE: CPT | Performed by: PHYSICIAN ASSISTANT

## 2020-03-24 PROCEDURE — 36415 COLL VENOUS BLD VENIPUNCTURE: CPT | Performed by: PHYSICIAN ASSISTANT

## 2020-05-08 ENCOUNTER — VIRTUAL VISIT (OUTPATIENT)
Dept: FAMILY MEDICINE | Facility: OTHER | Age: 30
End: 2020-05-08

## 2020-05-08 NOTE — PROGRESS NOTES
"Date: 2020 09:42:59  Clinician: Tony Middleton  Clinician NPI: [id number]  Patient: Peewee Wilson  Patient : 1990  Patient Address: [address] [place] [address]  Patient Phone: [phone]  Visit Protocol: URI  Patient Summary:  Peewee is a 30 year old ( : 1990 ) female who initiated a Visit for cold, sinus infection, or influenza. When asked the question \"Please sign me up to receive news, health information and promotions. \", Peewee responded \"No\".    Peewee states her symptoms started gradually 5-6 days ago. After her symptoms started, they improved and then got worse again.   Her symptoms consist of myalgia, facial pain or pressure, nasal congestion, ageusia, anosmia, a headache, and malaise. She is experiencing mild difficulty breathing with activities but can speak normally in full sentences.   Symptom details     Nasal secretions: The color of her mucus is yellow.    Facial pain or pressure: The facial pain or pressure does not feel worse when bending or leaning forward.     Headache: She states the headache is moderate (4-6 on a 10 point pain scale).      Peewee denies having fever, rhinitis, sore throat, cough, vomiting, nausea, teeth pain, diarrhea, ear pain, wheezing, and chills. She also denies taking antibiotic medication for the symptoms, having a sinus infection within the past year, and having recent facial or sinus surgery in the past 60 days.   Precipitating events  She has not recently been exposed to someone with influenza. Peewee has been in close contact with the following high risk individuals: children under the age of 5.   Pertinent COVID-19 (Coronavirus) information  Peewee does not work or volunteer as healthcare worker or a  and does not work or volunteer in a healthcare facility.   She does not live with a healthcare worker.   Peewee has not had a close contact with a laboratory-confirmed COVID-19 patient within 14 days of symptom " onset. She also has not had a close contact with a suspected COVID-19 patient within 14 days of symptom onset.   Pertinent medical history  Peewee does not get yeast infections when she takes antibiotics.   Peewee does not need a return to work/school note.   Weight: 195 lbs   Peewee does not smoke or use smokeless tobacco.   She denies pregnancy and denies breastfeeding. She has menstruated in the past month.   Additional information as reported by the patient (free text): I've been without taste and smell for 5 days now. I have some breathing difficulties because I have asthma   Weight: 195 lbs    MEDICATIONS: No current medications, ALLERGIES: Mucinex  Clinician Response:  Dear Peewee,      Your symptoms show that you may have coronavirus (COVID-19). This illness can cause fever, cough and trouble breathing. Many people get a mild case and get better on their own. Some people can get very sick.   Will I be tested for COVID-19?  We would recommend you be tested for coronavirus. Here is how to get that scheduled:   Call [phone]. Tell them you were referred by [facility] to have a COVID-19 test. You will be scheduled at one of our [facility] testing locations (drive-up). Please have your [facility] visit information ready when you call including your visit ID number to verify you were referred.    How can I protect others in the meantime?  First, stay home and away from others (self-isolate) until:   You've had no fever---and no medicine that reduces fever---for 3 full days (72 hours). And...    Your other symptoms have gotten better. For example, your cough or breathing has improved. And...    At least 10 days have passed since your symptoms started.   During this time:   Don't go to work, school or anywhere else.     Stay away from others in your home. No hugging, kissing or shaking hands.    Don't let anyone visit.    Cover your mouth and nose with a mask, tissue or wash cloth to avoid spreading germs.    Wash your  hands and face often. Use soap and water.   How can I take care of myself?  1.Take Tylenol (acetaminophen) for fever or pain. If you have liver or kidney problems, ask your family doctor if it's okay to take Tylenol.   Adults can take either:    650 mg (two 325 mg pills) every 4 to 6 hours, or...    1,000 mg (two 500 mg pills) every 8 hours as needed.     Note: Don't take more than 3,000 mg in one day.   For children, check the Tylenol bottle for the right dose. The dose is based on the child's age or weight.  2.If you have other health problems (like cancer, heart failure, an organ transplant or severe kidney disease): Call your specialty clinic if you don't feel better in the next 2 days.  3.Know when to call 911: If your breathing is so bad that it keeps you from doing normal activities, call 911 or go to the emergency room. Tell them that you've been staying home and may have COVID-19.  4.Sign up for [employer]. We know it's scary to hear that you might have COVID-19. We want to track your symptoms to make sure you're okay over the next 2 weeks. Please look for an email from [employer]---this is a free, online program that we'll use to keep in touch. To sign up, follow the link in the email. Learn more at [email or web address].  Where can I get more information?  To learn more about COVID-19 and how to care for yourself at home, please visit the CDC website at https://www.cdc.gov/coronavirus/2019-ncov/about/steps-when-sick.html.  For more about your care at [facility], please visit [email or web address]     Diagnosis: Acute upper respiratory infection, unspecified  Diagnosis ICD: J06.9

## 2020-06-11 ENCOUNTER — VIRTUAL VISIT (OUTPATIENT)
Dept: FAMILY MEDICINE | Facility: OTHER | Age: 30
End: 2020-06-11

## 2020-06-12 DIAGNOSIS — Z20.822 SUSPECTED COVID-19 VIRUS INFECTION: Primary | ICD-10-CM

## 2020-06-12 PROCEDURE — U0003 INFECTIOUS AGENT DETECTION BY NUCLEIC ACID (DNA OR RNA); SEVERE ACUTE RESPIRATORY SYNDROME CORONAVIRUS 2 (SARS-COV-2) (CORONAVIRUS DISEASE [COVID-19]), AMPLIFIED PROBE TECHNIQUE, MAKING USE OF HIGH THROUGHPUT TECHNOLOGIES AS DESCRIBED BY CMS-2020-01-R: HCPCS | Performed by: FAMILY MEDICINE

## 2020-06-12 PROCEDURE — 99000 SPECIMEN HANDLING OFFICE-LAB: CPT | Performed by: FAMILY MEDICINE

## 2020-06-12 NOTE — PROGRESS NOTES
"Date: 2020 21:17:50  Clinician: Kayce Santoyo  Clinician NPI: [id number]  Patient: Peewee Wilson  Patient : 1990  Patient Address: [address] [place] [address]  Patient Phone: [phone]  Visit Protocol: URI  Patient Summary:  Peewee is a 30 year old ( : 1990 ) female who initiated a Visit for COVID-19 (Coronavirus) evaluation and screening. When asked the question \"Please sign me up to receive news, health information and promotions. \", Peewee responded \"Yes\".    Peewee states her symptoms started gradually 3-4 days ago.   Her symptoms consist of a cough. She is experiencing mild difficulty breathing with activities but can speak normally in full sentences.   Symptom details   Cough: Peewee coughs every 5-10 minutes and her cough is not more bothersome at night. Phlegm does not come into her throat when she coughs. She does not believe her cough is caused by post-nasal drip.    Peewee denies having wheezing, nausea, teeth pain, ageusia, diarrhea, sore throat, malaise, myalgias, anosmia, facial pain or pressure, fever, nasal congestion, vomiting, rhinitis, ear pain, headache, and chills. She also denies having recent facial or sinus surgery in the past 60 days, double sickening (worsening symptoms after initial improvement), and taking antibiotic medication for the symptoms.   Precipitating events  She has not recently been exposed to someone with influenza. Peewee has been in close contact with the following high risk individuals: children under the age of 5.   Pertinent COVID-19 (Coronavirus) information  In the past 14 days, Peewee has not worked in a congregate living setting.   She does not work or volunteer as healthcare worker or a  and does not work or volunteer in a healthcare facility.   Peewee also has not lived in a congregate living setting in the past 14 days. She does not live with a healthcare worker.   Peewee has had a close contact with a " laboratory-confirmed COVID-19 patient within 14 days of symptom onset. Additional information about contact with COVID-19 (Coronavirus) patient as reported by the patient (free text): My mom tested positive and I've been in contact with her   Pertinent medical history  Peewee does not get yeast infections when she takes antibiotics.   Peewee needs a return to work/school note.   Weight: 195 lbs   Peewee does not smoke or use smokeless tobacco.   She denies pregnancy and denies breastfeeding. She has menstruated in the past month.   Weight: 195 lbs    MEDICATIONS: No current medications, ALLERGIES: Mucinex  Clinician Response:  Dear Peewee,   Your symptoms show that you may have coronavirus (COVID-19). This illness can cause fever, cough and trouble breathing. Many people get a mild case and get better on their own. Some people can get very sick.  Based on the symptoms you have shared, I would like you to be re-checked in 2 to 3 days. Please call your family clinic to set up a video or phone visit.  Will I be tested for COVID-19?  We would like to test you for this virus. This will be a curbside test done outside the clinic.  Please call [phone] to schedule your visit. Explain that you were referred by [facility] to have a COVID-19 test. Be ready to share your [facility] visit [facility] number.   The following will serve as your written order for this COVID Test, ordered by me, for the indication of suspected COVID [Z20.828]: The test will be ordered in [employer], our electronic health record, after you are scheduled. It will show as ordered and authorized by Henry [place] MD.  Order: COVID-19 (Coronavirus) PCR for SYMPTOMATIC testing from [facility].  1.When it's time for your COVID test:   Stay at least 6 feet away from others. (If someone will drive you to your test, stay in the backseat, as far away from the  as you can.)   Cover your mouth and nose with a mask, tissue or washcloth.  Go straight to the testing site. Don't make  "any stops on the way there or back.      2.Starting now: Stay home and away from others (self-isolate) until:   You've had no fever---and no medicine that reduces fever---for 3 full days (72 hours). And...   Your other symptoms have gotten better. For example, your cough or breathing has improved. And...   At least 10 days have passed since your symptoms started.       During this time, don't leave the house except for testing or medical care.   Stay in your own room, even for meals. Use your own bathroom if you can.   Stay away from others in your home. No hugging, kissing or shaking hands. No visitors.  Don't go to work, school or anywhere else.    Clean \"high touch\" surfaces often (doorknobs, counters, handles, etc.). Use a household cleaning spray or wipes. You'll find a full list of  on the EPA website: www.epa.gov/pesticide-registration/list-n-disinfectants-use-against-sars-cov-2.   Cover your mouth and nose with a mask, tissue or washcloth to avoid spreading germs.  Wash your hands and face often. Use soap and water.  Caregivers in these groups are at risk for severe illness due to COVID-19:  o People 65 years and older  o People who live in a nursing home or long-term care facility  o People with chronic disease (lung, heart, cancer, diabetes, kidney, liver, immunologic)   o People who have a weakened immune system, including those who:   Are in cancer treatment  Take medicine that weakens the immune system, such as corticosteroids  Had a bone marrow or organ transplant  Have an immune deficiency  Have poorly controlled HIV or AIDS  Are obese (body mass index of 40 or higher)  Smoke regularly   o Caregivers should wear gloves while washing dishes, handling laundry and cleaning bedrooms and bathrooms.  o Use caution when washing and drying laundry: Don't shake dirty laundry, and use the warmest water setting that you can.  o For more tips, go to " www.cdc.gov/coronavirus/2019-ncov/downloads/10Things.pdf.      How can I take care of myself?   Get lots of rest. Drink extra fluids (unless a doctor has told you not to)   Take Tylenol (acetaminophen) for fever or pain. If you have liver or kidney problems, ask your family doctor if it's okay to take Tylenol.   Adults can take either:    650 mg (two 325 mg pills) every 4 to 6 hours, or...   1,000 mg (two 500 mg pills) every 8 hours as needed.    Note: Don't take more than 3,000 mg in one day. Acetaminophen is found in many medicines (both prescribed and over-the-counter medicines). Read all labels to be sure you don't take too much.   For children, check the Tylenol bottle for the right dose. The dose is based on the child's age or weight.    If you have other health problems (like cancer, heart failure, an organ transplant or severe kidney disease): Call your specialty clinic if you don't feel better in the next 2 days.       Know when to call 911. Emergency warning signs include:    Trouble breathing or shortness of breath Pain or pressure in the chest that doesn't go away Feeling confused like you haven't felt before, or not being able to wake up Bluish-colored lips or face  Where can I get more information?   [facility] -- About COVID-19: [email or web address]   CDC -- What to Do If You're Sick: www.cdc.gov/coronavirus/2019-ncov/about/steps-when-sick.html   CDC -- Ending Home Isolation: www.cdc.gov/coronavirus/2019-ncov/hcp/disposition-in-home-patients.html   CDC -- Caring for Someone: www.cdc.gov/coronavirus/2019-ncov/if-you-are-sick/care-for-someone.html   [facility] -- Interim Guidance for Hospital Discharge to Home: www.health.[facility].[place]/diseases/coronavirus/hcp/hospdischarge.pdf   [facility] clinical trials (COVID-19 research studies): clinicalaffairs.[facility]-clinical-trials    Below are the COVID-19 hotlines at the [place] Department of Health ([facility]). Interpreters are  available.    For health questions: Call [phone] or 1-672.203.3790 (7 a.m. to 7 p.m.) For questions about schools and childcare: Call 100-030-9463 or 1-783.160.1330 (7 a.m. to 7 p.m.)       Diagnosis: Cough  Diagnosis ICD: R05

## 2020-06-12 NOTE — LETTER
June 14, 2020        Peewee Wilson  [address] [place] [id number]    This letter provides a written record that you were tested for COVID-19 on 6/13/20.     Your result was positive.     This means that we found the virus that causes COVID-19 in your sample.    How can I protect others?    For safety, it s very important to follow these rules.    First, stay home and away from others (self-isolate) until:      You ve had no fever--and no medicine that reduces fever--for 3 full days (72 hours). And      Your other symptoms have gotten better. For example, your cough or breathing has improved. And     At least 10 days have passed since your symptoms started.    During this time:    Stay in your own room (and use your own bathroom), if you can.    Stay away from others in your home. No hugging, kissing or shaking hands.    Don t let anyone visit.    Don t go to work, school or anywhere else.     Clean  high touch  surfaces often (doorknobs, counters, handles, etc.). Use a household cleaning spray or wipes.    Cover your mouth and nose with a mask, tissue or washcloth to avoid spreading germs.    Wash your hands and face often with soap and water.    You should not go back to work until you meet the guidelines above for ending your home isolation. You should meet these along with any other guidelines that your employer has.    Employers: This document serves as formal notice of your employee s medical guidelines for going back to work. They must meet the above guidelines before going back to work in person.    How can I take care of myself?    1. Get lots of rest. Drink extra fluids (unless a doctor has told you not to).      2. Take Tylenol (acetaminophen) for fever or pain. If you have liver or kidney problems, ask your family doctor if it s okay to take Tylenol.     Take either:     650 mg (two 325 mg pills) every 4 to 6 hours, or     1,000 mg (two 500 mg pills) every 8 hours as needed.     Note: Don t  take more than 3,000 mg in one day. Acetaminophen is found in many medicines (both prescribed and over-the-counter medicines). Read all labels to be sure you don t take too much.    For children, check the Tylenol bottle for the right dose. The dose is based on the child s age or weight.    3. If you have other health problems (like cancer, heart failure, an organ transplant or severe kidney disease): Call your specialty clinic if you don t feel better in the next 2 days.    4. Know when to call 911: If your breathing is so bad that it keeps you from doing normal activities, call 911 or go to the emergency room. Tell them that you ve been staying home and may have COVID-19.    5. Sign up for [employer]. We know it s scary to hear that you have COVID-19. We want to track your symptoms to make sure you re okay over the next 2 weeks. Please look for an email from [employer]--this is a free, online program that we ll use to keep in touch. To sign up, follow the link in the email. Learn more at [email or web address].    6. Interested is participating in research? Visit the link below to view current clinical trials that apply to your situation:  [email or web address]/um-clinical-trials      Where can I get more information?    To learn the [facility] guidelines for staying home, please visit the [facility] of Health website at https://www.health.state.[place].us/diseases/coronavirus/basics.html.    To learn more about COVID-19 and how to care for yourself at home, please visit the CDC website at https://www.cdc.gov/coronavirus/2019-ncov/about/steps-when-sick.html.    For more options for care at [facility], please visit our website at [email or web address]

## 2020-06-14 ENCOUNTER — TELEPHONE (OUTPATIENT)
Dept: LAB | Facility: CLINIC | Age: 30
End: 2020-06-14

## 2020-06-14 LAB
SARS-COV-2 RNA SPEC QL NAA+PROBE: ABNORMAL
SPECIMEN SOURCE: ABNORMAL

## 2020-06-14 NOTE — TELEPHONE ENCOUNTER
Coronavirus (COVID-19) Notification    Patient  Peewee Wilson    Reason for call  Notify of Positive Coronavirus (COVID-19) lab results, assess symptoms,  review [facility] recommendations    Lab Result    Lab test:  2019-nCoV rRt-PCR or SARS-CoV-2 PCR    Oropharyngeal AND/OR nasopharyngeal swabs is POSITIVE for 2019-nCoV RNA/SARS-COV-2 PCR (COVID-19 virus)    RN Recommendations/Instructions per [facility] Coronavirus COVID-19 recommendations    Brief introduction script  Hi, My name is Melissa CHAVZE and I am calling on behalf of [employer].  We were notified that your Coronavirus test (COVID-19) for was POSITIVE for the virus.  I have some information to relay to you but first I wanted to mention that the [place] Dept of Health will be contacting you shortly [it's possible MD already called Patient] to talk to you more about how you are feeling and other people you have had contact with who might now also have the virus.  Also, [facility] is Partnering with the [facility] for Covid-19 research, you may be contacted directly by research staff.    Assessment (Inquire about Patient's current symptoms)  Onset of symptoms 6/10/20,  Dry cough only.  Pt has asthma and she feels she does have slight SOB but thinks it is secondary to asthma.Denies fever, chills, or fatigue.     If at time of call, Patients symptoms hare worsened, the Patient should contact 911 or have someone drive them to Emergency Dept promptly:      If Patient calling 911, inform 911 personal that you have tested positive for the Coronavirus (COVID-19).  Place mask on and await 911 to arrive.    If Emergency Dept, If possible, please have another adult drive you to the Emergency Dept but you need to wear mask when in contact with other people.      Review information with Patient    Your result was positive. This means you have COVID-19 (coronavirus).  We have sent you a letter that reviews the information that I'll be reviewing  with you now.    How can I protect others?    If you have symptoms: stay home and away from others (self-isolate) until:    You've had no fever--and no medicine that reduces fever--for 3 full days (72 hours). And      Your other symptoms have gotten better. For example, your cough or breathing has improved. And     At least 10 days have passed since your symptoms started.    If you don't have symptoms: Stay home and away from others (self-isolate) until at least 10 days have passed since your first positive COVID-19 test. (Date test collected)    During this time:    Stay in your own room, including for meals. Use your own bathroom if you can.    Stay away from others in your home. No hugging, kissing or shaking hands. No visitors.     Don't go to work, school or anywhere else.     Clean  high touch  surfaces often (doorknobs, counters, handles, etc.). Use a household cleaning spray or wipes. You'll find a full list on the EPA website at www.epa.gov/pesticide-registration/list-n-disinfectants-use-against-sars-cov-2.     Cover your mouth and nose with a mask, tissue or washcloth to avoid spreading germs.    Wash your hands and face often with soap and water.    Caregivers in these groups are at risk for severe illness due to COVID-19:  o People 65 years and older  o People who live in a nursing home or long-term care facility  o People with chronic disease (lung, heart, cancer, diabetes, kidney, liver, immunologic)  o People who have a weakened immune system, including those who:  - Are in cancer treatment  - Take medicine that weakens the immune system, such as corticosteroids  - Had a bone marrow or organ transplant  - Have an immune deficiency  - Have poorly controlled HIV or AIDS  - Are obese (body mass index of 40 or higher)  - Smoke regularly    Caregivers should wear gloves while washing dishes, handling laundry and cleaning bedrooms and bathrooms.    Wash and dry laundry with special caution. Don't shake  dirty laundry, and use the warmest water setting you can.    If you have a weakened immune system, ask your doctor about other actions you should take.    For more tips, go to www.cdc.gov/coronavirus/2019-ncov/downloads/10Things.pdf.    You should not go back to work until you meet the guidelines above for ending your home isolation. You should meet these along with any other guidelines that your employer has.    Employers: This document serves as formal notice of your employee's medical guidelines for going back to work. They must meet the above guidelines before going back to work in person.    How can I take care of myself?    1. Get lots of rest. Drink extra fluids (unless a doctor has told you not to).    2. Take Tylenol (acetaminophen) for fever or pain. If you have liver or kidney problems, ask your family doctor if it's okay to take Tylenol.     Take either:     650 mg (two 325 mg pills) every 4 to 6 hours, or     1,000 mg (two 500 mg pills) every 8 hours as needed.     Note: Don't take more than 3,000 mg in one day. Acetaminophen is found in many medicines (both prescribed and over-the-counter medicines). Read all labels to be sure you don't take too much.    For children, check the Tylenol bottle for the right dose (based on their age or weight).    3. If you have other health problems (like cancer, heart failure, an organ transplant or severe kidney disease): Call your specialty clinic if you don't feel better in the next 2 days.    4. Know when to call 911: Emergency warning signs include:    Trouble breathing or shortness of breath    Pain or pressure in the chest that doesn't go away    Feeling confused like you haven't felt before, or not being able to wake up    Bluish-colored lips or face    5. Sign up for [employer]. We know it's scary to hear that you have COVID-19. We want to track your symptoms to make sure you're okay over the next 2 weeks. Please look for an email from [employer]--this is  a free, online program that we'll use to keep in touch. To sign up, follow the link in the email. Learn more at [email or web address].    Where can I get more information?    [facility]: [email or web address]    Coronavirus Basics: www.health.[facility]./diseases/coronavirus/basics.html    What to Do If You're Sick: www.cdc.gov/coronavirus/2019-ncov/about/steps-when-sick.html    Ending Home Isolation: www.cdc.gov/coronavirus/2019-ncov/hcp/disposition-in-home-patients.html     Caring for Someone with COVID-19: www.cdc.gov/coronavirus/2019-ncov/if-you-are-sick/care-for-someone.html     [facility] clinical trials (COVID-19 research studies): clinicalaffairs.[facility]/n-clinical-trials     Positive COVID-19 letter sent (Yes/No):  YES    Pt verbalizes understanding of above information.    [Name]  Melissa Kong RN

## 2020-08-28 ENCOUNTER — TELEPHONE (OUTPATIENT)
Dept: FAMILY MEDICINE | Facility: CLINIC | Age: 30
End: 2020-08-28

## 2020-08-28 NOTE — LETTER
August 28, 2020    Peewee Wilson  [address] [place] [id number]    Dear Peewee,    Your [facility] Team works hard to make sure that you and your family receive exceptional care. Enclosed you will find a copy of the Asthma Control Test (ACT) that our clinic uses to monitor and manage your asthma. This test is an assessment tool that we use to determine how well your asthma is controlled.      Please complete the enclosed questionnaire and mail it back to us in the self-addressed stamped envelope. We can also complete the questions over the phone if you keep a copy of the ACT for your reference, call [phone] to speak with a member of your care team.      Healthy regards,    Your [facility] Care Team    (Team 3)

## 2020-08-28 NOTE — TELEPHONE ENCOUNTER
Panel Management Review      Patient has the following on her problem list:     Asthma review     ACT Total Scores 2/28/2020   ACT TOTAL SCORE (Goal Greater than or Equal to 20) 14   In the past 12 months, how many times did you visit the emergency room for your asthma without being admitted to the hospital? 0   In the past 12 months, how many times were you hospitalized overnight because of your asthma? 0      1. Is Asthma diagnosis on the Problem List? Yes    2. Is Asthma listed on Health Maintenance? Yes    3. Patient is due for:  ACT and AAP      Composite cancer screening  Chart review shows that this patient is due/due soon for the following None  Summary:    Patient is due/failing the following:   AAP and ACT    Action needed:   Patient needs to do ACT.    Type of outreach:    Sent letter. and Copy of ACT mailed to patient, will reach out in 5 days.    Questions for provider review:    None                                                                                                                                    Ailyn Combs, /        Chart routed to Care Team .

## 2020-09-29 ENCOUNTER — ALLIED HEALTH/NURSE VISIT (OUTPATIENT)
Dept: PEDIATRICS | Facility: CLINIC | Age: 30
End: 2020-09-29
Payer: COMMERCIAL

## 2020-09-29 DIAGNOSIS — Z23 NEED FOR PROPHYLACTIC VACCINATION AND INOCULATION AGAINST INFLUENZA: Primary | ICD-10-CM

## 2020-09-29 PROCEDURE — 90686 IIV4 VACC NO PRSV 0.5 ML IM: CPT

## 2020-09-29 PROCEDURE — 90471 IMMUNIZATION ADMIN: CPT

## 2020-09-29 PROCEDURE — 99207 ZZC NO CHARGE NURSE ONLY: CPT

## 2020-10-12 ENCOUNTER — MYC REFILL (OUTPATIENT)
Dept: FAMILY MEDICINE | Facility: CLINIC | Age: 30
End: 2020-10-12

## 2020-10-12 ENCOUNTER — VIRTUAL VISIT (OUTPATIENT)
Dept: DERMATOLOGY | Facility: CLINIC | Age: 30
End: 2020-10-12
Payer: COMMERCIAL

## 2020-10-12 DIAGNOSIS — J45.990 EXERCISE-INDUCED ASTHMA: ICD-10-CM

## 2020-10-12 DIAGNOSIS — R07.9 CHEST PAIN, UNSPECIFIED TYPE: ICD-10-CM

## 2020-10-12 DIAGNOSIS — L70.0 ACNE VULGARIS: Primary | ICD-10-CM

## 2020-10-12 PROCEDURE — 99202 OFFICE O/P NEW SF 15 MIN: CPT | Mod: 95 | Performed by: DERMATOLOGY

## 2020-10-12 RX ORDER — CLINDAMYCIN PHOSPHATE 10 UG/ML
LOTION TOPICAL
Qty: 60 ML | Refills: 11 | Status: SHIPPED | OUTPATIENT
Start: 2020-10-12 | End: 2022-05-05

## 2020-10-12 RX ORDER — TRETINOIN 0.25 MG/G
CREAM TOPICAL
Qty: 45 G | Refills: 11 | Status: SHIPPED | OUTPATIENT
Start: 2020-10-12 | End: 2022-05-05

## 2020-10-12 RX ORDER — DOXYCYCLINE 100 MG/1
100 CAPSULE ORAL 2 TIMES DAILY
Qty: 60 CAPSULE | Refills: 2 | Status: SHIPPED | OUTPATIENT
Start: 2020-10-12 | End: 2022-05-05

## 2020-10-12 NOTE — LETTER
10/12/2020         RE: Peewee Wilson  [address] [place] [id number]        Dear Colleague,    Thank you for referring your patient, Peewee Wilson, to the [facility]. Please see a copy of my visit note below.    [facility] Dermatology Record:  Store and Forward and Telephone [phone]      Dermatology Problem List:  1. Acne vulgaris  - doxycycline 100 mg PO BID  - tretinoin 0.025% cream at bedtime / clindamycin 1% lotion / benzoyl peroxide 4-5% wash    Encounter Date: Oct 12, 2020    CC:   Chief Complaint   Patient presents with     Derm Problem       History of Present Illness:  Peewee Wilson is a 30 year old female who presents for evaluation of acne vulgaris. She states has had about a 1-year history of acne worsening over the last year, especially over the last 6 months. She had tubal ligation 2 years ago after giving birth to her child. Prior to her pregnancy, had been on an OCP. She reports no history of irregular menstrual cycles or heavy periods. No history of acne in her teenage years. Has had some hair growth on the upper lip in the past, though none currently. She has a history of asthma, but is otherwise healthy. In terms of treatment, has tried an OTC Neutrogena sal acid wash, but otherwise no other topical or systemic therapies. Health otherwise stable. No other skin concerns.     ROS: Patient is generally feeling well today.     Physical Examination:  General: Well-appearing female, appropriately-developed individual.  Skin: Focused examination including face and neck was performed.   - Long Type V  - Numerous open/closed comedones, acneiform papules, pustules, and few inflammatory nodules with admixed pink to hyperpigmented macules c/w scarring on bilateral chin, jawline, cheeks.                     Labs:  None.     Past Medical History:   Patient Active Problem List   Diagnosis     Exercise-induced asthma     Past Medical History:   Diagnosis Date     Asthma       Cervical cancer screening     10/7/09 NIL pap, + HR HPV (not 16 or 18)  9/1/10 NIL Pap, Neg HPV 11 NIL pap 7/3/12 ASCUS pap, neg HPV 3/9/16 NIL Pap, Neg HPV. 19 NIL Pap, Neg HPV. Plan: cotest in 5 yr     History of  delivery, currently pregnant 2018     Migraines      Past Surgical History:   Procedure Laterality Date     ABDOMEN SURGERY      2 c-sections     GYN SURGERY      dermoid cyst       Social History:  Patient reports that she has quit smoking. She has never used smokeless tobacco. She reports that she does not drink alcohol or use drugs.    Family History:  No family history on file.    Medications:  Current Outpatient Medications   Medication     albuterol (PROAIR HFA/PROVENTIL HFA/VENTOLIN HFA) 108 (90 Base) MCG/ACT inhaler     fluticasone (FLOVENT HFA) 110 MCG/ACT inhaler     No current facility-administered medications for this visit.        No Known Allergies      Impression and Recommendations (Patient Counseled on the Following):    1. Acne vulgaris, moderate to severe papulopustular / nodulocystic acne with scarring. Recommended trial of topical therapies and oral antibiotic as below. If not improved, consider oral spironolactone or isotretinoin.   - Start tretinoin 0.025% cream at bedtime  - Start clindamycin 1% lotion qdaily with OTC BP 4-5% wash  - Start doxycycline 100 mg PO BID x  3 months    Follow-up:   Follow-up with dermatology in approximately 3 months. Earlier for new or changing lesions or rash.      Staff only    Edinson Catalan MD  Pronouns: he/him/his    Department of Dermatology  [facility]: Phone: [phone], Fax:[phone]  [facility]: Phone: [phone] Fax: [phone]    _____________________________________________________________________________    Teledermatology information:  - Location of patient: [place]  -  "Patient presented as: self referral  - Location of teledermatologist:  ([facility] )  - Reason teledermatology is appropriate:  of National Emergency Regarding Coronavirus disease (COVID 19) Outbreak  - Image quality and interpretability: acceptable  - Physician has received verbal consent for a Video/Photos Visit from the patient? YES  - In-person dermatology visit recommendation: no  - Date of images: 10/12/2020  - Service start time: 9:45 AM  - Service end time:9:59 AM  - Date of report: 10/12/2020     Teledermatology Nurse Call for NEW Patients (not seen in last 3 years):     The patient was contacted by phone and we reviewed they have a visit in teledermatology upcoming with an MD or PAMADISON;  Importantly, \"a teledermatology visit may not be as thorough as an in-person visit, and the quality of the photograph and/or video sent may not be of the same quality as that taken by the dermatology clinic.\"     This video visit will be conducted via a call between you and your physician/provider via [employer]. We have found that certain health care needs can be provided without the need for an in-person physical exam.  This service lets us provide the care you need with a video conversation.  If a prescription is necessary we can send it directly to your pharmacy.  If lab work is needed we can place an order for that and you can then stop by our lab to have the test done at a later time.If during the course of the call the physician/provider feels a video visit is not appropriate, you will not be charged for this service.Visits are billed at different rates depending on your insurance coverage. Please reach out to your insurance provider with any questions.    The patient will also send photographs via [email or web address] for review. Instructions for photography are/were sent to the patient via [email or web address] messaging.       The patient verified the location of the photo/video visit to be [place].(The physician must " be notified by nurse if the patient is not in the state of [place] during the encounter)    The patient denied skin pain, fever, mucosal symptoms(lesions, blisters, sores in the mouth, nose, eyes, or genitals)  IF PATIENT ENDORSES ANY OF THESE STOP AND PAGE ON CALL ATTENDING    Additional notes:  Patient summary of issue: Acne on chin. Painful bumps, unsure if it correlates with her tubal ligation. She noticed it was after her tubal, started in March or April. Worse around time of menses.  Location of problem on body: Chin mostly, a few bumps in between eyebrows   How long has area/symptoms been present: March/ April  What makes it better?: Nothing  What makes it worse?: Different textures on washcloths and wearing a mask  Other symptoms include the following: Painful  Which medications have been tried, for how long, and did they make it better or worse (ex. Triamcinolone, used twice daily for 2 weeks, not improved): Has tried Cetaphil, Noxzema, Neutrogena cleanser, all natural products without chemicals. Notes that sometimes skin will clear up slightly but then will have a flare up shortly after.   The patient has not seen a dermatologist.   The patient hasno past medical history of skin cancer  ROS: The patient is generally feeling well. Asthma worse than usual and noted some back pain.     Nilda Sheridan LPN        Again, thank you for allowing me to participate in the care of your patient.        Sincerely,        Edinson Catalan MD

## 2020-10-12 NOTE — PATIENT INSTRUCTIONS
[facility] Dermatology Visit    Thank you for allowing us to participate in your care. Your findings, instructions and follow-up plan are as follows:    Acne vulgaris.     1. Start using tretinoin 0.025% cream at nighttime before bed. Apply a pea-sized amount evenly over the entire face at nighttime before bed.  2. Start using clindamycin 1% lotion once daily to the face in the morning  3. Recommend purchasing an over-the-counter benzoyl peroxide wash for use in the shower. This is available at pharmacies and/or retail outlets like [employer].   4. Start taking doxycycline 100 mg (1 tablet) twice daily for 3 months. See side effects below.   5. Follow-up in 3 months.           When should I call my doctor?    If you are worsening or not improving, please, contact us or seek urgent care as noted below.     Who should I call with questions (adults)?    [facility] (adult and pediatric): [phone]     [facility] (adult): [phone]    For urgent needs outside of business hours call the [facility] at [phone] and ask for the dermatology resident on call    If this is a medical emergency and you are unable to reach an ER, Call [phone]      Who should I call with questions (pediatric)?  [facility]- Pediatric Dermatology  Dr. Danyelle Qureshi, Dr. Daisy Mijares, Dr. Radha Hill, Leatha Sher, PA  Dr. Keiko Traore, Dr. Marisol Samuel & Dr. Hussain Becker  Non Urgent  Nurse Triage Line; [phone]- Bobbi and Ann CHAVEZ Care Coordinators   Lynette (/Complex ) [phone]    If you need a prescription refill, please contact your pharmacy. Refills are approved or denied by our Physicians during normal business hours, Monday through Fridays  Per office policy, refills will not be granted if you have not been seen within the past year (or sooner depending on your child's  condition)    Scheduling Information:  Pediatric Appointment Scheduling and Call Center [phone]  Radiology Scheduling- [phone]  Sedation Unit Scheduling- [phone]  [place] Scheduling- General [phone]; Pediatric Dermatology [phone]  Main  Services: [phone]  Tanzanian: [phone]  Gabonese: [phone]  Hmong/Trinidadian/Tajik: [phone]  Preadmission Nursing Department Fax Number: [phone] (Fax all pre-operative paperwork to this number)    For urgent matters arising during evenings, weekends, or holidays that cannot wait for normal business hours please call [phone] and ask for the Dermatology Resident On-Call to be paged.

## 2020-10-12 NOTE — PROGRESS NOTES
[facility] Dermatology Record:  Store and Forward and Telephone [phone]      Dermatology Problem List:  1. Acne vulgaris  - doxycycline 100 mg PO BID  - tretinoin 0.025% cream at bedtime / clindamycin 1% lotion / benzoyl peroxide 4-5% wash    Encounter Date: Oct 12, 2020    CC:   Chief Complaint   Patient presents with     Derm Problem       History of Present Illness:  Peewee Wilson is a 30 year old female who presents for evaluation of acne vulgaris. She states has had about a 1-year history of acne worsening over the last year, especially over the last 6 months. She had tubal ligation 2 years ago after giving birth to her child. Prior to her pregnancy, had been on an OCP. She reports no history of irregular menstrual cycles or heavy periods. No history of acne in her teenage years. Has had some hair growth on the upper lip in the past, though none currently. She has a history of asthma, but is otherwise healthy. In terms of treatment, has tried an OTC Neutrogena sal acid wash, but otherwise no other topical or systemic therapies. Health otherwise stable. No other skin concerns.     ROS: Patient is generally feeling well today.     Physical Examination:  General: Well-appearing female, appropriately-developed individual.  Skin: Focused examination including face and neck was performed.   - Long Type V  - Numerous open/closed comedones, acneiform papules, pustules, and few inflammatory nodules with admixed pink to hyperpigmented macules c/w scarring on bilateral chin, jawline, cheeks.                     Labs:  None.     Past Medical History:   Patient Active Problem List   Diagnosis     Exercise-induced asthma     Past Medical History:   Diagnosis Date     Asthma      Cervical cancer screening     10/7/09 NIL pap, + HR HPV (not 16 or 18)  9/1/10 NIL Pap, Neg HPV 11 NIL pap 7/3/12 ASCUS pap, neg HPV 3/9/16 NIL Pap, Neg HPV. 19 NIL Pap, Neg HPV. Plan: cotest in 5 yr     History of  delivery,  currently pregnant 1/12/2018     Migraines      Past Surgical History:   Procedure Laterality Date     ABDOMEN SURGERY      2 c-sections     GYN SURGERY      dermoid cyst       Social History:  Patient reports that she has quit smoking. She has never used smokeless tobacco. She reports that she does not drink alcohol or use drugs.    Family History:  No family history on file.    Medications:  Current Outpatient Medications   Medication     albuterol (PROAIR HFA/PROVENTIL HFA/VENTOLIN HFA) 108 (90 Base) MCG/ACT inhaler     fluticasone (FLOVENT HFA) 110 MCG/ACT inhaler     No current facility-administered medications for this visit.        No Known Allergies      Impression and Recommendations (Patient Counseled on the Following):    1. Acne vulgaris, moderate to severe papulopustular / nodulocystic acne with scarring. Recommended trial of topical therapies and oral antibiotic as below. If not improved, consider oral spironolactone or isotretinoin.   - Start tretinoin 0.025% cream at bedtime  - Start clindamycin 1% lotion qdaily with OTC BP 4-5% wash  - Start doxycycline 100 mg PO BID x  3 months    Follow-up:   Follow-up with dermatology in approximately 3 months. Earlier for new or changing lesions or rash.      Staff only    Edinson Catalan MD  Pronouns: he/him/his    Department of Dermatology  [facility]: Phone: [phone], Fax:[phone]  [facility]: Phone: [phone] Fax: [phone]    _____________________________________________________________________________    Teledermatology information:  - Location of patient: [place]  - Patient presented as: self referral  - Location of teledermatologist:  ([facility] )  - Reason teledermatology is appropriate:  of National Emergency Regarding Coronavirus disease (COVID 19) Outbreak  - Image quality and  "interpretability: acceptable  - Physician has received verbal consent for a Video/Photos Visit from the patient? YES  - In-person dermatology visit recommendation: no  - Date of images: 10/12/2020  - Service start time: 9:45 AM  - Service end time:9:59 AM  - Date of report: 10/12/2020     Teledermatology Nurse Call for NEW Patients (not seen in last 3 years):     The patient was contacted by phone and we reviewed they have a visit in teledermatology upcoming with an MD or PAMADISON;  Importantly, \"a teledermatology visit may not be as thorough as an in-person visit, and the quality of the photograph and/or video sent may not be of the same quality as that taken by the dermatology clinic.\"     This video visit will be conducted via a call between you and your physician/provider via [employer]. We have found that certain health care needs can be provided without the need for an in-person physical exam.  This service lets us provide the care you need with a video conversation.  If a prescription is necessary we can send it directly to your pharmacy.  If lab work is needed we can place an order for that and you can then stop by our lab to have the test done at a later time.If during the course of the call the physician/provider feels a video visit is not appropriate, you will not be charged for this service.Visits are billed at different rates depending on your insurance coverage. Please reach out to your insurance provider with any questions.    The patient will also send photographs via [employer] for review. Instructions for photography are/were sent to the patient via [employer] messaging.       The patient verified the location of the photo/video visit to be [place].(The physician must be notified by nurse if the patient is not in the state of [place] during the encounter)    The patient denied skin pain, fever, mucosal symptoms(lesions, blisters, sores in the mouth, nose, eyes, or genitals)  IF PATIENT ENDORSES ANY OF THESE STOP " AND PAGE ON CALL ATTENDING    Additional notes:  Patient summary of issue: Acne on chin. Painful bumps, unsure if it correlates with her tubal ligation. She noticed it was after her tubal, started in March or April. Worse around time of menses.  Location of problem on body: Chin mostly, a few bumps in between eyebrows   How long has area/symptoms been present: March/ April  What makes it better?: Nothing  What makes it worse?: Different textures on washcloths and wearing a mask  Other symptoms include the following: Painful  Which medications have been tried, for how long, and did they make it better or worse (ex. Triamcinolone, used twice daily for 2 weeks, not improved): Has tried Cetaphil, Noxzema, Neutrogena cleanser, all natural products without chemicals. Notes that sometimes skin will clear up slightly but then will have a flare up shortly after.   The patient has not seen a dermatologist.   The patient hasno past medical history of skin cancer  ROS: The patient is generally feeling well. Asthma worse than usual and noted some back pain.     Nilda Sheridan LPN

## 2020-10-16 RX ORDER — ALBUTEROL SULFATE 90 UG/1
2 AEROSOL, METERED RESPIRATORY (INHALATION) EVERY 4 HOURS PRN
Qty: 18 G | Refills: 0 | Status: SHIPPED | OUTPATIENT
Start: 2020-10-16 | End: 2020-12-16

## 2020-10-16 NOTE — TELEPHONE ENCOUNTER
Routing refill request to provider for review/approval because:  Failed protocol.  Marisol Manning RN,BSN  [facility]

## 2020-12-16 ENCOUNTER — MYC REFILL (OUTPATIENT)
Dept: FAMILY MEDICINE | Facility: CLINIC | Age: 30
End: 2020-12-16

## 2020-12-16 DIAGNOSIS — J45.990 EXERCISE-INDUCED ASTHMA: ICD-10-CM

## 2020-12-16 DIAGNOSIS — R07.9 CHEST PAIN, UNSPECIFIED TYPE: ICD-10-CM

## 2020-12-18 RX ORDER — ALBUTEROL SULFATE 90 UG/1
2 AEROSOL, METERED RESPIRATORY (INHALATION) EVERY 4 HOURS PRN
Qty: 18 G | Refills: 0 | Status: SHIPPED | OUTPATIENT
Start: 2020-12-18 | End: 2021-07-13

## 2020-12-18 NOTE — TELEPHONE ENCOUNTER
MA: Please call patient and schedule asthma follow up     Routing refill request to provider for review/approval because:  Labs out of range:    ACT Total Scores 11/4/2019 2/28/2020   ACT TOTAL SCORE (Goal Greater than or Equal to 20) 16 14   In the past 12 months, how many times did you visit the emergency room for your asthma without being admitted to the hospital? 0 0   In the past 12 months, how many times were you hospitalized overnight because of your asthma? 0 0     Patient due for office visit.   Teri Estevez, RN, BSN [facility]

## 2021-01-15 ENCOUNTER — HEALTH MAINTENANCE LETTER (OUTPATIENT)
Age: 31
End: 2021-01-15

## 2021-07-12 NOTE — PATIENT INSTRUCTIONS
I will notify you of lab results via [employer]   Schedule pelvic ultrasound at [facility] ([phone]) formerly called [facility] and follow up with gynecology at [facility] ([phone]) formerly called [facility] a couple days after ultrasound  Increase flovent from 110 inhaler twice a day to 220 twice a day and follow up with us in one month- rinse out mouth after using flovent inhaler to prevent thrush.    Schedule colonoscopy and endoscopy at [facility] ([phone]) formerly called [facility].        Patient Education      Preventive Health Recommendations  Female Ages 26 - 39  Yearly exam:   See your health care provider every year in order to    Review health changes.     Discuss preventive care.      Review your medicines if you your doctor has prescribed any.    Until age 30: Get a Pap test every three years (more often if you have had an abnormal result).    After age 30: Talk to your doctor about whether you should have a Pap test every 3 years or have a Pap test with HPV screening every 5 years.   You do not need a Pap test if your uterus was removed (hysterectomy) and you have not had cancer.  You should be tested each year for STDs (sexually transmitted diseases), if you're at risk.   Talk to your provider about how often to have your cholesterol checked.  If you are at risk for diabetes, you should have a diabetes test (fasting glucose).  Shots: Get a flu shot each year. Get a tetanus shot every 10 years.   Nutrition:     Eat at least 5 servings of fruits and vegetables each day.    Eat whole-grain bread, whole-wheat pasta and brown rice instead of white grains and rice.    Get adequate Calcium and Vitamin D.     Lifestyle    Exercise at least 150 minutes a week (30 minutes a day, 5 days of the week). This will help you control your weight and prevent disease.    Limit alcohol to one drink per  day.    No smoking.     Wear sunscreen to prevent skin cancer.    See your dentist every six months for an exam and cleaning.

## 2021-07-12 NOTE — PROGRESS NOTES
SUBJECTIVE:   CC: Peewee Wilson is an 31 year old woman who presents for preventive health visit.       Patient has been advised of split billing requirements and indicates understanding: Yes  Healthy Habits:     Getting at least 3 servings of Calcium per day:  NO    Bi-annual eye exam:  Yes    Dental care twice a year:  Yes    Sleep apnea or symptoms of sleep apnea:  Daytime drowsiness    Diet:  Regular (no restrictions)    Frequency of exercise:  2-3 days/week    Duration of exercise:  15-30 minutes    Taking medications regularly:  Yes    Medication side effects:  None    PHQ-2 Total Score: 2    Additional concerns today:  No              Today's PHQ-2 Score:   PHQ-2 ( 1999 Pfizer) 11/4/2019   Q1: Little interest or pleasure in doing things 0   Q2: Feeling down, depressed or hopeless 0   PHQ-2 Score 0       Abuse: Current or Past (Physical, Sexual or Emotional) - No  Do you feel safe in your environment? Yes    Have you ever done Advance Care Planning? (For example, a Health Directive, POLST, or a discussion with a medical provider or your loved ones about your wishes): No, advance care planning information given to patient to review.  Patient declined advance care planning discussion at this time.    Social History     Tobacco Use     Smoking status: Former Smoker     Smokeless tobacco: Never Used   Substance Use Topics     Alcohol use: No     If you drink alcohol do you typically have >3 drinks per day or >7 drinks per week? No    Alcohol Use 7/13/2021   Prescreen: >3 drinks/day or >7 drinks/week? No   Prescreen: >3 drinks/day or >7 drinks/week? -       Reviewed orders with patient.  Reviewed health maintenance and updated orders accordingly - Yes  BP Readings from Last 3 Encounters:   07/13/21 112/82   02/28/20 106/68   12/12/19 122/77    Wt Readings from Last 3 Encounters:   07/13/21 79.8 kg (175 lb 14.4 oz)   02/28/20 88 kg (194 lb)   12/12/19 87.5 kg (193 lb)                  Patient Active Problem  List   Diagnosis     Exercise-induced asthma     Past Surgical History:   Procedure Laterality Date     ABDOMEN SURGERY      2 c-sections     GYN SURGERY      dermoid cyst     TUBAL LIGATION         Social History     Tobacco Use     Smoking status: Former Smoker     Smokeless tobacco: Never Used     Tobacco comment: quit 2019 - was smoking 3 cigarettes daily   Substance Use Topics     Alcohol use: No     Family History   Problem Relation Age of Onset     No Known Problems Father      No Known Problems Mother      No Known Problems Sister      No Known Problems Sister      No Known Problems Sister      No Known Problems Sister      No Known Problems Brother      No Known Problems Brother      Diabetes Other         couple aunts- maternal great aunts     Colon Cancer No family hx of      Breast Cancer No family hx of      Coronary Artery Disease No family hx of          Current Outpatient Medications   Medication Sig Dispense Refill     albuterol (PROAIR HFA/PROVENTIL HFA/VENTOLIN HFA) 108 (90 Base) MCG/ACT inhaler Inhale 2 puffs into the lungs every 4 hours as needed for shortness of breath / dyspnea or wheezing 18 g 0     fluticasone (FLOVENT HFA) 110 MCG/ACT inhaler Inhale 1 puff into the lungs 2 times daily 12 g 1     fluticasone (FLOVENT HFA) 220 MCG/ACT inhaler Inhale 1 puff into the lungs 2 times daily 12 g 1     clindamycin (CLEOCIN T) 1 % external lotion Apply once daily to face in the morning (Patient not taking: Reported on 7/13/2021) 60 mL 11     doxycycline monohydrate (MONODOX) 100 MG capsule Take 1 capsule (100 mg) by mouth 2 times daily (Patient not taking: Reported on 7/13/2021) 60 capsule 2     tretinoin (RETIN-A) 0.025 % external cream Apply a pea-sized amount evenly over the face at nighttime before bed. (Patient not taking: Reported on 7/13/2021) 45 g 11       Breast Cancer Screening:  Any new diagnosis of family breast, ovarian, or bowel cancer? No    FHS-7: No flowsheet data found.    Patient  under 40 years of age: Routine Mammogram Screening not recommended.   Pertinent mammograms are reviewed under the imaging tab.    History of abnormal Pap smear: NO - age 30-65 PAP every 5 years with negative HPV co-testing recommended  PAP / HPV Latest Ref Rng & Units 12/12/2019 10/31/2016   PAP - NIL NIL   HPV 16 DNA NEG:Negative Negative -   HPV 18 DNA NEG:Negative Negative -   OTHER HR HPV NEG:Negative Negative -     Reviewed and updated as needed this visit by clinical staff  Tobacco  Allergies  Meds   Med Hx  Surg Hx  Fam Hx  Soc Hx        Reviewed and updated as needed this visit by Provider  Tobacco   Meds   Med Hx  Surg Hx  Fam Hx  Soc Hx         Energy is simply low and concerned iron may be low.  Hard to jump start - drained all the time.  Occurring over the couple   Not sleeping well.  Has Autistic child 3 year old .  Sometimes can't fall asleep  Once I fall asleep will stay asleep.      ACT Total Scores 11/4/2019 2/28/2020 7/13/2021   ACT TOTAL SCORE (Goal Greater than or Equal to 20) 16 14 11   In the past 12 months, how many times did you visit the emergency room for your asthma without being admitted to the hospital? 0 0 1   In the past 12 months, how many times were you hospitalized overnight because of your asthma? 0 0 0     Asthma flaring and keep me up.   Using Albuterol inhaler more than once a day  Hospital last week and bad flare up- refilled flovent and  inhalers   flovent helping more than albouterol   Has never been hospitalized for asthma and never on oral prednisone     Muscle spasms and lot of times back hurting not taking anything for it.  Hard to take prescriptions.  Ibuprofen gives worse stomach ache ever  Tylenol or aspirin goodies-helpful sometimes    Bad constipation years - can go 3 days without bowel movement -heartburn last several weeks. Never scoped.  Never seen gastroenterologist - first time coming to doctor in awhile  tums help for a little while without  "improvement.  Regarding constipation- advised to get more fiber in diet and not helpful     Had tubal ligation and cramping more severe. - no menstrual cramps   Feels like uteurs is bending if sitting in certain position will have cramping  History of 3 csections       Has been with current partner 12 yrs on and off - doesn't use condoms- wants testing for sexually transmitted diseases    Review of Systems   Constitutional: Negative for chills and fever.   HENT: Negative for congestion, ear pain, hearing loss and sore throat.    Eyes: Negative for pain and visual disturbance.   Respiratory: Positive for shortness of breath. Negative for cough.    Cardiovascular: Positive for palpitations. Negative for chest pain and peripheral edema.   Gastrointestinal: Positive for constipation and heartburn. Negative for abdominal pain, diarrhea, hematochezia and nausea.   Genitourinary: Negative for dysuria, frequency, genital sores, hematuria and urgency.   Musculoskeletal: Positive for arthralgias and myalgias. Negative for joint swelling.   Skin: Negative for rash.   Neurological: Positive for weakness and headaches. Negative for dizziness and paresthesias.   Psychiatric/Behavioral: Positive for mood changes. The patient is not nervous/anxious.           OBJECTIVE:   /82   Pulse 83   Resp 12   Ht 1.676 m (5' 6\")   Wt 79.8 kg (175 lb 14.4 oz)   LMP 06/24/2021 (Approximate)   SpO2 97%   BMI 28.39 kg/m    Physical Exam  GENERAL: healthy, alert and no distress  EYES: Eyes grossly normal to inspection, PERRL and conjunctivae and sclerae normal  HENT: ear canals and TM's normal, nose and mouth without ulcers or lesions  NECK: no adenopathy, no asymmetry, masses, or scars and thyroid normal to palpation  RESP: lungs clear to auscultation - no rales, rhonchi or wheezes  BREAST: normal without masses, tenderness or nipple discharge and no palpable axillary masses or adenopathy  CV: regular rate and rhythm, normal S1 S2, no " S3 or S4, no murmur, click or rub, no peripheral edema and peripheral pulses strong  ABDOMEN: soft, nontender, no hepatosplenomegaly, no masses and bowel sounds normal   (female): normal female external genitalia, normal urethral meatus, vaginal mucosa pink, moist, well rugated, and normal cervix/adnexa/uterus without masses or discharge  MS: no gross musculoskeletal defects noted, no edema  SKIN: no suspicious lesions or rashes  NEURO: Normal strength and tone, mentation intact and speech normal  PSYCH: mentation appears normal, affect normal/bright    Diagnostic Test Results:  Results for orders placed or performed in visit on 07/13/21   CBC with platelets and differential     Status: None   Result Value Ref Range    WBC Count 7.5 4.0 - 11.0 10e3/uL    RBC Count 4.47 3.80 - 5.20 10e6/uL    Hemoglobin 12.6 11.7 - 15.7 g/dL    Hematocrit 38.8 35.0 - 47.0 %    MCV 87 78 - 100 fL    MCH 28.2 26.5 - 33.0 pg    MCHC 32.5 31.5 - 36.5 g/dL    RDW 12.6 10.0 - 15.0 %    Platelet Count 295 150 - 450 10e3/uL    % Neutrophils 65 %    % Lymphocytes 27 %    % Monocytes 6 %    % Eosinophils 2 %    % Basophils 0 %    Absolute Neutrophils 4.8 1.6 - 8.3 10e3/uL    Absolute Lymphocytes 2.0 0.8 - 5.3 10e3/uL    Absolute Monocytes 0.5 0.0 - 1.3 10e3/uL    Absolute Eosinophils 0.2 0.0 - 0.7 10e3/uL    Absolute Basophils 0.0 0.0 - 0.2 10e3/uL   CBC with platelets and differential     Status: None    Narrative    The following orders were created for panel order CBC with platelets and differential.  Procedure                               Abnormality         Status                     ---------                               -----------         ------                     CBC with platelets and d...[[id number]]                      Final result                 Please view results for these tests on the individual orders.       ASSESSMENT/PLAN:   1. Routine general medical examination at a health care facility        2. Screening for  hyperlipidemia  Lipids pending   - Lipid panel reflex to direct LDL Fasting; Future  - Lipid panel reflex to direct LDL Fasting    3. Screening for diabetes mellitus  Labs pending  - Comprehensive metabolic panel (BMP + Alb, Alk Phos, ALT, AST, Total. Bili, TP); Future  - Comprehensive metabolic panel (BMP + Alb, Alk Phos, ALT, AST, Total. Bili, TP)    4. Fatigue, unspecified type  Rule out anemia, thyroid disease, liver disease and/or renal disease     - CBC with platelets and differential; Future  - Comprehensive metabolic panel (BMP + Alb, Alk Phos, ALT, AST, Total. Bili, TP); Future  - TSH with free T4 reflex; Future  - TSH with free T4 reflex  - Comprehensive metabolic panel (BMP + Alb, Alk Phos, ALT, AST, Total. Bili, TP)  - CBC with platelets and differential    5. Screening for STDs (sexually transmitted diseases)    - HIV Antigen Antibody Combo; Future  - Hepatitis C antibody; Future  - Hepatitis B surface antigen; Future  - Hepatitis B core antibody; Future  - Treponema Abs w Reflex to RPR and Titer; Future  - NEISSERIA GONORRHOEA PCR; Future  - CHLAMYDIA TRACHOMATIS PCR; Future  - Treponema Abs w Reflex to RPR and Titer  - Hepatitis B core antibody  - Hepatitis B surface antigen  - Hepatitis C antibody  - HIV Antigen Antibody Combo  - NEISSERIA GONORRHOEA PCR  - CHLAMYDIA TRACHOMATIS PCR    7. Mild persistent asthma with acute exacerbation  Symptoms not controlled. Increase flovent from 110 to 220 and follow up with us in one month  - fluticasone (FLOVENT HFA) 220 MCG/ACT inhaler; Inhale 1 puff into the lungs 2 times daily  Dispense: 12 g; Refill: 1  - albuterol (PROAIR HFA/PROVENTIL HFA/VENTOLIN HFA) 108 (90 Base) MCG/ACT inhaler; Inhale 2 puffs into the lungs every 4 hours as needed for shortness of breath / dyspnea or wheezing  Dispense: 18 g; Refill: 0    9. Pelvic pain in female  Will obtain ultrasound. Normal exam - follow up with gyn - she believes related to her tubal.   - Ob/Gyn Referral;  "Future  - US Pelvic Complete with Transvaginal; Future    10. Constipation, unspecified constipation type  Rule out thyroid disease - have colonoscopy   - Adult Gastro Ref - Procedure Only; Future    11. Heartburn  Not improved with tums - schedule endoscopy  - Adult Gastro Ref - Procedure Only; Future    12. High priority for 2019-nCoV vaccine      13. Need for viral immunization      14. Screening for cervical cancer    - Pap imaged thin layer screen with HPV - recommended age 30 - 65 years (select HPV order below)  - HPV High Risk Types DNA Cervical    Patient has been advised of split billing requirements and indicates understanding: Yes  COUNSELING:  Reviewed preventive health counseling, as reflected in patient instructions       Regular exercise       Healthy diet/nutrition       Vision screening       Immunizations    Will return for covid vaccine            Safe sex practices/STD prevention    Estimated body mass index is 31.55 kg/m  as calculated from the following:    Height as of 2/28/20: 1.67 m (5' 5.75\").    Weight as of 2/28/20: 88 kg (194 lb).    Weight management plan: Discussed healthy diet and exercise guidelines    She reports that she has quit smoking. She has never used smokeless tobacco.      Counseling Resources:  ATP IV Guidelines  Pooled Cohorts Equation Calculator  Breast Cancer Risk Calculator  BRCA-Related Cancer Risk Assessment: FHS-7 Tool  FRAX Risk Assessment  ICSI Preventive Guidelines  Dietary Guidelines for Americans, 2010  USDA's MyPlate  ASA Prophylaxis  Lung CA Screening    Becky Ramirez PA-C  [facility]  "

## 2021-07-13 ENCOUNTER — OFFICE VISIT (OUTPATIENT)
Dept: FAMILY MEDICINE | Facility: CLINIC | Age: 31
End: 2021-07-13
Payer: COMMERCIAL

## 2021-07-13 VITALS
BODY MASS INDEX: 28.27 KG/M2 | HEIGHT: 66 IN | OXYGEN SATURATION: 97 % | RESPIRATION RATE: 12 BRPM | HEART RATE: 83 BPM | SYSTOLIC BLOOD PRESSURE: 112 MMHG | WEIGHT: 175.9 LBS | DIASTOLIC BLOOD PRESSURE: 82 MMHG

## 2021-07-13 DIAGNOSIS — R10.2 PELVIC PAIN IN FEMALE: ICD-10-CM

## 2021-07-13 DIAGNOSIS — Z11.3 SCREENING FOR STDS (SEXUALLY TRANSMITTED DISEASES): ICD-10-CM

## 2021-07-13 DIAGNOSIS — J45.31 MILD PERSISTENT ASTHMA WITH ACUTE EXACERBATION: ICD-10-CM

## 2021-07-13 DIAGNOSIS — R53.83 FATIGUE, UNSPECIFIED TYPE: ICD-10-CM

## 2021-07-13 DIAGNOSIS — K59.00 CONSTIPATION, UNSPECIFIED CONSTIPATION TYPE: ICD-10-CM

## 2021-07-13 DIAGNOSIS — Z23 NEED FOR VIRAL IMMUNIZATION: ICD-10-CM

## 2021-07-13 DIAGNOSIS — Z23 HIGH PRIORITY FOR 2019-NCOV VACCINE: ICD-10-CM

## 2021-07-13 DIAGNOSIS — Z12.4 SCREENING FOR CERVICAL CANCER: ICD-10-CM

## 2021-07-13 DIAGNOSIS — Z00.00 ROUTINE GENERAL MEDICAL EXAMINATION AT A HEALTH CARE FACILITY: Primary | ICD-10-CM

## 2021-07-13 DIAGNOSIS — Z13.1 SCREENING FOR DIABETES MELLITUS: ICD-10-CM

## 2021-07-13 DIAGNOSIS — R12 HEARTBURN: ICD-10-CM

## 2021-07-13 DIAGNOSIS — Z13.220 SCREENING FOR HYPERLIPIDEMIA: ICD-10-CM

## 2021-07-13 LAB
ALBUMIN SERPL-MCNC: 3.9 G/DL (ref 3.4–5)
ALP SERPL-CCNC: 49 U/L (ref 40–150)
ALT SERPL W P-5'-P-CCNC: 25 U/L
ANION GAP SERPL CALCULATED.3IONS-SCNC: 5 MMOL/L (ref 3–14)
AST SERPL W P-5'-P-CCNC: 13 U/L (ref 0–45)
BASOPHILS # BLD AUTO: 0 10E3/UL (ref 0–0.2)
BASOPHILS NFR BLD AUTO: 0 %
BILIRUB SERPL-MCNC: 0.4 MG/DL (ref 0.2–1.3)
BUN SERPL-MCNC: 12 MG/DL (ref 7–30)
CALCIUM SERPL-MCNC: 9.2 MG/DL (ref 8.5–10.1)
CHLORIDE BLD-SCNC: 108 MMOL/L
CHOLEST SERPL-MCNC: 183 MG/DL
CO2 SERPL-SCNC: 25 MMOL/L (ref 20–32)
CREAT SERPL-MCNC: 0.67 MG/DL
EOSINOPHIL # BLD AUTO: 0.2 10E3/UL (ref 0–0.7)
EOSINOPHIL NFR BLD AUTO: 2 %
ERYTHROCYTE [DISTWIDTH] IN BLOOD BY AUTOMATED COUNT: 12.6 % (ref 10–15)
FASTING STATUS PATIENT QL REPORTED: YES
GFR SERPL CREATININE-BSD FRML MDRD: >90 ML/MIN/1.73M2
GLUCOSE BLD-MCNC: 88 MG/DL (ref 70–99)
HBV CORE AB SERPL QL IA: NONREACTIVE
HBV SURFACE AG SERPL QL IA: NONREACTIVE
HCT VFR BLD AUTO: 38.8 % (ref 35–47)
HCV AB SERPL QL IA: NONREACTIVE
HDLC SERPL-MCNC: 76 MG/DL
HGB BLD-MCNC: 12.6 G/DL (ref 11.7–15.7)
HIV 1+2 AB+HIV1 P24 AG SERPL QL IA: NONREACTIVE
LDLC SERPL CALC-MCNC: 97 MG/DL
LYMPHOCYTES # BLD AUTO: 2 10E3/UL (ref 0.8–5.3)
LYMPHOCYTES NFR BLD AUTO: 27 %
MCH RBC QN AUTO: 28.2 PG (ref 26.5–33)
MCHC RBC AUTO-ENTMCNC: 32.5 G/DL (ref 31.5–36.5)
MCV RBC AUTO: 87 FL (ref 78–100)
MONOCYTES # BLD AUTO: 0.5 10E3/UL (ref 0–1.3)
MONOCYTES NFR BLD AUTO: 6 %
NEUTROPHILS # BLD AUTO: 4.8 10E3/UL (ref 1.6–8.3)
NEUTROPHILS NFR BLD AUTO: 65 %
NONHDLC SERPL-MCNC: 107 MG/DL
PLATELET # BLD AUTO: 295 10E3/UL (ref 150–450)
POTASSIUM BLD-SCNC: 4 MMOL/L (ref 3.4–5.3)
PROT SERPL-MCNC: 8.1 G/DL (ref 6.8–8.8)
RBC # BLD AUTO: 4.47 10E6/UL (ref 3.8–5.2)
SODIUM SERPL-SCNC: 138 MMOL/L (ref 133–144)
T PALLIDUM AB SER QL: NONREACTIVE
TRIGL SERPL-MCNC: 48 MG/DL
TSH SERPL DL<=0.005 MIU/L-ACNC: 0.74 MU/L (ref 0.4–4)
WBC # BLD AUTO: 7.5 10E3/UL (ref 4–11)

## 2021-07-13 PROCEDURE — G0145 SCR C/V CYTO,THINLAYER,RESCR: HCPCS | Performed by: PHYSICIAN ASSISTANT

## 2021-07-13 PROCEDURE — 86780 TREPONEMA PALLIDUM: CPT | Performed by: PHYSICIAN ASSISTANT

## 2021-07-13 PROCEDURE — 99213 OFFICE O/P EST LOW 20 MIN: CPT | Mod: 25 | Performed by: PHYSICIAN ASSISTANT

## 2021-07-13 PROCEDURE — 86803 HEPATITIS C AB TEST: CPT | Performed by: PHYSICIAN ASSISTANT

## 2021-07-13 PROCEDURE — 87491 CHLMYD TRACH DNA AMP PROBE: CPT | Performed by: PHYSICIAN ASSISTANT

## 2021-07-13 PROCEDURE — 99395 PREV VISIT EST AGE 18-39: CPT | Performed by: PHYSICIAN ASSISTANT

## 2021-07-13 PROCEDURE — 36415 COLL VENOUS BLD VENIPUNCTURE: CPT | Performed by: PHYSICIAN ASSISTANT

## 2021-07-13 PROCEDURE — 87340 HEPATITIS B SURFACE AG IA: CPT | Performed by: PHYSICIAN ASSISTANT

## 2021-07-13 PROCEDURE — 87624 HPV HI-RISK TYP POOLED RSLT: CPT | Performed by: PHYSICIAN ASSISTANT

## 2021-07-13 PROCEDURE — 87389 HIV-1 AG W/HIV-1&-2 AB AG IA: CPT | Performed by: PHYSICIAN ASSISTANT

## 2021-07-13 PROCEDURE — 80050 GENERAL HEALTH PANEL: CPT | Performed by: PHYSICIAN ASSISTANT

## 2021-07-13 PROCEDURE — 87591 N.GONORRHOEAE DNA AMP PROB: CPT | Performed by: PHYSICIAN ASSISTANT

## 2021-07-13 PROCEDURE — 86704 HEP B CORE ANTIBODY TOTAL: CPT | Performed by: PHYSICIAN ASSISTANT

## 2021-07-13 PROCEDURE — 80061 LIPID PANEL: CPT | Performed by: PHYSICIAN ASSISTANT

## 2021-07-13 RX ORDER — FLUTICASONE PROPIONATE 220 UG/1
1 AEROSOL, METERED RESPIRATORY (INHALATION) 2 TIMES DAILY
Qty: 12 G | Refills: 1 | Status: SHIPPED | OUTPATIENT
Start: 2021-07-13 | End: 2022-05-05

## 2021-07-13 RX ORDER — CX-024414 0.2 MG/ML
0.5 INJECTION, SUSPENSION INTRAMUSCULAR
Qty: 0.5 ML | Refills: 0 | Status: SHIPPED | OUTPATIENT
Start: 2021-07-13 | End: 2021-07-13

## 2021-07-13 RX ORDER — ALBUTEROL SULFATE 90 UG/1
2 AEROSOL, METERED RESPIRATORY (INHALATION) EVERY 4 HOURS PRN
Qty: 18 G | Refills: 0 | Status: SHIPPED | OUTPATIENT
Start: 2021-07-13

## 2021-07-13 RX ORDER — CX-024414 0.2 MG/ML
0.5 INJECTION, SUSPENSION INTRAMUSCULAR
Status: CANCELLED | OUTPATIENT
Start: 2021-07-13

## 2021-07-13 ASSESSMENT — ENCOUNTER SYMPTOMS
DIARRHEA: 0
SORE THROAT: 0
HEARTBURN: 1
DYSURIA: 0
SHORTNESS OF BREATH: 1
COUGH: 0
DIZZINESS: 0
NERVOUS/ANXIOUS: 0
HEADACHES: 1
FREQUENCY: 0
EYE PAIN: 0
PALPITATIONS: 1
FEVER: 0
ARTHRALGIAS: 1
ABDOMINAL PAIN: 0
JOINT SWELLING: 0
WEAKNESS: 1
PARESTHESIAS: 0
CONSTIPATION: 1
CHILLS: 0
HEMATOCHEZIA: 0
NAUSEA: 0
MYALGIAS: 1
HEMATURIA: 0

## 2021-07-13 ASSESSMENT — MIFFLIN-ST. JEOR: SCORE: 1519.63

## 2021-07-14 LAB
C TRACH DNA SPEC QL NAA+PROBE: NEGATIVE
N GONORRHOEA DNA SPEC QL NAA+PROBE: NEGATIVE

## 2021-07-14 ASSESSMENT — ASTHMA QUESTIONNAIRES: ACT_TOTALSCORE: 11

## 2021-07-14 NOTE — RESULT ENCOUNTER NOTE
Dear Peewee  Your gonorrhea and chlamydia tests were negative.   Please call or MyChart my office with any questions or concerns.    Becky Ramirez, PAC

## 2021-07-14 NOTE — RESULT ENCOUNTER NOTE
Dear Peewee  Your hepatitis B, hepatitis C, HIV, syphilis, thyroid function, comprehensive metabolic panel ( electrolytes, blood sugar, kidney and liver function) , cholesterol, and Your blood counts and hemoglobin were normal.    Your gonorrhea, chlamydia and pap tests are still pending.   It does not look like you have immunity to hepatitis B.  I recommend the 3 shot series.  The second shot one month after the first and the 3rd 6 months later- please schedule MA (MEDICAL ASSISTANT) visits for these.  I do recommend getting the covid vaccine first and start the hepatitis B vaccines one month after the covid vaccines are completed.   Please call or MyChart my office with any questions or concerns.    Becky Ramirez, PAC

## 2021-07-15 LAB
BKR LAB AP GYN ADEQUACY: NORMAL
BKR LAB AP GYN INTERPRETATION: NORMAL
BKR LAB AP HPV REFLEX: NORMAL
BKR LAB AP LMP: NORMAL
BKR LAB AP PREVIOUS ABNORMAL: NORMAL
PATH REPORT.COMMENTS IMP SPEC: NORMAL
PATH REPORT.RELEVANT HX SPEC: NORMAL

## 2021-07-20 ENCOUNTER — TELEPHONE (OUTPATIENT)
Dept: GASTROENTEROLOGY | Facility: CLINIC | Age: 31
End: 2021-07-20

## 2021-07-20 LAB
HUMAN PAPILLOMA VIRUS 16 DNA: NEGATIVE
HUMAN PAPILLOMA VIRUS 18 DNA: NEGATIVE
HUMAN PAPILLOMA VIRUS FINAL DIAGNOSIS: NORMAL
HUMAN PAPILLOMA VIRUS OTHER HR: NEGATIVE

## 2021-07-20 NOTE — PROGRESS NOTES
Screening Questions  1. Are you active on mychart? Yes    2. What insurance is in the chart? [employer]    2.  Ordering/Referring Provider: Becky Ramirez PA-C     3. BMI 29.1    4. Are you on daily home oxygen? No    5. Do you have a history of difficult airway? No    6. Have you had a heart, lung, or liver transplant? No    7. Are you currently on dialysis? No    8. Have you had a stroke or Transient ischemic atttack (TIA) within 6 months? No    9. In the past 6 months, have you had any heart related issues including cardiomyopathy or heart attack?         If yes, did it require cardiac stenting or other implantable device?No    10. Do you have any implantable devices in your body (pacemaker, defib, LVAD)? No    11. Do you take nitroglycerin? If yes, how often? No    12. Are you currently taking any blood thinners?No    13. Are you a diabetic? No    14. (Females) Are you currently pregnant? No  If yes, how many weeks?    15. Have you had a procedure in the past that was difficult to tolerate with conscious sedation? Any allergies to Fentanyl or Versed No    16. Are you taking any scheduled prescription narcotics more than once daily? No    17. Do you have any chemical dependencies such as alcohol, street drugs, or methadone? No    18. Do you have any history of post-traumatic stress syndrome or mental health issues? No    19. Do you transfer independently? Yes    20.  Do you have any issues with constipation? Intermittent    21. Preferred Pharmacy for Pre Prescription [id number] [address] [place] [address] AT [facility]    Scheduling Details    Colonoscopy Prep Sent?:   Procedure Scheduled: Colonoscopy & EGD  Provider/Surgeon: Dr. Leventhal  Date of Procedure: 8/25/2021  Location: [address] [place] [address]  Caller (Please ask for phone number if not scheduled by patient): Patient      Sedation Type: CS  Conscious Sedation- Needs  for 6 hours after the  procedure  MAC/General-Needs  for 24 hours after procedure    Pre-op Required at [facility], [place], [place] and OR for MAC sedation:   (if yes advise patient they will need a pre-op prior to procedure)      Is patient on blood thinners? -No (If yes- inform patient to follow up with PCP or provider for follow up instructions)     Informed patient they will need an adult  Yes  Cannot take any type of public or medical transportation alone    Informed Patient of COVID Test Requirement Yes    Confirmed Nurse will call to complete assessment Yes    Additional comments: N/a

## 2021-07-22 ENCOUNTER — TELEPHONE (OUTPATIENT)
Dept: GASTROENTEROLOGY | Facility: CLINIC | Age: 31
End: 2021-07-22

## 2021-07-22 NOTE — PROGRESS NOTES
Left vml for Pt to call back and schedule 9/1 with Leventhal at [facility] for colonoscopy & EGD as original 8/25 was double booked. Pt will need covid test rescheduled as well. Please transfer patient to my line to reschedule. [phone]

## 2021-08-06 ENCOUNTER — ANCILLARY PROCEDURE (OUTPATIENT)
Dept: ULTRASOUND IMAGING | Facility: CLINIC | Age: 31
End: 2021-08-06
Attending: PHYSICIAN ASSISTANT
Payer: COMMERCIAL

## 2021-08-06 DIAGNOSIS — R10.2 PELVIC PAIN IN FEMALE: ICD-10-CM

## 2021-08-06 PROCEDURE — 76856 US EXAM PELVIC COMPLETE: CPT | Mod: 59

## 2021-08-06 PROCEDURE — 76830 TRANSVAGINAL US NON-OB: CPT

## 2021-08-06 NOTE — RESULT ENCOUNTER NOTE
Dear Peewee  You have a cyst on your right ovary.  I see that you have an upcoming appointment with the gynecologists.   Please call or MyChart my office with any questions or concerns.    Becky Ramirez, PAC

## 2021-08-06 NOTE — LETTER
August 9, 2021      ePewee Wilson  [address]  [facility] [place] [facility] [address]        Dear Peewee   You have a cyst on your right ovary.   I see that you have an upcoming appointment with the gynecologists.   Please call or MyChart my office with any questions or concerns.     Becky Ramirez, PAC       Resulted Orders   US Pelvic Complete with Transvaginal    Narrative    EXAMINATION: US PELVIC COMPLETE WITH TRANSVAGINAL, 8/6/2021 9:23 AM     COMPARISON: Pelvic ultrasound 1/15/2018    HISTORY: Pelvic pain in female    TECHNIQUE: The pelvis was scanned in standard fashion with  transabdominal and transvaginal transducer(s) using both grey scale  and color Doppler techniques.    FINDINGS  The uterus measures 8.7 x 7.0 x 5.5 cm, and there is no evidence of a  focal fibroid.  The endometrium is within normal limits and measures  16 mm. There is a small amount of free fluid in the pelvis.    The right ovary measures 4.8. 3.9 x 2.7 cm and the left ovary is not  visualized. Varicosities noted in the left adnexa. The right ovary  shows a 2.0 x 1.4 x 2.3 cm cystic structure and an additional 1.9 x  1.5 x 1.0 cm thick-walled cystic structure suggesting a corpus luteum.  There is normal blood flow to the right ovaries.        Impression    IMPRESSION:   1.  Right ovary shows a 2 cm cyst versus follicle and a slightly  smaller corpus luteum.  2.  The left ovary is not visualized.  3.  A small amount of free fluid in the pelvis is nonspecific.    I have personally reviewed the examination and initial interpretation  and I agree with the findings.    FIDEL KRUASE MD         SYSTEM ID:  [id number]

## 2021-08-18 ENCOUNTER — DOCUMENTATION ONLY (OUTPATIENT)
Dept: LAB | Facility: CLINIC | Age: 31
End: 2021-08-18

## 2021-08-19 ENCOUNTER — OFFICE VISIT (OUTPATIENT)
Dept: OBGYN | Facility: CLINIC | Age: 31
End: 2021-08-19
Attending: PHYSICIAN ASSISTANT
Payer: COMMERCIAL

## 2021-08-19 VITALS
BODY MASS INDEX: 28.57 KG/M2 | HEART RATE: 67 BPM | SYSTOLIC BLOOD PRESSURE: 124 MMHG | OXYGEN SATURATION: 99 % | WEIGHT: 177 LBS | DIASTOLIC BLOOD PRESSURE: 84 MMHG

## 2021-08-19 DIAGNOSIS — R10.2 PELVIC PAIN IN FEMALE: ICD-10-CM

## 2021-08-19 DIAGNOSIS — I86.2 VARICOSITIES OF PELVIS: Primary | ICD-10-CM

## 2021-08-19 PROCEDURE — 99213 OFFICE O/P EST LOW 20 MIN: CPT | Performed by: OBSTETRICS & GYNECOLOGY

## 2021-08-19 ASSESSMENT — PAIN SCALES - GENERAL: PAINLEVEL: MODERATE PAIN (5)

## 2021-08-19 NOTE — PATIENT INSTRUCTIONS
If you have any questions regarding your visit, Please contact your care team.    [facility]: [phone]      [facility] CLINIC HOURS TELEPHONE NUMBER   Diana Silvestre DO.    [place]illa -  [place] -     KATHY Flower RN     Monday, Wednesday, Thursday and [facility]  8:30a.m-5:00 p.m   [facility]  [address] [place] [address]  [phone] ask for [facility]    Imaging [phone]       Urgent Care locations:    [facility] Saturday and Sunday   9 am - 5 pm    Monday-Friday   11 pm - 7 pm  Saturday and Sunday   9 am - 5 pm   [phone]     [facility] Labor and Delivery:  [phone]    If you need a medication refill, please contact your pharmacy. Please allow 3 business days for your refill to be completed.  As always, Thank you for trusting us with your healthcare needs!

## 2021-08-19 NOTE — PROGRESS NOTES
This 32 y/o female, , s/p tubal ligation, LMP 2021, presents c/o chronic pelvic pain issues.  She had a recent pelvic US performed on 2021 and this demonstrated a normal 16 mm endometrial stripe but left adnexal varicosities along with free pelvic fluid.  She had been taking Depo Provera in the past but discontinued these injections in 2018.    /84 (BP Location: Right arm, Patient Position: Chair, Cuff Size: Adult Regular)   Pulse 67   Wt 80.3 kg (177 lb)   LMP 2021 (Exact Date)   SpO2 99%   Breastfeeding No   BMI 28.57 kg/m    ROS:  10 systems were reviewed and the positives were listed under problems.  PE:  The patient declined to undress today.  General- Healthy-appearing female in no acute distress  Eyes- No scleral injection or icterus  ENT- Mucus membranes moist  CV- No noticeable edema in extremities  Lymph- No noticeable cervical adenopathy  Respiratory- Non-labored breathing  Skin- No suspicious lesions or rashes visualized  Psych- Normal affect  Assessment - Chronic pelvic pain, left adnexal varicosities, free pelvic fluid per US  Plan - We discussed her treatment options but she does not want to restart receiving Depo Provera injections.  Instead, she prefers to discuss her treatment options for varicosities with an Interventional Radiologist.  She is hoping to avoid major surgery if possible and is not interested in future childbearing since she is s/p tubal ligation.  20 minutes were spent today in chart review, the patient visit, review of tests, and documentation in regards to the issues noted above.

## 2021-08-30 ENCOUNTER — OFFICE VISIT (OUTPATIENT)
Dept: DERMATOLOGY | Facility: CLINIC | Age: 31
End: 2021-08-30
Payer: COMMERCIAL

## 2021-08-30 DIAGNOSIS — L70.0 ACNE VULGARIS: Primary | ICD-10-CM

## 2021-08-30 DIAGNOSIS — D48.5 NEOPLASM OF UNCERTAIN BEHAVIOR OF SKIN: ICD-10-CM

## 2021-08-30 PROCEDURE — 99214 OFFICE O/P EST MOD 30 MIN: CPT | Mod: 25 | Performed by: DERMATOLOGY

## 2021-08-30 PROCEDURE — 11102 TANGNTL BX SKIN SINGLE LES: CPT | Performed by: DERMATOLOGY

## 2021-08-30 PROCEDURE — 88305 TISSUE EXAM BY PATHOLOGIST: CPT | Performed by: DERMATOLOGY

## 2021-08-30 RX ORDER — ADAPALENE GEL USP, 0.3% 3 MG/G
GEL TOPICAL
Qty: 45 G | Refills: 11 | Status: SHIPPED | OUTPATIENT
Start: 2021-08-30 | End: 2022-08-24

## 2021-08-30 RX ORDER — SPIRONOLACTONE 50 MG/1
50 TABLET, FILM COATED ORAL DAILY
Qty: 30 TABLET | Refills: 2 | Status: SHIPPED | OUTPATIENT
Start: 2021-08-30 | End: 2021-08-30

## 2021-08-30 RX ORDER — SPIRONOLACTONE 50 MG/1
50 TABLET, FILM COATED ORAL DAILY
Qty: 30 TABLET | Refills: 2 | Status: SHIPPED | OUTPATIENT
Start: 2021-08-30 | End: 2022-08-24

## 2021-08-30 NOTE — LETTER
8/30/2021         RE: Peewee Wilson  [address]  [facility] [address]        Dear Colleague,    Thank you for referring your patient, Peewee Wilson, to the [facility]. Please see a copy of my visit note below.    [facility] Dermatology Note  Encounter Date: Aug 30, 2021  Office Visit     Dermatology Problem List:  0. NUB - left neck, bx 8/230/21  1. Acne vulgaris  - current tx: spironolactone, differin 0.3% gel  - previous tx: doxycycline 100 mg PO BID, tretinoin 0.025% cream, clindamycin 1% lotion, BP 4-5% wash  ____________________________________________    Assessment & Plan:    # Acne vulgaris, moderate to severe papulopustular / nodulocystic acne with scarring. Flares with menstrual cycle. Chronic, flare.   - s/p doxycycline and topicals.  - Discussed treatment option of spironolactone. Patient aware she should not get pregnant while on this medication (patient has had a tubal ligation). Discussed side effects including breast tenderness, dizziness with standing, headaches, muscle cramps, spotting, tetragenic effects.   - Increase to differin 0.3% gel.  - Continue cetaphil gentle moisturizer.  - Future considerations: isotretinoin, chemical peels.    # Neoplasm of uncertain behavior on the left neck. The differential diagnosis includes nevus r/o atypia.  - See procedure note below.    Procedures Performed:   None.    Follow-up: pending path results, 1 year in-person, or earlier for new or changing lesions    Staff and Scribe:     Scribe Disclosure:   I, Kiesha Matthew, am serving as a scribe to document services personally performed by this physician, Dr. Edinsno Catalan, based on data collection and the provider's statements to me.     Provider Disclosure:   The documentation recorded by the scribe accurately reflects the services I personally performed and the decisions made by me.    Edinson Catalan MD    Department of  Dermatology  [facility]: Phone: [phone], Fax:[phone]  [facility]: Phone: [phone] Fax: [phone]  ____________________________________________    CC: Derm Problem (Acne f/u and look at spot on left neck. Using Differin gel & dark spot serum, Cetaphil face wash, No personal of family hx of SC. )    HPI:  Ms. Peewee Wilson is a(n) 31 year old female who presents today as a return patient for acne and spot check.    Last seen 10/13/2020 by a virtual visit for acne. At that time, patient was to start BPO wash, clindamycin lotion, tretinoin cream, and doxy 100mg BID x 3 months.    Today patient notes no change in acne since previous visit. Believes the doxy slowed it down, but did not help. Is currently using cetaphil wash, differin gel, and a dark spot serum. Flares with menstrual cycle. 2-3 deep lesions that flare at a time. Only present on the face, not on chest or back. Was on depo in the past. Has tried OCP, no change in acne during that time.    Patient is otherwise feeling well, without additional skin concerns.    Labs Reviewed:  N/A    Physical Exam:  Vitals: LMP 08/14/2021 (Exact Date)   SKIN: Focused examination of the face and neck was performed.  - Numerous acneiform papules and few nodule cystic lesions on the cheeks, jaw line, and chin.  - There are admixed hyperpigmented macules, consistent with scarring.  - Left neck: 6-7mm smooth slightly hyperpigmented exophytic papule  - No other lesions of concern on areas examined.     Medications:  Current Outpatient Medications   Medication     albuterol (PROAIR HFA/PROVENTIL HFA/VENTOLIN HFA) 108 (90 Base) MCG/ACT inhaler     fluticasone (FLOVENT HFA) 220 MCG/ACT inhaler     clindamycin (CLEOCIN T) 1 % external lotion     doxycycline monohydrate (MONODOX) 100 MG capsule     fluticasone (FLOVENT HFA) 110 MCG/ACT inhaler     tretinoin  (RETIN-A) 0.025 % external cream     No current facility-administered medications for this visit.      Past Medical History:   Patient Active Problem List   Diagnosis     Exercise-induced asthma     Mild persistent asthma with acute exacerbation     Past Medical History:   Diagnosis Date     Asthma      Cervical cancer screening     10/7/09 NIL pap, + HR HPV (not 16 or 18)  9/1/10 NIL Pap, Neg HPV 11 NIL pap 7/3/12 ASCUS pap, neg HPV 3/9/16 NIL Pap, Neg HPV. 19 NIL Pap, Neg HPV. Plan: cotest in 5 yr     History of  delivery, currently pregnant 2018     Migraines        Again, thank you for allowing me to participate in the care of your patient.        Sincerely,        Edinson Catalan MD

## 2021-08-30 NOTE — NURSING NOTE
Peewee Wilson's goals for this visit include:   Chief Complaint   Patient presents with     Derm Problem     Acne f/u and look at spot on left neck. Using Differin gel & dark spot serum, Cetaphil face wash, No personal of family hx of SC.        She requests these members of her care team be copied on today's visit information:     PCP: [facility]    Referring Provider:  No referring provider defined for this encounter.    LMP 08/14/2021 (Exact Date)     Do you need any medication refills at today's visit? katiuska [place] on 8/30/2021 at 9:10 AM

## 2021-08-30 NOTE — PATIENT INSTRUCTIONS

## 2021-08-30 NOTE — PROGRESS NOTES
[facility] Dermatology Note  Encounter Date: Aug 30, 2021  Office Visit     Dermatology Problem List:  0. NUB - left neck, bx 8/230/21  1. Acne vulgaris  - current tx: spironolactone, differin 0.3% gel  - previous tx: doxycycline 100 mg PO BID, tretinoin 0.025% cream, clindamycin 1% lotion, BP 4-5% wash  ____________________________________________    Assessment & Plan:    # Acne vulgaris, moderate to severe papulopustular / nodulocystic acne with scarring. Flares with menstrual cycle. Chronic, flare.   - s/p doxycycline and topicals.  - Discussed treatment option of spironolactone. Patient aware she should not get pregnant while on this medication (patient has had a tubal ligation). Discussed side effects including breast tenderness, dizziness with standing, headaches, muscle cramps, spotting, tetragenic effects.   - Increase to differin 0.3% gel.  - Continue cetaphil gentle moisturizer.  - Future considerations: isotretinoin, chemical peels.    # Neoplasm of uncertain behavior on the left neck. The differential diagnosis includes nevus r/o atypia.  - See procedure note below.    Procedures Performed:   Shave biopsy: Location(s) left neck.  After discussion of benefits and risks including but not limited to bleeding/bruising, pain/swelling, infection, scar, incomplete removal, nerve damage/numbness, recurrence, and non-diagnostic biopsy, written consent, verbal consent and photographs were obtained. Time-out was performed. The area was cleaned with isopropyl alcohol. 0.5mL of 1% lidocaine with epinephrine was injected to obtain adequate anesthesia of each lesion. Shave biopsy was performed. Hemostasis was achieved with aluminium chloride. Vaseline and a sterile dressing were applied. The patient tolerated the procedure and no complications were noted. The patient was provided with verbal and written post care instructions.     Follow-up: pending path results, 1 year in-person, or earlier for new  or changing lesions    Staff and Scribe:     Scribe Disclosure:   I, Kiesha Matthew, am serving as a scribe to document services personally performed by this physician, Dr. Edinson Catalan, based on data collection and the provider's statements to me.     Provider Disclosure:   The documentation recorded by the scribe accurately reflects the services I personally performed and the decisions made by me.    Edinson Catalan MD    Department of Dermatology  [facility]: Phone: [phone], Fax:[phone]  [facility]: Phone: [phone] Fax: [phone]  ____________________________________________    CC: Derm Problem (Acne f/u and look at spot on left neck. Using Differin gel & dark spot serum, Cetaphil face wash, No personal of family hx of SC. )    HPI:  Ms. Peewee Wilson is a(n) 31 year old female who presents today as a return patient for acne and spot check.    Last seen 10/13/2020 by a virtual visit for acne. At that time, patient was to start BPO wash, clindamycin lotion, tretinoin cream, and doxy 100mg BID x 3 months.    Today patient notes no change in acne since previous visit. Believes the doxy slowed it down, but did not help. Is currently using cetaphil wash, differin gel, and a dark spot serum. Flares with menstrual cycle. 2-3 deep lesions that flare at a time. Only present on the face, not on chest or back. Was on depo in the past. Has tried OCP, no change in acne during that time.    Patient is otherwise feeling well, without additional skin concerns.    Labs Reviewed:  N/A    Physical Exam:  Vitals: LMP 08/14/2021 (Exact Date)   SKIN: Focused examination of the face and neck was performed.  - Numerous acneiform papules and few nodule cystic lesions on the cheeks, jaw line, and chin.  - There are admixed hyperpigmented macules, consistent with scarring.  - Left neck: 6-7mm  smooth slightly hyperpigmented exophytic papule  - No other lesions of concern on areas examined.     Medications:  Current Outpatient Medications   Medication     albuterol (PROAIR HFA/PROVENTIL HFA/VENTOLIN HFA) 108 (90 Base) MCG/ACT inhaler     fluticasone (FLOVENT HFA) 220 MCG/ACT inhaler     clindamycin (CLEOCIN T) 1 % external lotion     doxycycline monohydrate (MONODOX) 100 MG capsule     fluticasone (FLOVENT HFA) 110 MCG/ACT inhaler     tretinoin (RETIN-A) 0.025 % external cream     No current facility-administered medications for this visit.      Past Medical History:   Patient Active Problem List   Diagnosis     Exercise-induced asthma     Mild persistent asthma with acute exacerbation     Past Medical History:   Diagnosis Date     Asthma      Cervical cancer screening     10/7/09 NIL pap, + HR HPV (not 16 or 18)  9/1/10 NIL Pap, Neg HPV 11 NIL pap 7/3/12 ASCUS pap, neg HPV 3/9/16 NIL Pap, Neg HPV. 19 NIL Pap, Neg HPV. Plan: cotest in 5 yr     History of  delivery, currently pregnant 2018     Migraines

## 2021-08-30 NOTE — NURSING NOTE
The following medication was given:     MEDICATION:  Lidocaine with epinephrine 1% 1:[id number]  ROUTE: SQ  SITE: see procedure note  DOSE: 1 cc  LOT #: [id number]  : Manuel  EXPIRATION DATE: 6/1/22  NDC#: 0407-5369-37  Was there drug waste? 1cc  Multi-dose vial: Yes  Noemí [place] on 8/30/2021 at 10:15 AM

## 2021-09-20 LAB
PATH REPORT.COMMENTS IMP SPEC: NORMAL
PATH REPORT.COMMENTS IMP SPEC: NORMAL
PATH REPORT.FINAL DX SPEC: NORMAL
PATH REPORT.GROSS SPEC: NORMAL
PATH REPORT.MICROSCOPIC SPEC OTHER STN: NORMAL
PATH REPORT.RELEVANT HX SPEC: NORMAL

## 2021-10-04 ENCOUNTER — HEALTH MAINTENANCE LETTER (OUTPATIENT)
Age: 31
End: 2021-10-04

## 2022-03-24 ENCOUNTER — HOSPITAL ENCOUNTER (OUTPATIENT)
Facility: CLINIC | Age: 32
End: 2022-03-24
Attending: INTERNAL MEDICINE | Admitting: INTERNAL MEDICINE
Payer: COMMERCIAL

## 2022-03-24 ENCOUNTER — TELEPHONE (OUTPATIENT)
Dept: GASTROENTEROLOGY | Facility: CLINIC | Age: 32
End: 2022-03-24
Payer: COMMERCIAL

## 2022-03-24 DIAGNOSIS — Z11.59 ENCOUNTER FOR SCREENING FOR OTHER VIRAL DISEASES: Primary | ICD-10-CM

## 2022-03-24 NOTE — TELEPHONE ENCOUNTER
Screening Questions  1. Are you active on mychart? Yes     2. What insurance is in the chart? [employer]     2.  Ordering/Referring Provider: Becky Ramirez PA-C      3. BMI 29.1     4. Are you on daily home oxygen? No     5. Do you have a history of difficult airway? No     6. Have you had a heart, lung, or liver transplant? No     7. Are you currently on dialysis? No     8. Have you had a stroke or Transient ischemic atttack (TIA) within 6 months? No     9. In the past 6 months, have you had any heart related issues including cardiomyopathy or heart attack?         If yes, did it require cardiac stenting or other implantable device?No     10. Do you have any implantable devices in your body (pacemaker, defib, LVAD)? No     11. Do you take nitroglycerin? If yes, how often? No     12. Are you currently taking any blood thinners?No     13. Are you a diabetic? No     14. (Females) Are you currently pregnant? No  If yes, how many weeks?     15. Have you had a procedure in the past that was difficult to tolerate with conscious sedation? Any allergies to Fentanyl or Versed No     16. Are you taking any scheduled prescription narcotics more than once daily? No     17. Do you have any chemical dependencies such as alcohol, street drugs, or methadone? No     18. Do you have any history of post-traumatic stress syndrome or mental health issues? No     19. Do you transfer independently? Yes     20.  Do you have any issues with constipation? Intermittent     21. Preferred Pharmacy for Pre Prescription [facility] #[id number] [address] [place] [address] AT [facility] & [facility]     Scheduling Details     Colonoscopy Prep Sent?:   Procedure Scheduled: Colonoscopy & EGD  Provider/Surgeon: Dr. Leventhal  Date of Procedure: 4-25  Location: [address] [place] [address]  Caller (Please ask for phone number if not scheduled by patient): Patient        Sedation Type: CS  Conscious Sedation- Needs  for 6 hours after the  procedure  MAC/General-Needs  for 24 hours after procedure     Pre-op Required at [facility], [place], [place] and OR for MAC sedation:   (if yes advise patient they will need a pre-op prior to procedure)      Is patient on blood thinners? -No (If yes- inform patient to follow up with PCP or provider for follow up instructions)      Informed patient they will need an adult  Yes  Cannot take any type of public or medical transportation alone     Informed Patient of COVID Test Requirement Yes - MG lab 4-21     Confirmed Nurse will call to complete assessment Yes     Additional comments: N/a

## 2022-04-18 ENCOUNTER — TELEPHONE (OUTPATIENT)
Dept: GASTROENTEROLOGY | Facility: CLINIC | Age: 32
End: 2022-04-18

## 2022-04-18 DIAGNOSIS — K59.00 CONSTIPATION: Primary | ICD-10-CM

## 2022-04-18 RX ORDER — BISACODYL 5 MG/1
TABLET, DELAYED RELEASE ORAL
Qty: 2 TABLET | Refills: 0 | Status: SHIPPED | OUTPATIENT
Start: 2022-04-18 | End: 2022-04-18

## 2022-04-18 RX ORDER — BISACODYL 5 MG/1
TABLET, DELAYED RELEASE ORAL
Qty: 2 TABLET | Refills: 0 | Status: SHIPPED | OUTPATIENT
Start: 2022-04-18 | End: 2022-08-24

## 2022-04-18 NOTE — TELEPHONE ENCOUNTER
Staff message that patient returned call.     Pre assessment questions completed for upcoming colonoscopy/EGD procedure scheduled on 4/25/22    COVID test scheduled 4/21/22    Reviewed procedural arrival time 0645 and facility location [facility].    Designated  policy reviewed. Instructed to have someone stay 6 hours post procedure.     Reviewed Extended prep instructions with patient. No fiber/iron supplements or foods that contain nuts/seeds.    Patient verbalized understanding and had no questions or concerns at this time.    Taryn Zee RN

## 2022-04-18 NOTE — TELEPHONE ENCOUNTER
Attempted to contact patient regarding upcoming EGD/Colonoscopy procedure on 4/25/22 for pre assessment questions.  No answer.  Left message to return call to [phone] #2    Patient scheduled for EGD/Colonoscopy on 4/25/22.     Covid test scheduled: 4/21/22    Arrival time: 6:45am    Facility location: [facility]    Sedation type: CS    Indication for procedure: GERD and Constipation     Anticoagulations? None     Bowel prep recommendation: Constipation    Extended prep sent to [facility] #[id number] [address] AT [facility] [facility] pharmacy (I did have to call and give a verbal order for this prescription as I received 3 transmission to pharmacy errors in a row for the extended prep.) Prep instructions sent via [employer]     Pre visit planning completed.    Sade Johns RN

## 2022-04-19 ENCOUNTER — TELEPHONE (OUTPATIENT)
Dept: GASTROENTEROLOGY | Facility: CLINIC | Age: 32
End: 2022-04-19
Payer: COMMERCIAL

## 2022-04-19 NOTE — TELEPHONE ENCOUNTER
Caller: Peewee    Procedure: Colon/EGD    Date, Location, and Surgeon of Procedure Cancelled: 4-25/ [facility]/ Leventhal    Ordering Provider:    Reason for cancel (please be detailed, any staff messages or encounters to note?): Insurance will not cover EGD because pt has been taking otc meds and insurance stated pt needs to show proof she is taking prescribed meds for 6 weeks in order for them to cover procedure        Rescheduled: N

## 2022-04-27 ENCOUNTER — TELEPHONE (OUTPATIENT)
Dept: FAMILY MEDICINE | Facility: CLINIC | Age: 32
End: 2022-04-27
Payer: COMMERCIAL

## 2022-04-27 RX ORDER — FAMOTIDINE 10 MG
10 TABLET ORAL 2 TIMES DAILY
COMMUNITY
End: 2023-02-22

## 2022-04-27 NOTE — TELEPHONE ENCOUNTER
"  Patient called stating that her endoscopy and colonoscopy were denied by her insurance. She contacted them and states that their reason for the denial is because she does not have any prescriptions indicating the diagnosis of \"heartburn\". Patient states that she has been taking OTC Pepcid for 10 months. She was also prescribed a short supply of Protonix 40mg and Carafate from the hospital when she was in the ER 3/15/22. I have updated her medication list to show the Pepcid. And am sending over information to the prior authorization team.     Ashli Ritter RN [facility]      "

## 2022-04-28 ENCOUNTER — TELEPHONE (OUTPATIENT)
Dept: FAMILY MEDICINE | Facility: CLINIC | Age: 32
End: 2022-04-28
Payer: COMMERCIAL

## 2022-04-28 NOTE — TELEPHONE ENCOUNTER
Patient Quality Outreach    Patient is due for the following:   Asthma  -  ACT needed    NEXT STEPS:   No follow up needed at this time.    Type of outreach:    Sent [employer] message.    Questions for provider review:    None     Kayce Johnson RN

## 2022-04-28 NOTE — TELEPHONE ENCOUNTER
Noted. Patient will need to see provider, recommend in patient or virtual as a follow up if there are further needs as last visit with provider was in July 2021. Please help her set up an appointment.      Thank you,  PATRICIA Everett, NP-C  [facility]

## 2022-04-28 NOTE — TELEPHONE ENCOUNTER
Lvm for pt to schedule follow up appointment with provider regarding colonoscopy and endoscopy .

## 2022-05-02 ENCOUNTER — TELEPHONE (OUTPATIENT)
Dept: DERMATOLOGY | Facility: CLINIC | Age: 32
End: 2022-05-02
Payer: COMMERCIAL

## 2022-05-02 NOTE — TELEPHONE ENCOUNTER
[facility] Call Center    Phone Message    May a detailed message be left on voicemail: yes     Reason for Call: Appointment Intake    Referring Provider Name: NA  Diagnosis and/or Symptoms: New Pt for hair loss  Pt would like to see Dr. Catalan and protocols state only schedule with return Pt. Only. Pt is new for hair loss and sees Dr. Catalan for general derm. Please call Pt to discuss.   Thanks     Action Taken: Message routed to:  [facility] Derm    Travel Screening: Not Applicable

## 2022-05-02 NOTE — TELEPHONE ENCOUNTER
Called and informed patient of Dr. Catalan not seeing patients for hair loss and that this would be an appointment with Dr. Leo for she is our hair dermatologist. Patient ok with this and scheduled for phone visit on 5/16/2022 at  11:15am      Tanya Morfin LPN

## 2022-05-05 ENCOUNTER — TELEPHONE (OUTPATIENT)
Dept: GASTROENTEROLOGY | Facility: CLINIC | Age: 32
End: 2022-05-05

## 2022-05-05 ENCOUNTER — VIRTUAL VISIT (OUTPATIENT)
Dept: FAMILY MEDICINE | Facility: CLINIC | Age: 32
End: 2022-05-05
Payer: COMMERCIAL

## 2022-05-05 DIAGNOSIS — K59.00 CONSTIPATION, UNSPECIFIED CONSTIPATION TYPE: ICD-10-CM

## 2022-05-05 DIAGNOSIS — K21.9 GASTROESOPHAGEAL REFLUX DISEASE, UNSPECIFIED WHETHER ESOPHAGITIS PRESENT: Primary | ICD-10-CM

## 2022-05-05 PROCEDURE — 99214 OFFICE O/P EST MOD 30 MIN: CPT | Mod: 95 | Performed by: PHYSICIAN ASSISTANT

## 2022-05-05 RX ORDER — PANTOPRAZOLE SODIUM 40 MG/1
40 TABLET, DELAYED RELEASE ORAL DAILY
Qty: 90 TABLET | Refills: 0 | Status: SHIPPED | OUTPATIENT
Start: 2022-05-05 | End: 2022-10-10

## 2022-05-05 RX ORDER — SUCRALFATE ORAL 1 G/10ML
1 SUSPENSION ORAL 4 TIMES DAILY
Qty: 414 ML | Refills: 3 | Status: SHIPPED | OUTPATIENT
Start: 2022-05-05 | End: 2023-02-22

## 2022-05-05 NOTE — TELEPHONE ENCOUNTER
[facility] Call Center    Phone Message    May a detailed message be left on voicemail: yes     Reason for Call: Appointment Intake    Referring Provider Name: Becky Ramirez PA-C  Diagnosis and/or Symptoms: Constipation, unspecified constipation type [K59.00]  Gastroesophageal reflux disease, unspecified whether esophagitis present [K21.9]    Patient is being referred for constipation and GERD and is experiencing weight loss. Patient was unable to provide a specific amount and time frame. Please review per scheduling guidelines. Thanks!     Action Taken: Message routed to:  [facility] GI    Travel Screening: Not Applicable

## 2022-05-05 NOTE — PROGRESS NOTES
"Peewee is a 32 year old who is being evaluated via a billable video visit.      How would you like to obtain your AVS? MyChart  If the video visit is dropped, the invitation should be resent by: Send to e-mail at: [email or web address]  Will anyone else be joining your video visit? No      Video Start Time: 10:58 AM    Assessment & Plan     Gastroesophageal reflux disease, unspecified whether esophagitis present  Symptoms improved in past with shortterm use of protonix and carafate- sent in 90 day supply  Recommend endoscopy to further evaluate as well as follow up with gastroenterologist   - sucralfate (CARAFATE) 1 GM/10ML suspension  Dispense: 414 mL; Refill: 3  - pantoprazole (PROTONIX) 40 MG EC tablet  Dispense: 90 tablet; Refill: 0  - Adult Gastro Ref - Procedure Only    Constipation, unspecified constipation type  Recommend colonoscopy given length of symptoms   - Adult Gastro Ref - Consult Only  - Adult Gastro Ref - Procedure Only               BMI:   Estimated body mass index is 28.57 kg/m  as calculated from the following:    Height as of 7/13/21: 1.676 m (5' 6\").    Weight as of 8/19/21: 80.3 kg (177 lb).           No follow-ups on file.    Becky Ramirez PA-C  [facility]   Peewee is a 32 year old who presents for the following health issues     HPI   Pt's insurance would not cover the endoscopy/colonoscopy so wondering what steps to take    Was In emergency department couple months ago - epigastric pain that came out of no where- had negative ultrasound and labs-sent home with 10 day course of protonix and carafate  Symptoms for several months- had seen me for physical 9 months ago with heartburn and constipation and endoscopy and colonoscopy were ordered at that time but was informed by insurance that they would not cover it since not on med for 90 days   Usually able to take over the counter medication to calm it (pepcid)  Over the counter medications weren't " helpful on day of emergency department visit .    10 day prescription and symptoms improved.  Not had stomach pain as bad  Does keep having stomach issues, takes a lot of pepcid, mylanta, tums  Constipated a lot as well  Typically bowel movement every 3 -4 days and forcing it.  Permanent hemorhoids   Only time blood in stools notice with menses  Sometimes black stools-once every blue moon           Review of Systems   Constitutional, HEENT, cardiovascular, pulmonary, gi and gu systems are negative, except as otherwise noted.      Objective           Vitals:  No vitals were obtained today due to virtual visit.    Physical Exam   GENERAL: Healthy, alert and no distress  EYES: Eyes grossly normal to inspection.  No discharge or erythema, or obvious scleral/conjunctival abnormalities.  RESP: No audible wheeze, cough, or visible cyanosis.  No visible retractions or increased work of breathing.    SKIN: Visible skin clear. No significant rash, abnormal pigmentation or lesions.  NEURO: Cranial nerves grossly intact.  Mentation and speech appropriate for age.  PSYCH: Mentation appears normal, affect normal/bright, judgement and insight intact, normal speech and appearance well-groomed.                Video-Visit Details    Type of service:  Video Visit    Video End Time:11:07 AM    Originating Location (pt. Location): Home    Distant Location (provider location):  [facility]     Platform used for Video Visit: [employer]

## 2022-05-06 NOTE — TELEPHONE ENCOUNTER
Per review of chart, last in person office visit on 8/19/2021 had recorded weight of 177 lb.  ED visit on 3/15/2022 has recorded weight of 175 lb.  No other documented weights.  Recorded weights are stable - appointment appropriately scheduled.    Jade Schuster RN

## 2022-05-13 ENCOUNTER — TELEPHONE (OUTPATIENT)
Dept: DERMATOLOGY | Facility: CLINIC | Age: 32
End: 2022-05-13
Payer: COMMERCIAL

## 2022-05-13 NOTE — TELEPHONE ENCOUNTER
Called patient to remind her to send photos for appointment with Dr. Leo on 5/16, they can be sent via [employer] in reply to the message with instructions sent on 5/12.    JEVON Taylor

## 2022-05-16 ENCOUNTER — VIRTUAL VISIT (OUTPATIENT)
Dept: DERMATOLOGY | Facility: CLINIC | Age: 32
End: 2022-05-16
Payer: COMMERCIAL

## 2022-05-16 DIAGNOSIS — L65.9 LOSS OF HAIR: Primary | ICD-10-CM

## 2022-05-16 PROCEDURE — 99214 OFFICE O/P EST MOD 30 MIN: CPT | Mod: 95 | Performed by: DERMATOLOGY

## 2022-05-16 RX ORDER — FLUOCINOLONE ACETONIDE 0.11 MG/ML
OIL TOPICAL
Qty: 60 ML | Refills: 1 | Status: SHIPPED | OUTPATIENT
Start: 2022-05-16 | End: 2022-07-27

## 2022-05-16 ASSESSMENT — PATIENT HEALTH QUESTIONNAIRE - PHQ9: SUM OF ALL RESPONSES TO PHQ QUESTIONS 1-9: 11

## 2022-05-16 NOTE — Clinical Note
Dimitrios cornell hair metrix visit at either 8:30am or closer to 12:30okay to send without calling

## 2022-05-16 NOTE — NURSING NOTE
Chief Complaint   Patient presents with     Hair Loss     Teledermatology Nurse Call Patients:     Are you in the [facility] at the time of the encounter? yes    Today's visit will be billed to you and your insurance.    A teledermatology visit is not as thorough as an in-person visit and the quality of the photograph sent may not be of the same quality as that taken by the dermatology clinic.

## 2022-05-16 NOTE — PROGRESS NOTES
[facility] Dermatology Note  Encounter Date: May 16, 2022  Store-and-Forward and Telephone ([phone] (). Location of teledermatologist: [facility].  Start time: 12:01pm. End time: 12:14pm.    Dermatology Problem List:  1. Acne vulgaris  - current tx: spironolactone, differin 0.3% gel  - previous tx: doxycycline 100 mg PO BID, tretinoin 0.025% cream, clindamycin 1% lotion, BP 4-5% wash    2. Grandma with hair loss and aunt, no first degree relatives with hair loss to her knowledge  No family or personal history of fibroids or breast cancer.   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    - consider biopsy, reviewed with patient  -derma-smoothe oil-Apply once daily to the scalp for 4 weeks, then 3 times weekly as needed  -has tubal ligation  -wash hair once weekly as tolerated  - pony tails loose      #low hemoglobin  -recheck, pt unaware       Follow-up:  For hair metrix and labs. Schedule another in person visit to consider biopsy after that.        Staff:     Sandie Leo MD    Department of Dermatology  [facility]: Phone: [phone], Fax:[phone]  [facility]: Phone: [phone], Fax: [phone]      ____________________________________________    CC: Hair Loss    HPI:  Ms. Peewee Wilson is a(n) 32 year old female who presents today as a return patient for hair loss new assessment. Reports over years. Has been tender to the touch. Spreading the last year worse.     The patient is using coconut oil. Wild rebel oil. Pantene shampoo    She has had coronavirus in 2020, did have infection.       She  does not wear down. She does wear candelaria normally or pony tail.     The patient denies processing or perms    Patient is otherwise feeling well, without additional skin concerns.    Labs Reviewed:  Specimen:    Skin, Left neck                                                                            Final Diagnosis   A(1). Skin, Left neck:  - Neurofibroma - (see description)      Ref Range & Units 2 mo ago   WHITE BLOOD COUNT         4.5 - 11.0 thou/cu mm 9.1    RED BLOOD COUNT           4.00 - 5.20 mil/cu mm 4.17    HEMOGLOBIN                12.0 - 16.0 g/dL 11.6 Low     HEMATOCRIT                33.0 - 51.0 % 36.1    MCV                       80 - 100 fL 87    MCH                       26.0 - 34.0 pg 27.8    MCHC                      32.0 - 36.0 g/dL 32.1    RDW                       11.5 - 15.5 % 12.7    PLATELET COUNT            140 - 440 thou/cu mm 273    MPV                       6.5 - 11.0 fL 10.9    NEUTROPHILS               % 72.4    LYMPHOCYTES               % 20.4    MONOCYTES                 % 5.5    EOSINOPHILS               % 1.2    BASOPHILS                 % 0.3    IMMATURE GRANULOCYTES(METAS,MYELOS,PROS) % 0.2    ABSOLUTE NEUTROPHILS      1.7 - 7.0 thou/cu mm 6.5    ABSOLUTE LYMPHOCYTES      0.9 - 2.9 thou/cu mm 1.9    ABSOLUTE MONOCYTES        <0.9 thou/cu mm 0.5    ABSOLUTE EOSINOPHILS      <0.5 thou/cu mm 0.1    ABSOLUTE BASOPHILS        <0.3 thou/cu mm 0.0    ABSOLUTE IMMATURE GRANULOCYTES(METAS,MYELOS,PROS) <0.3 thou/cu mm 0.0        Physical Exam:  Vitals: There were no vitals taken for this visit.  SKIN: Teledermatology photos were reviewed; image quality and interpretability: acceptable. Image date: some 3 weeks ago and some this am  -possibly hair breakage  -  Hair loss on the crown with hair growth peripherally  - No other lesions of concern on areas examined.     Medications:  Current Outpatient Medications   Medication     adapalene (DIFFERIN) 0.3 % external gel     albuterol (PROAIR  HFA/PROVENTIL HFA/VENTOLIN HFA) 108 (90 Base) MCG/ACT inhaler     bisacodyl (DULCOLAX) 5 MG EC tablet     famotidine (PEPCID) 10 MG tablet     fluticasone (FLOVENT HFA) 110 MCG/ACT inhaler     magnesium citrate solution     pantoprazole (PROTONIX) 40 MG EC tablet     spironolactone (ALDACTONE) 50 MG tablet     sucralfate (CARAFATE) 1 GM/10ML suspension     No current facility-administered medications for this visit.      Past Medical/Surgical History:   Patient Active Problem List   Diagnosis     Exercise-induced asthma     Mild persistent asthma with acute exacerbation     Past Medical History:   Diagnosis Date     Asthma      Cervical cancer screening     10/7/09 NIL pap, + HR HPV (not 16 or 18)  9/1/10 NIL Pap, Neg HPV 11 NIL pap 7/3/12 ASCUS pap, neg HPV 3/9/16 NIL Pap, Neg HPV. 19 NIL Pap, Neg HPV. Plan: cotest in 5 yr     History of  delivery, currently pregnant 2018     Migraines        CC No referring provider defined for this encounter. on close of this encounter.

## 2022-05-16 NOTE — PATIENT INSTRUCTIONS
[facility] Dermatology Visit    Thank you for allowing us to participate in your care. Your findings, instructions and follow-up plan are as follows:    Central centrifugal cicatricial alopecia or androgenetic alopecia, these are both possible    We should consider a biopsy     You could use the Head and Shoulders Royal Oils shampoo and conditioner. Can buy at [employer] online:         When should I call my doctor?  If you are worsening or not improving, please, contact us or seek urgent care as noted below.     Who should I call with questions (adults)?  [facility] (adult and pediatric): [phone]  [facility] (adult): [phone]  For urgent needs outside of business hours call the [facility] at [phone] and ask for the dermatology resident on call  If this is a medical emergency and you are unable to reach an ER, Call [phone]    Who should I call with questions (pediatric)?  [facility]- Pediatric Dermatology  Dr. Danyelle Qureshi, Dr. Daisy Mijares, Dr. Radha Hill, Leatha Sher, PA  Dr. Keiko Traore, Dr. Marisol Samuel & Dr. Hussain Becker  Non Urgent  Nurse Triage Line; [phone]- Bobbi and Ann CHAVEZ Care Coordinators   Lynette (/Complex ) [phone]    If you need a prescription refill, please contact your pharmacy. Refills are approved or denied by our physicians during normal business hours, Monday through Fridays  Per office policy, refills will not be granted if you have not been seen within the past year (or sooner depending on your child's condition).    Scheduling Information:  Pediatric Appointment Scheduling and Call Center [phone]  Radiology Scheduling- [phone]  Sedation Unit Scheduling- [phone]  [place] Scheduling- General [phone]; Pediatric Dermatology [phone]  Main  Services: [phone]  Georgian:  [phone]  Guinean: [phone]  Hmong/Bulgarian/Dontae: [phone]  Preadmission Nursing Department Fax Number: [phone] (fax all pre-operative paperwork to this number)    For urgent matters arising during evenings, weekends, or holidays that cannot wait for normal business hours please call [phone] and ask for the dermatology resident on call to be paged.

## 2022-05-16 NOTE — LETTER
5/16/2022         RE: Peewee Wilson  [address]  [place] [address]        Dear Colleague,    Thank you for referring your patient, Peewee Wilson, to the [facility]. Please see a copy of my visit note below.    [facility] Dermatology Note  Encounter Date: May 16, 2022  Store-and-Forward and Telephone ([phone] (). Location of teledermatologist: [facility].  Start time: 12:01pm. End time: 12:14pm.    Dermatology Problem List:  1. Acne vulgaris  - current tx: spironolactone, differin 0.3% gel  - previous tx: doxycycline 100 mg PO BID, tretinoin 0.025% cream, clindamycin 1% lotion, BP 4-5% wash    2. Grandma with hair loss and aunt, no first degree relatives with hair loss to her knowledge  No family or personal history of fibroids or breast cancer.   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    - consider biopsy, reviewed with patient  -derma-smoothe oil-Apply once daily to the scalp for 4 weeks, then 3 times weekly as needed  -has tubal ligation  -wash hair once weekly as tolerated  - pony tails loose      #low hemoglobin  -recheck, pt unaware       Follow-up:  For hair metrix and labs. Schedule another in person visit to consider biopsy after that.        Staff:     Sandie Leo MD    Department of Dermatology  [facility]: Phone: [phone], Fax:[phone]  [facility]: Phone: [phone], Fax: [phone]      ____________________________________________    CC: Hair Loss    HPI:  Ms. Peewee Wilson is a(n) 32 year old female who presents today as a  return patient for hair loss new assessment. Reports over years. Has been tender to the touch. Spreading the last year worse.     The patient is using coconut oil. Wild rebel oil. Pantene shampoo    She has had coronavirus in 2020, did have infection.       She does not wear down. She does wear candelaria normally or pony tail.     The patient denies processing or perms    Patient is otherwise feeling well, without additional skin concerns.    Labs Reviewed:  Specimen:    Skin, Left neck                                                                            Final Diagnosis   A(1). Skin, Left neck:  - Neurofibroma - (see description)      Ref Range & Units 2 mo ago   WHITE BLOOD COUNT         4.5 - 11.0 thou/cu mm 9.1    RED BLOOD COUNT           4.00 - 5.20 mil/cu mm 4.17    HEMOGLOBIN                12.0 - 16.0 g/dL 11.6 Low     HEMATOCRIT                33.0 - 51.0 % 36.1    MCV                       80 - 100 fL 87    MCH                       26.0 - 34.0 pg 27.8    MCHC                      32.0 - 36.0 g/dL 32.1    RDW                       11.5 - 15.5 % 12.7    PLATELET COUNT            140 - 440 thou/cu mm 273    MPV                       6.5 - 11.0 fL 10.9    NEUTROPHILS               % 72.4    LYMPHOCYTES               % 20.4    MONOCYTES                 % 5.5    EOSINOPHILS               % 1.2    BASOPHILS                 % 0.3    IMMATURE GRANULOCYTES(METAS,MYELOS,PROS) % 0.2    ABSOLUTE NEUTROPHILS      1.7 - 7.0 thou/cu mm 6.5    ABSOLUTE LYMPHOCYTES      0.9 - 2.9 thou/cu mm 1.9    ABSOLUTE MONOCYTES        <0.9 thou/cu mm 0.5    ABSOLUTE EOSINOPHILS      <0.5 thou/cu mm 0.1    ABSOLUTE BASOPHILS        <0.3 thou/cu mm 0.0    ABSOLUTE IMMATURE GRANULOCYTES(METAS,MYELOS,PROS) <0.3 thou/cu mm 0.0        Physical Exam:  Vitals: There were no vitals taken for this visit.  SKIN: Teledermatology photos were reviewed; image quality and interpretability: acceptable. Image date: some 3 weeks ago and some this  am  -possibly hair breakage  -  Hair loss on the crown with hair growth peripherally  - No other lesions of concern on areas examined.     Medications:  Current Outpatient Medications   Medication     adapalene (DIFFERIN) 0.3 % external gel     albuterol (PROAIR HFA/PROVENTIL HFA/VENTOLIN HFA) 108 (90 Base) MCG/ACT inhaler     bisacodyl (DULCOLAX) 5 MG EC tablet     famotidine (PEPCID) 10 MG tablet     fluticasone (FLOVENT HFA) 110 MCG/ACT inhaler     magnesium citrate solution     pantoprazole (PROTONIX) 40 MG EC tablet     spironolactone (ALDACTONE) 50 MG tablet     sucralfate (CARAFATE) 1 GM/10ML suspension     No current facility-administered medications for this visit.      Past Medical/Surgical History:   Patient Active Problem List   Diagnosis     Exercise-induced asthma     Mild persistent asthma with acute exacerbation     Past Medical History:   Diagnosis Date     Asthma      Cervical cancer screening     10/7/09 NIL pap, + HR HPV (not 16 or 18)  9/1/10 NIL Pap, Neg HPV 11 NIL pap 7/3/12 ASCUS pap, neg HPV 3/9/16 NIL Pap, Neg HPV. 19 NIL Pap, Neg HPV. Plan: cotest in 5 yr     History of  delivery, currently pregnant 2018     Migraines        CC No referring provider defined for this encounter. on close of this encounter.      Again, thank you for allowing me to participate in the care of your patient.        Sincerely,        Sandie Leo MD

## 2022-05-20 ENCOUNTER — LAB (OUTPATIENT)
Dept: LAB | Facility: CLINIC | Age: 32
End: 2022-05-20
Payer: COMMERCIAL

## 2022-05-20 DIAGNOSIS — L65.9 LOSS OF HAIR: ICD-10-CM

## 2022-05-20 LAB
BASOPHILS # BLD AUTO: 0 10E3/UL (ref 0–0.2)
BASOPHILS NFR BLD AUTO: 0 %
EOSINOPHIL # BLD AUTO: 0.1 10E3/UL (ref 0–0.7)
EOSINOPHIL NFR BLD AUTO: 2 %
ERYTHROCYTE [DISTWIDTH] IN BLOOD BY AUTOMATED COUNT: 13.1 % (ref 10–15)
FERRITIN SERPL-MCNC: 4 NG/ML (ref 12–150)
HCT VFR BLD AUTO: 35.8 % (ref 35–47)
HGB BLD-MCNC: 11.6 G/DL (ref 11.7–15.7)
LYMPHOCYTES # BLD AUTO: 2.3 10E3/UL (ref 0.8–5.3)
LYMPHOCYTES NFR BLD AUTO: 32 %
MCH RBC QN AUTO: 28.4 PG (ref 26.5–33)
MCHC RBC AUTO-ENTMCNC: 32.4 G/DL (ref 31.5–36.5)
MCV RBC AUTO: 88 FL (ref 78–100)
MONOCYTES # BLD AUTO: 0.6 10E3/UL (ref 0–1.3)
MONOCYTES NFR BLD AUTO: 8 %
NEUTROPHILS # BLD AUTO: 4.1 10E3/UL (ref 1.6–8.3)
NEUTROPHILS NFR BLD AUTO: 58 %
PLATELET # BLD AUTO: 305 10E3/UL (ref 150–450)
RBC # BLD AUTO: 4.08 10E6/UL (ref 3.8–5.2)
TSH SERPL DL<=0.005 MIU/L-ACNC: 0.69 MU/L (ref 0.4–4)
WBC # BLD AUTO: 7.1 10E3/UL (ref 4–11)

## 2022-05-20 PROCEDURE — 84630 ASSAY OF ZINC: CPT | Mod: 90

## 2022-05-20 PROCEDURE — 36415 COLL VENOUS BLD VENIPUNCTURE: CPT

## 2022-05-20 PROCEDURE — 82306 VITAMIN D 25 HYDROXY: CPT

## 2022-05-20 PROCEDURE — 99000 SPECIMEN HANDLING OFFICE-LAB: CPT

## 2022-05-20 PROCEDURE — 82728 ASSAY OF FERRITIN: CPT

## 2022-05-20 PROCEDURE — 84443 ASSAY THYROID STIM HORMONE: CPT

## 2022-05-20 PROCEDURE — 85025 COMPLETE CBC W/AUTO DIFF WBC: CPT

## 2022-05-21 LAB — DEPRECATED CALCIDIOL+CALCIFEROL SERPL-MC: 9 UG/L (ref 20–75)

## 2022-05-22 LAB — ZINC SERPL-MCNC: 78 UG/DL

## 2022-05-23 ENCOUNTER — MYC MEDICAL ADVICE (OUTPATIENT)
Dept: DERMATOLOGY | Facility: CLINIC | Age: 32
End: 2022-05-23
Payer: COMMERCIAL

## 2022-05-24 RX ORDER — FERROUS SULFATE 325(65) MG
325 TABLET, DELAYED RELEASE (ENTERIC COATED) ORAL DAILY
Qty: 90 TABLET | Refills: 0 | Status: SHIPPED | OUTPATIENT
Start: 2022-05-24 | End: 2022-08-24

## 2022-05-24 RX ORDER — ERGOCALCIFEROL 1.25 MG/1
50000 CAPSULE, LIQUID FILLED ORAL WEEKLY
Qty: 8 CAPSULE | Refills: 0 | Status: SHIPPED | OUTPATIENT
Start: 2022-05-24 | End: 2022-08-03

## 2022-05-25 ENCOUNTER — TELEPHONE (OUTPATIENT)
Dept: GASTROENTEROLOGY | Facility: CLINIC | Age: 32
End: 2022-05-25
Payer: COMMERCIAL

## 2022-05-25 ENCOUNTER — HOSPITAL ENCOUNTER (OUTPATIENT)
Facility: AMBULATORY SURGERY CENTER | Age: 32
End: 2022-05-25
Attending: INTERNAL MEDICINE
Payer: COMMERCIAL

## 2022-05-25 NOTE — TELEPHONE ENCOUNTER
Screening Questions  BlueKIND OF PREP RedLOCATION [review exclusion criteria] GreenSEDATION TYPE  1. Have you had a positive covid test in the last 90 days? N     2. Do you have a legal guardian or medical Power of ?  Are you able to give consent for your medical care?Y (Sedation review/consideration needed)    3. Are you active on mychart? Y    4. What insurance is in the chart? BP [place]     3.   Ordering/Referring Provider: HERBIE    4. BMI 29.1 [BMI OVER 40-EXTENDED PREP]  If greater than 40 review exclusion criteria [PAC APPT IF @ [facility]]        5.  Respiratory Screening :  [If yes to any of the following HOSPITAL setting only]     Do you use daily home oxygen? N    Do you have mod to severe Obstructive Sleep Apnea? N  [OKAY @ [facility] SH PH RI]   Do you have Pulmonary Hypertension? N     Do you have UNCONTROLLED asthma? N        6.   Have you had a heart or lung transplant? N      7.   Are you currently on dialysis? N [ If yes, G-PREP & HOSPITAL setting only]     8.   Do you have chronic kidney disease? N [ If yes, G-PREP ]    9.   Have you had a stroke or Transient ischemic attack (TIA - aka  mini stroke ) within 6 months?  N (If yes, please review exclusion criteria)    10.   In the past 6 months, have you had any heart related issues including cardiomyopathy or heart attack? N           If yes, did it require cardiac stenting or other implantable device?       11.   Do you have any implantable devices in your body (pacemaker, defib, LVAD)? N (If yes, please review exclusion criteria)    12.   Do you take nitroglycerin? N           If yes, how often?   (if yes, HOSPITAL setting ONLY)    13.   Are you currently taking any blood thinners? N           [IF YES, INFORM PATIENT TO FOLLOW UP W/ ORDERING PROVIDER FOR BRIDGING INSTRUCTIONS]     14.   Do you have a diagnosis of diabetes? N   [ If yes, G-PREP ]    15.   [FEMALES] Are you currently pregnant? N    If yes, how many weeks?     16.   Are you taking  any prescription pain medications on a routine schedule?  N  [ If yes, EXTENDED PREP.] [If yes, MAC]    17.   Do you have any chemical dependencies such as alcohol, street drugs, or methadone?  N [If yes, MAC]    18.   Do you have any history of post-traumatic stress syndrome, severe anxiety or history of psychosis?  N  [If yes, MAC]    19.   Do you transfer independently?  Y    20.  On a regular basis do you go 3-5 days between bowel movements? Y   [ If yes, EXTENDED PREP.]    21.   Preferred LOCAL Pharmacy for Pre Prescription      [id number] - [facility] [address] [place] [facility] [address]  **Pt likely has prep from cx'd procedure**    Scheduling Details      Caller : Peewee Wilson    (Please ask for phone number if not scheduled by patient)    Type of Procedure Scheduled: EGD & COLON  Which Colonoscopy Prep was Sent?: EXTENDED  KHORUTS CF PATIENTS & GROEN'S PATIENTS NEEDS EXTENDED PREP  Surgeon: LEVENTHAL  Date of Procedure: 6/29/22  Location: [facility]      Sedation Type: MAC  Conscious Sedation- Needs  for 6 hours after the procedure  MAC/General-Needs  for 24 hours after procedure    Pre-op Required at [facility] [place] [place] and OR for MAC sedation: N  (advise patient they will need a pre-op prior to procedure -)      Informed patient they will need an adult  Y  Cannot take any type of public or medical transportation alone    Pre-Pro[facility] or Externally: 6/25 @    Confirmed Nurse will call to complete assessment Y    Additional comments:

## 2022-06-21 ENCOUNTER — TELEPHONE (OUTPATIENT)
Dept: GASTROENTEROLOGY | Facility: CLINIC | Age: 32
End: 2022-06-21

## 2022-06-21 ENCOUNTER — TELEPHONE (OUTPATIENT)
Dept: GASTROENTEROLOGY | Facility: CLINIC | Age: 32
End: 2022-06-21
Payer: COMMERCIAL

## 2022-06-21 DIAGNOSIS — K59.00 CONSTIPATION: Primary | ICD-10-CM

## 2022-06-21 RX ORDER — BISACODYL 5 MG/1
TABLET, DELAYED RELEASE ORAL
Qty: 2 TABLET | Refills: 0 | Status: SHIPPED | OUTPATIENT
Start: 2022-06-21 | End: 2022-06-21

## 2022-06-21 NOTE — TELEPHONE ENCOUNTER
Caller: Peewee Wilson      Procedure: Colon/EGD    Date, Location, and Surgeon of Procedure Cancelled: 6/29, [facility]    Ordering Provider: Becky Ramirez PA-C    Reason for cancel (please be detailed, any staff messages or encounters to note?): Positive for Covid 7/17,        Rescheduled: Y     If rescheduled:    Date: 8/5   Location: [facility]   Prep Resent: Yes (changes to prep?)   Covid Test Rescheduled: No, tested positive for Covid 6/17   Note any change or update to original order/sedation:

## 2022-06-21 NOTE — TELEPHONE ENCOUNTER
Patient scheduled for EGD/Colonoscopy on 6/29/22.     The Pt reports testing positive for COVID on Friday, 6/17/22. She reports having severe symptoms and going to the ER. She will need to reschedule out past 20 days due to severity of symptoms. Transferred to scheduling.     Arrival time: 10:40am    Facility location: [facility]    Sedation type: MAC    Indication for procedure: GERD/Constipation     Anticoagulations? None     Bowel prep recommendation: Extended -- d/t constipation     Unable to send Extended Prep to [id number] [address] [place] [facility] [address] [facility]  Pharmacy, numerous pharmacy errors occurring, will need to retry. Prep instructions sent via [employer]    Pre visit planning completed.    Sade Johns RN

## 2022-07-06 ENCOUNTER — TELEPHONE (OUTPATIENT)
Dept: FAMILY MEDICINE | Facility: CLINIC | Age: 32
End: 2022-07-06

## 2022-07-06 NOTE — TELEPHONE ENCOUNTER
Patient Quality Outreach    Patient is due for the following:   Asthma  -  ACT needed    NEXT STEPS:   ACT sent    Type of outreach:    Sent [email or web address] message.    Next Steps:  Reach out within 90 days via Letter.    Max number of attempts reached: No. Will try again in 90 days if patient still on fail list.    Questions for provider review:    None     Rebecca Urias  Chart routed to n/a.

## 2022-07-27 ENCOUNTER — OFFICE VISIT (OUTPATIENT)
Dept: DERMATOLOGY | Facility: CLINIC | Age: 32
End: 2022-07-27
Payer: COMMERCIAL

## 2022-07-27 DIAGNOSIS — R79.0 LOW FERRITIN: Primary | ICD-10-CM

## 2022-07-27 DIAGNOSIS — L65.9 LOSS OF HAIR: ICD-10-CM

## 2022-07-27 PROCEDURE — 99214 OFFICE O/P EST MOD 30 MIN: CPT | Performed by: DERMATOLOGY

## 2022-07-27 RX ORDER — FLUOCINOLONE ACETONIDE 0.11 MG/ML
OIL TOPICAL
Qty: 60 ML | Refills: 1 | Status: SHIPPED | OUTPATIENT
Start: 2022-07-27 | End: 2022-08-24

## 2022-07-27 RX ORDER — FLUOCINOLONE ACETONIDE 0.11 MG/ML
OIL TOPICAL
Qty: 60 ML | Refills: 1 | Status: SHIPPED | OUTPATIENT
Start: 2022-07-27 | End: 2022-07-27

## 2022-07-27 RX ORDER — FERROUS SULFATE 325(65) MG
TABLET ORAL
Qty: 60 TABLET | Refills: 0 | Status: SHIPPED | OUTPATIENT
Start: 2022-07-27 | End: 2022-08-24

## 2022-07-27 NOTE — PROGRESS NOTES
[facility] Dermatology Note  Encounter Date: Jul 27, 2022  Office Visit     Dermatology Problem List:  1. Acne vulgaris  - current tx: spironolactone, differin 0.3% gel  - previous tx: doxycycline 100 mg PO BID, tretinoin 0.025% cream, clindamycin 1% lotion, BP 4-5% wash     2. Grandma with hair loss and aunt, no first degree relatives with hair loss to her knowledge  No family or personal history of fibroids or breast cancer.   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 Declines injections wants to check coverage  - -start minoxidil 5% foam or solution once daily. Keep away from face, counseled on risk of irritation and hair growth on face, risk of temporary shedfing. Keep product away from face. Stop if you become pregnant. Handout provided  - Continue spironolactone. No dizziness or lightheadedness. /85 today. Has tubal ligation  - Apply the derma-smoothe oil 3x weekly, has not started  -Continue to wash hair once a week with Head and shoulders Royal oils  -HairMetrix today, due 2023  - Patient will look into insurance coverage of ILK, routed to billing to contact her  - Avoid doxycycline at this time due to GI issues    #Low ferritin   - Start ferrous sulfate supplement every other day   -seeing GI    #vitamin D def  -complete script    Hair labs:  TSH:  0.69  Cbc: low hgb 05/2022, will recheck today  Ferritin:4 (low)  Zinc: 78  Vitamin D: 9 (low) -taking 50,000 international units once weekly.     Follow-up: 4-6 weeks. Have 3ccs of kenalog 2.5mg/cc ready in regular clinic room    Procedures Performed:   None    Follow-up: 4-6 week(s) in-person, or earlier for new or changing lesions    Staff and Scribe:     Scribe Disclosure:  Kelly STEWARD, am serving as a scribe to document services personally performed by Sandie Leo MD based on data collection and the provider's statements to  me.     I, Karla Jaquez, am serving as a scribe to document services personally performed by Sandie Leo MD based on data collection and the provider's statements to me.     Provider Disclosure:   The documentation recorded by the scribe accurately reflects the services I personally performed and the decisions made by me.    Sandie Leo MD    Department of Dermatology  [facility]: Phone: [phone], Fax:[phone]  [facility]: Phone: [phone], Fax: [phone]      ____________________________________________    CC: Derm Problem (Peewee is here to follow up on hair loss. No changes since last visit.)    HPI:  Ms. Peewee Wilson is a 32 year old female who presents as a return patient for follow-up of hair loss, diagnosed as possible CCCA or AGA. She has been treating with OTC Head and Shoulders Royal Oils without benefit. She was prescribed derma-smooth oil at her last visit, but she has not had a chance to pick this up yet. She endorses continued hair shedding and thinning without any regrowth. Her worst area is on the vertex scalp with some associated tenderness. Of note, she was positive Covid test on 6/17/2022.     Patient is otherwise feeling well, in usual state of health, and has no additional skin concerns today.     ROS: As per HPI    Labs:  As above    Physical Exam:  Vitals: There were no vitals taken for this visit.  GEN: Well developed, well-nourished, in no acute distress, in a pleasant mood.    SKIN: Focused examination of the scalp was performed.  - Gomez type: V - VI  - Thinning of frontal, temporal and crown of scalp  - No other lesions of concern on areas examined.     Medications:  Current Outpatient Medications   Medication     adapalene (DIFFERIN) 0.3 % external gel     albuterol (PROAIR HFA/PROVENTIL HFA/VENTOLIN HFA) 108 (90 Base) MCG/ACT inhaler      bisacodyl (DULCOLAX) 5 MG EC tablet     famotidine (PEPCID) 10 MG tablet     ferrous sulfate (FE TABS) 325 (65 Fe) MG EC tablet     Fluocinolone Acetonide Scalp (DERMA-SMOOTHE/FS SCALP) 0.01 % OIL oil     fluticasone (FLOVENT HFA) 110 MCG/ACT inhaler     magnesium citrate solution     magnesium citrate solution     pantoprazole (PROTONIX) 40 MG EC tablet     polyethylene glycol (GOLYTELY) 236 g suspension     spironolactone (ALDACTONE) 50 MG tablet     sucralfate (CARAFATE) 1 GM/10ML suspension     vitamin D2 (ERGOCALCIFEROL) 36773 units (1250 mcg) capsule     No current facility-administered medications for this visit.      Past Medical History:   Patient Active Problem List   Diagnosis     Exercise-induced asthma     Mild persistent asthma with acute exacerbation     Past Medical History:   Diagnosis Date     Asthma      Cervical cancer screening     10/7/09 NIL pap, + HR HPV (not 16 or 18)  9/1/10 NIL Pap, Neg HPV 11 NIL pap 7/3/12 ASCUS pap, neg HPV 3/9/16 NIL Pap, Neg HPV. 19 NIL Pap, Neg HPV. Plan: cotest in 5 yr     History of  delivery, currently pregnant 2018     Migraines        CC Referred Self, MD  No address on file on close of this encounter.

## 2022-07-27 NOTE — LETTER
7/27/2022       RE: Peewee Wilson  [address]  [place] [address]     Dear Colleague,    Thank you for referring your patient, Peewee Wilson, to the [facility] [place] at [facility]. Please see a copy of my visit note below.    erroneous    [facility] Dermatology Note  Encounter Date: Jul 27, 2022  Office Visit     Dermatology Problem List:  1. Acne vulgaris  - current tx: spironolactone, differin 0.3% gel  - previous tx: doxycycline 100 mg PO BID, tretinoin 0.025% cream, clindamycin 1% lotion, BP 4-5% wash     2. Grandma with hair loss and aunt, no first degree relatives with hair loss to her knowledge  No family or personal history of fibroids or breast cancer.   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 Declines injections wants to check coverage  - -start minoxidil 5% foam or solution once daily. Keep away from face, counseled on risk of irritation and hair growth on face, risk of temporary shedfing. Keep product away from face. Stop if you become pregnant. Handout provided  - Continue spironolactone. No dizziness or lightheadedness. /85 today. Has tubal ligation  - Apply the derma-smoothe oil 3x weekly, has not started  -Continue to wash hair once a week with Head and shoulders Royal oils  -HairMetrix today, due 2023  - Patient will look into insurance coverage of ILK, routed to billing to contact her  - Avoid doxycycline at this time due to GI issues    #Low ferritin   - Start ferrous sulfate supplement every other day   -seeing GI    #vitamin D def  -complete script    Hair labs:  TSH:  0.69  Cbc: low hgb 05/2022, will recheck today  Ferritin:4 (low)  Zinc: 78  Vitamin D: 9 (low) -taking 50,000 international units once weekly.     Follow-up: 4-6 weeks. Have 3ccs of kenalog 2.5mg/cc  ready in regular clinic room    Procedures Performed:   None    Follow-up: 4-6 week(s) in-person, or earlier for new or changing lesions    Staff and Scribe:     Scribe Disclosure:  I, Kelly Monique, am serving as a scribe to document services personally performed by Sandie Leo MD based on data collection and the provider's statements to me.     I, Karla Jqauez, am serving as a scribe to document services personally performed by Sandie Leo MD based on data collection and the provider's statements to me.     Provider Disclosure:   The documentation recorded by the scribe accurately reflects the services I personally performed and the decisions made by me.    Sandie Leo MD    Department of Dermatology  [facility]: Phone: [phone], Fax:[phone]  [facility]: Phone: [phone], Fax: [phone]      ____________________________________________    CC: Derm Problem (Peewee is here to follow up on hair loss. No changes since last visit.)    HPI:  Ms. Peewee Wilson is a 32 year old female who presents as a return patient for follow-up of hair loss, diagnosed as possible CCCA or AGA. She has been treating with OTC Head and Shoulders Royal Oils without benefit. She was prescribed derma-smooth oil at her last visit, but she has not had a chance to pick this up yet. She endorses continued hair shedding and thinning without any regrowth. Her worst area is on the vertex scalp with some associated tenderness. Of note, she was positive Covid test on 6/17/2022.     Patient is otherwise feeling well, in usual state of health, and has no additional skin concerns today.     ROS: As per HPI    Labs:  As above    Physical Exam:  Vitals: There were no vitals taken for this visit.  GEN: Well developed, well-nourished, in no acute distress, in a pleasant mood.    SKIN: Focused examination of  the scalp was performed.  - Gomez type: V - VI  - Thinning of frontal, temporal and crown of scalp  - No other lesions of concern on areas examined.     Medications:  Current Outpatient Medications   Medication     adapalene (DIFFERIN) 0.3 % external gel     albuterol (PROAIR HFA/PROVENTIL HFA/VENTOLIN HFA) 108 (90 Base) MCG/ACT inhaler     bisacodyl (DULCOLAX) 5 MG EC tablet     famotidine (PEPCID) 10 MG tablet     ferrous sulfate (FE TABS) 325 (65 Fe) MG EC tablet     Fluocinolone Acetonide Scalp (DERMA-SMOOTHE/FS SCALP) 0.01 % OIL oil     fluticasone (FLOVENT HFA) 110 MCG/ACT inhaler     magnesium citrate solution     magnesium citrate solution     pantoprazole (PROTONIX) 40 MG EC tablet     polyethylene glycol (GOLYTELY) 236 g suspension     spironolactone (ALDACTONE) 50 MG tablet     sucralfate (CARAFATE) 1 GM/10ML suspension     vitamin D2 (ERGOCALCIFEROL) 81651 units (1250 mcg) capsule     No current facility-administered medications for this visit.      Past Medical History:   Patient Active Problem List   Diagnosis     Exercise-induced asthma     Mild persistent asthma with acute exacerbation     Past Medical History:   Diagnosis Date     Asthma      Cervical cancer screening     10/7/09 NIL pap, + HR HPV (not 16 or 18)  9/1/10 NIL Pap, Neg HPV 11 NIL pap 7/3/12 ASCUS pap, neg HPV 3/9/16 NIL Pap, Neg HPV. 19 NIL Pap, Neg HPV. Plan: cotest in 5 yr     History of  delivery, currently pregnant 2018     Migraines        CC Referred Self, MD  No address on file on close of this encounter.

## 2022-07-27 NOTE — NURSING NOTE
Dermatology Rooming Note    Peewee Wilson's goals for this visit include:   Chief Complaint   Patient presents with     Derm Problem     Peewee is here to follow up on hair loss. No changes since last visit.

## 2022-07-27 NOTE — PATIENT INSTRUCTIONS
Check insurance coverage for injections  Use Minoxidil 5% nightly  Use fluocinolone oil 3 times weekly at night. Okay to put on top of minoxidil  Wash scalp once weekly with head and shoulders line  Continue spironolactone      Topical Rogaine (Minoxidil) for Pattern Hair Loss    Minoxidil is an FDA approved over the counter topical for the treatment of hair loss and thinning hair in men and women.   Initially a 2% solution was available however this required application twice daily. A 5% solution is also now approved, only requiring application once per day.     Available Products:   Rogaine 5% solution: Packaged for men however can be used by men or women. Use dropper and apply directly to scalp at bedtime. This product can cause an allergy because of presence of propylene glycol. Stop this product if you develop a rash or itching and contact your physician.   Rogaine 5% foam: Packaged for men and women: Apply foam directly to the scalp once daily. This is less greasy compared to the solution. This formula is preferred for those who had a reaction to the solution product. If you develop rash or itching, stop the product and contact your physician.     What if I stop minoxidil topical?   After stopping minoxidil the hair will return to the usual pattern of thinning. Using the product 3-4 times per week is better than not using product at all.       Can I use generic minoxidil?   Yes, look for 5% minoxidil.     What are the side effects?  The most common side effect is rash or itching of the scalp. This can occur if a contact allergy develops with propylene glycol. A small group of patients noticed the appearance of facial hair if the product runs onto the face or with prolonged use. Keep it away from the face.  This can cause temporary shedding. Please, call us if this happens.  This can irritated the scalp. Please, call us if this happens.  Stop this product if you become pregnant or are breastfeeding      Last  updated: 11/2/2021

## 2022-08-02 RX ORDER — BISACODYL 5 MG
1 TABLET, DELAYED RELEASE (ENTERIC COATED) ORAL SEE ADMIN INSTRUCTIONS
Qty: 4 TABLET | Refills: 0 | Status: SHIPPED | OUTPATIENT
Start: 2022-08-02 | End: 2022-08-24

## 2022-08-02 NOTE — TELEPHONE ENCOUNTER
Reschedule colonoscopy/EGD.    Pre assessment questions completed for upcoming colonoscopy/EGD procedure scheduled on 8.5.2022    Positive COVID test on 6.17.2022.  Pt is aware that they need to be s/sx free for 14 days prior to procedure.  If pt develops s/sx within this time they have been instructed to call and reschedule their endoscopy appt.  Pt agreeable to plan.    Reviewed procedural arrival time 1245 and facility location [facility].    Designated  policy reviewed. Instructed to have someone stay 24 hours post procedure.     Anticoagulation/blood thinners? no    Electronic implanted devices? no    Reviewed Extended prep instructions with patient. No fiber/iron supplements or foods that contain nuts/seeds prior to procedure.     Patient verbalized understanding and had no questions or concerns at this time.    Faviola Valdez RN

## 2022-08-03 ENCOUNTER — MYC REFILL (OUTPATIENT)
Dept: LAB | Facility: CLINIC | Age: 32
End: 2022-08-03

## 2022-08-03 DIAGNOSIS — L65.9 LOSS OF HAIR: ICD-10-CM

## 2022-08-03 NOTE — TELEPHONE ENCOUNTER
vitamin D2 (ERGOCALCIFEROL) 58028 units (1250 mcg) capsule    Take 1 capsule (50,000 Units) by mouth once a week   Last Written Prescription Date:  5/24/22  Last Fill Quantity: 8,   # refills: 0  Last Office Visit : 7/27/22  Future Office visit:  8/24/22    Routing refill request to provider for review/approval because:  Drug not on the [facility] or [facility] refill protocol   Because dose is 50,000

## 2022-08-05 ENCOUNTER — ANESTHESIA EVENT (OUTPATIENT)
Dept: SURGERY | Facility: AMBULATORY SURGERY CENTER | Age: 32
End: 2022-08-05
Payer: COMMERCIAL

## 2022-08-05 ENCOUNTER — ANESTHESIA (OUTPATIENT)
Dept: SURGERY | Facility: AMBULATORY SURGERY CENTER | Age: 32
End: 2022-08-05
Payer: COMMERCIAL

## 2022-08-05 ENCOUNTER — HOSPITAL ENCOUNTER (OUTPATIENT)
Facility: AMBULATORY SURGERY CENTER | Age: 32
Discharge: HOME | End: 2022-08-05
Attending: INTERNAL MEDICINE
Payer: COMMERCIAL

## 2022-08-05 VITALS
SYSTOLIC BLOOD PRESSURE: 134 MMHG | RESPIRATION RATE: 16 BRPM | DIASTOLIC BLOOD PRESSURE: 78 MMHG | TEMPERATURE: 97.6 F | HEART RATE: 84 BPM | OXYGEN SATURATION: 100 %

## 2022-08-05 VITALS — HEART RATE: 104 BPM

## 2022-08-05 DIAGNOSIS — R10.84 ABDOMINAL PAIN, GENERALIZED: Primary | ICD-10-CM

## 2022-08-05 LAB
COLONOSCOPY: NORMAL
HCG UR QL: NEGATIVE
INTERNAL QC OK POCT: NORMAL
POCT KIT EXPIRATION DATE: NORMAL
POCT KIT LOT NUMBER: NORMAL
UPPER GI ENDOSCOPY: NORMAL

## 2022-08-05 PROCEDURE — 43239 EGD BIOPSY SINGLE/MULTIPLE: CPT

## 2022-08-05 PROCEDURE — 81025 URINE PREGNANCY TEST: CPT | Performed by: PATHOLOGY

## 2022-08-05 PROCEDURE — 88305 TISSUE EXAM BY PATHOLOGIST: CPT | Mod: TC | Performed by: INTERNAL MEDICINE

## 2022-08-05 PROCEDURE — 45385 COLONOSCOPY W/LESION REMOVAL: CPT

## 2022-08-05 RX ORDER — ONDANSETRON 2 MG/ML
4 INJECTION INTRAMUSCULAR; INTRAVENOUS
Status: DISCONTINUED | OUTPATIENT
Start: 2022-08-05 | End: 2022-08-05 | Stop reason: HOSPADM

## 2022-08-05 RX ORDER — LIDOCAINE HYDROCHLORIDE 20 MG/ML
INJECTION, SOLUTION INFILTRATION; PERINEURAL PRN
Status: DISCONTINUED | OUTPATIENT
Start: 2022-08-05 | End: 2022-08-05

## 2022-08-05 RX ORDER — FLUMAZENIL 0.1 MG/ML
0.2 INJECTION, SOLUTION INTRAVENOUS
Status: DISCONTINUED | OUTPATIENT
Start: 2022-08-05 | End: 2022-08-06 | Stop reason: HOSPADM

## 2022-08-05 RX ORDER — PROCHLORPERAZINE MALEATE 10 MG
10 TABLET ORAL EVERY 6 HOURS PRN
Status: DISCONTINUED | OUTPATIENT
Start: 2022-08-05 | End: 2022-08-06 | Stop reason: HOSPADM

## 2022-08-05 RX ORDER — NALOXONE HYDROCHLORIDE 0.4 MG/ML
0.2 INJECTION, SOLUTION INTRAMUSCULAR; INTRAVENOUS; SUBCUTANEOUS
Status: DISCONTINUED | OUTPATIENT
Start: 2022-08-05 | End: 2022-08-06 | Stop reason: HOSPADM

## 2022-08-05 RX ORDER — LIDOCAINE 40 MG/G
CREAM TOPICAL
Status: DISCONTINUED | OUTPATIENT
Start: 2022-08-05 | End: 2022-08-05 | Stop reason: HOSPADM

## 2022-08-05 RX ORDER — ERGOCALCIFEROL 1.25 MG/1
50000 CAPSULE, LIQUID FILLED ORAL WEEKLY
Qty: 4 CAPSULE | Refills: 1 | Status: SHIPPED | OUTPATIENT
Start: 2022-08-05 | End: 2022-08-24

## 2022-08-05 RX ORDER — GLYCOPYRROLATE 0.2 MG/ML
INJECTION, SOLUTION INTRAMUSCULAR; INTRAVENOUS PRN
Status: DISCONTINUED | OUTPATIENT
Start: 2022-08-05 | End: 2022-08-05

## 2022-08-05 RX ORDER — SODIUM CHLORIDE, SODIUM LACTATE, POTASSIUM CHLORIDE, CALCIUM CHLORIDE 600; 310; 30; 20 MG/100ML; MG/100ML; MG/100ML; MG/100ML
INJECTION, SOLUTION INTRAVENOUS CONTINUOUS
Status: DISCONTINUED | OUTPATIENT
Start: 2022-08-05 | End: 2022-08-05 | Stop reason: HOSPADM

## 2022-08-05 RX ORDER — ONDANSETRON 4 MG/1
4 TABLET, ORALLY DISINTEGRATING ORAL EVERY 6 HOURS PRN
Status: DISCONTINUED | OUTPATIENT
Start: 2022-08-05 | End: 2022-08-06 | Stop reason: HOSPADM

## 2022-08-05 RX ORDER — PROPOFOL 10 MG/ML
INJECTION, EMULSION INTRAVENOUS CONTINUOUS PRN
Status: DISCONTINUED | OUTPATIENT
Start: 2022-08-05 | End: 2022-08-05

## 2022-08-05 RX ORDER — NALOXONE HYDROCHLORIDE 0.4 MG/ML
0.4 INJECTION, SOLUTION INTRAMUSCULAR; INTRAVENOUS; SUBCUTANEOUS
Status: DISCONTINUED | OUTPATIENT
Start: 2022-08-05 | End: 2022-08-06 | Stop reason: HOSPADM

## 2022-08-05 RX ORDER — ERGOCALCIFEROL 1.25 MG/1
50000 CAPSULE, LIQUID FILLED ORAL WEEKLY
Qty: 4 CAPSULE | Refills: 0 | Status: SHIPPED | OUTPATIENT
Start: 2022-08-05 | End: 2022-08-05

## 2022-08-05 RX ORDER — PROPOFOL 10 MG/ML
INJECTION, EMULSION INTRAVENOUS PRN
Status: DISCONTINUED | OUTPATIENT
Start: 2022-08-05 | End: 2022-08-05

## 2022-08-05 RX ORDER — ONDANSETRON 2 MG/ML
4 INJECTION INTRAMUSCULAR; INTRAVENOUS EVERY 6 HOURS PRN
Status: DISCONTINUED | OUTPATIENT
Start: 2022-08-05 | End: 2022-08-06 | Stop reason: HOSPADM

## 2022-08-05 RX ADMIN — LIDOCAINE HYDROCHLORIDE 60 MG: 20 INJECTION, SOLUTION INFILTRATION; PERINEURAL at 13:17

## 2022-08-05 RX ADMIN — SODIUM CHLORIDE, SODIUM LACTATE, POTASSIUM CHLORIDE, CALCIUM CHLORIDE: 600; 310; 30; 20 INJECTION, SOLUTION INTRAVENOUS at 13:07

## 2022-08-05 RX ADMIN — GLYCOPYRROLATE 0.2 MG: 0.2 INJECTION, SOLUTION INTRAMUSCULAR; INTRAVENOUS at 13:20

## 2022-08-05 RX ADMIN — PROPOFOL 50 MG: 10 INJECTION, EMULSION INTRAVENOUS at 13:17

## 2022-08-05 RX ADMIN — PROPOFOL 200 MCG/KG/MIN: 10 INJECTION, EMULSION INTRAVENOUS at 13:17

## 2022-08-05 NOTE — H&P
Peewee Wilson  [id number]  female  32 year old      Reason for procedure/surgery: Constipation    Patient Active Problem List   Diagnosis     Exercise-induced asthma     Mild persistent asthma with acute exacerbation       Past Surgical History:    Past Surgical History:   Procedure Laterality Date     ABDOMEN SURGERY      2 c-sections     GYN SURGERY      dermoid cyst     TUBAL LIGATION         Past Medical History:   Past Medical History:   Diagnosis Date     Asthma      Cervical cancer screening     10/7/09 NIL pap, + HR HPV (not 16 or 18)  9/1/10 NIL Pap, Neg HPV 11 NIL pap 7/3/12 ASCUS pap, neg HPV 3/9/16 NIL Pap, Neg HPV. 19 NIL Pap, Neg HPV. Plan: cotest in 5 yr     History of  delivery, currently pregnant 2018     Migraines        Social History:   Social History     Tobacco Use     Smoking status: Former Smoker     Smokeless tobacco: Never Used     Tobacco comment: quit 2019 - was smoking 3 cigarettes daily   Substance Use Topics     Alcohol use: No       Family History:   Family History   Problem Relation Age of Onset     No Known Problems Father      No Known Problems Mother      No Known Problems Sister      No Known Problems Sister      No Known Problems Sister      No Known Problems Sister      No Known Problems Brother      No Known Problems Brother      Diabetes Other         couple aunts- maternal great aunts     Colon Cancer No family hx of      Breast Cancer No family hx of      Coronary Artery Disease No family hx of        Allergies: No Known Allergies    Active Medications:   Current Outpatient Medications   Medication Sig Dispense Refill     adapalene (DIFFERIN) 0.3 % external gel Apply a pea-sized amount evenly over the face at nighttime before bed. 45 g 11     albuterol (PROAIR HFA/PROVENTIL HFA/VENTOLIN HFA) 108 (90 Base) MCG/ACT inhaler Inhale 2 puffs into the lungs every 4 hours as needed for shortness of breath / dyspnea or wheezing 18 g 0     bisacodyl  (DULCOLAX) 5 MG EC tablet Take 1 tablet (5 mg) by mouth See Admin Instructions --2 days prior to procedure, take two (2) tablets at 4 pm.  1 day prior to procedure, take two (2) tablets at 4 pm.  For additional instructions refer to you colonoscopy prep instructions. 4 tablet 0     bisacodyl (DULCOLAX) 5 MG EC tablet Take as directed. One day prior to exam at 10:00am take 2 tablets 2 tablet 0     famotidine (PEPCID) 10 MG tablet Take 10 mg by mouth 2 times daily       ferrous sulfate (FE TABS) 325 (65 Fe) MG EC tablet Take 1 tablet (325 mg) by mouth daily 90 tablet 0     ferrous sulfate (FEROSUL) 325 (65 Fe) MG tablet Take every other day 60 tablet 0     Fluocinolone Acetonide Scalp (DERMA-SMOOTHE/FS SCALP) 0.01 % OIL oil Apply once daily to the scalp for 4 weeks, then 3 times weekly as neede 60 mL 1     fluticasone (FLOVENT HFA) 110 MCG/ACT inhaler Inhale 1 puff into the lungs 2 times daily 12 g 1     magnesium citrate solution Take as directed. Two days prior to exam drink 10oz bottle of magnesium citrate at 4:00pm 296 mL 0     magnesium citrate solution Take as directed. Two days prior to exam drink 10oz bottle of magnesium citrate at 4:00pm 296 mL 0     pantoprazole (PROTONIX) 40 MG EC tablet Take 1 tablet (40 mg) by mouth daily 90 tablet 0     polyethylene glycol (GOLYTELY) 236 g suspension Take 6,000 mLs by mouth See Admin Instructions --For instructions refer to you colonoscopy prep instructions. 8000 mL 0     polyethylene glycol (GOLYTELY) 236 g suspension Take as directed. One day before your exam fill the first container with water. Cover and shake until mixed well. At 3:00pm drink one 8oz glass every 10-15 minutes until half of the first container is empty. Store the remainder in the refrigerator. At 8:00pm drink the second half of the first container until it is gone. Before you go to bed mix the second container with water and put in refrigerator. Six hours before your check in time drink one 8oz glass  every 10-15 minutes until half of container is empty. Discard the remainder of solution. 8000 mL 0     spironolactone (ALDACTONE) 50 MG tablet Take 1 tablet (50 mg) by mouth daily 30 tablet 2     sucralfate (CARAFATE) 1 GM/10ML suspension Take 10 mLs (1 g) by mouth 4 times daily 414 mL 3     vitamin D2 (ERGOCALCIFEROL) 82573 units (1250 mcg) capsule Take 1 capsule (50,000 Units) by mouth once a week 4 capsule 0       Systemic Review:   CONSTITUTIONAL: NEGATIVE for fever, chills, change in weight  ENT/MOUTH: NEGATIVE for ear, mouth and throat problems  RESP: NEGATIVE for significant cough or SOB  CV: NEGATIVE for chest pain, palpitations or peripheral edema    Physical Examination:   Vital Signs: There were no vitals taken for this visit.  GENERAL: healthy, alert and no distress  NECK: no adenopathy, no asymmetry, masses, or scars  RESP: lungs clear to auscultation - no rales, rhonchi or wheezes  CV: regular rate and rhythm, normal S1 S2, no S3 or S4, no murmur, click or rub, no peripheral edema and peripheral pulses strong  ABDOMEN: soft, nontender, no hepatosplenomegaly, no masses and bowel sounds normal  MS: no gross musculoskeletal defects noted, no edema    ASA: 2    Mallampati Score: 2    Plan: Appropriate to proceed as scheduled.      Ricardo Mayfield MD  8/5/2022    PCP:  Clinic, [facility]

## 2022-08-05 NOTE — DISCHARGE INSTRUCTIONS
Discharge Instructions after  Upper Endoscopy (EGD)    Activity and Diet  You were given medicine for pain. You may be dizzy or sleepy.  For 24 hours:   Do not drive or use heavy equipment.   Do not make important decisions.   Do not drink any alcohol.  ___ You may return to your regular diet.    Discomfort  You may have a sore throat for 2 to 3 days. It may help to:   Avoid hot liquids for 24 hours.   Use sore throat lozenges.   Gargle as needed with salt water up to 4 times a day. Mix 1 cup of warm water  with 1 teaspoon of salt. Do not swallow.  ___ Your esophagus was dilated (opened) or banded during the exam:   Drink only cool liquids for the rest of the day. Eat a soft diet for the next few days.   You may have a sore chest for 2 to 3 days.    You may take Tylenol (acetaminophen) for pain unless your doctor has told you not to.    Do not take aspirin or ibuprofen (Advil, Motrin) or other NSAIDS  (anti-inflammatory drugs) for ___ days.    Follow-up  ___ We took small tissue samples for study. If you do not have a follow-up visit scheduled,  call your provider s office in 2 weeks for the results.    Other instructions________________________________________________________    When to call us:  Problems are rare. Call right away if you have:   Unusual throat pain or trouble swallowing   Unusual pain in belly or chest that is not relieved by belching or passing air   Black stools (tar-like looking bowel movement)   Temperature above 100.6  F. (37.5  C).    If you vomit blood or have severe pain, go to an emergency room.    If you have questions, call:  Monday to Friday, 8 a.m. to 4:30 p.m.: Central Scheduling Department:[phone]    After hours: Hospital: [phone] (Ask for the GI fellow on call)              Discharge Instructions after Colonoscopy  or Sigmoidoscopy    Today you had a ____ Colonoscopy ____ Sigmoidoscopy    Activity and Diet  You were given medicine for pain. You may be dizzy or sleepy.  For  24 hours:   Do not drive or use heavy equipment.   Do not make important decisions.   Do not drink any alcohol.  You may return to your normal diet and medicines.    Discomfort   Air was placed in your colon during the exam in order to see it. Walking helps to pass the air.   You may take Tylenol (acetaminophen) for pain unless your doctor has told you not to.  Do not take aspirin or ibuprofen (Advil, Motrin, or other anti-inflammatory  drugs) for _____ days.    Follow-up  ____ We took small tissue samples or polyps to study. Your doctor will call you with the results  within two weeks.    When to call:    Call right away if you have:   Unusual pain in belly or chest pain not relieved with passing air.   More than 1 to 2 Tablespoons of bleeding from your rectum.   Fever above 100.6  F (37.5  C).    If you have severe pain, bleeding, or shortness of breath, go to an emergency room.    If you have questions, call:  Monday to Friday, 8 a.m. to 4:30 p.m.  Central Scheduling Department: [phone]    After hours  [facility]: [phone] (Ask for the GI fellow on call)

## 2022-08-05 NOTE — ANESTHESIA CARE TRANSFER NOTE
Patient: Peewee Wilson    Procedure: Procedure(s):  COLONOSCOPY, WITH POLYPECTOMY  ESOPHAGOGASTRODUODENOSCOPY, WITH BIOPSY       Diagnosis: Constipation, unspecified constipation type [K59.00]  Diagnosis Additional Information: No value filed.    Anesthesia Type:   No value filed.     Note:    Oropharynx: oropharynx clear of all foreign objects and spontaneously breathing  Level of Consciousness: awake  Oxygen Supplementation: room air    Independent Airway: airway patency satisfactory and stable  Dentition: dentition unchanged  Vital Signs Stable: post-procedure vital signs reviewed and stable  Report to RN Given: handoff report given  Patient transferred to: Phase II    Handoff Report: Identifed the Patient, Identified the Reponsible Provider, Reviewed the pertinent medical history, Discussed the surgical course, Reviewed Intra-OP anesthesia mangement and issues during anesthesia, Set expectations for post-procedure period and Allowed opportunity for questions and acknowledgement of understanding      Vitals:  Vitals Value Taken Time   /73    Temp 98.0    Pulse 98    Resp 12    SpO2 98        Electronically Signed By: WENDY YIN  August 5, 2022  1:52 PM

## 2022-08-05 NOTE — ANESTHESIA PREPROCEDURE EVALUATION
Anesthesia Pre-Procedure Evaluation    Patient: Peewee Wilson   MRN: [id number] : 1990        Procedure : Procedure(s):  COLONOSCOPY  ESOPHAGOGASTRODUODENOSCOPY (EGD)          Past Medical History:   Diagnosis Date     Asthma      Cervical cancer screening     10/7/09 NIL pap, + HR HPV (not 16 or 18)  9/1/10 NIL Pap, Neg HPV 11 NIL pap 7/3/12 ASCUS pap, neg HPV 3/9/16 NIL Pap, Neg HPV. 19 NIL Pap, Neg HPV. Plan: cotest in 5 yr     History of  delivery, currently pregnant 2018     Migraines       Past Surgical History:   Procedure Laterality Date     ABDOMEN SURGERY      2 c-sections     GYN SURGERY      dermoid cyst     TUBAL LIGATION        No Known Allergies   Social History     Tobacco Use     Smoking status: Former Smoker     Smokeless tobacco: Never Used     Tobacco comment: quit 2019 - was smoking 3 cigarettes daily   Substance Use Topics     Alcohol use: No      Wt Readings from Last 1 Encounters:   21 80.3 kg (177 lb)        Anesthesia Evaluation   Pt has had prior anesthetic.     No history of anesthetic complications       ROS/MED HX  ENT/Pulmonary:     (+) asthma     Neurologic:  - neg neurologic ROS     Cardiovascular:  - neg cardiovascular ROS     METS/Exercise Tolerance: >4 METS    Hematologic:  - neg hematologic  ROS     Musculoskeletal:  - neg musculoskeletal ROS     GI/Hepatic:  - neg GI/hepatic ROS     Renal/Genitourinary:  - neg Renal ROS     Endo:  - neg endo ROS     Psychiatric/Substance Use:  - neg psychiatric ROS     Infectious Disease:  - neg infectious disease ROS     Malignancy:  - neg malignancy ROS     Other:  - neg other ROS          Physical Exam    Airway  airway exam normal      Mallampati: I   TM distance: > 3 FB   Neck ROM: full   Mouth opening: > 3 cm    Respiratory Devices and Support         Dental  no notable dental history         Cardiovascular   cardiovascular exam normal       Rhythm and rate: regular and normal     Pulmonary    pulmonary exam normal        breath sounds clear to auscultation           OUTSIDE LABS:  CBC:   Lab Results   Component Value Date    WBC 7.1 05/20/2022    WBC 7.5 07/13/2021    HGB 11.6 (L) 05/20/2022    HGB 12.6 07/13/2021    HCT 35.8 05/20/2022    HCT 38.8 07/13/2021     05/20/2022     07/13/2021     BMP:   Lab Results   Component Value Date     07/13/2021    POTASSIUM 4.0 07/13/2021    CHLORIDE 108 07/13/2021    CO2 25 07/13/2021    BUN 12 07/13/2021    CR 0.67 07/13/2021    GLC 88 07/13/2021     COAGS: No results found for: PTT, INR, FIBR  POC: No results found for: BGM, HCG, HCGS  HEPATIC:   Lab Results   Component Value Date    ALBUMIN 3.9 07/13/2021    PROTTOTAL 8.1 07/13/2021    ALT 25 07/13/2021    AST 13 07/13/2021    ALKPHOS 49 07/13/2021    BILITOTAL 0.4 07/13/2021     OTHER:   Lab Results   Component Value Date    A1C 4.9 01/12/2018    SHAD 9.2 07/13/2021    TSH 0.69 05/20/2022       Anesthesia Plan    ASA Status:  2   NPO Status:  NPO Appropriate    Anesthesia Type: MAC.     - Reason for MAC: Deep or markedly invasive procedure (G8)   Induction: Propofol.   Maintenance: N/A.        Consents    Anesthesia Plan(s) and associated risks, benefits, and realistic alternatives discussed. Questions answered and patient/representative(s) expressed understanding.    - Discussed:     - Discussed with:  Patient    Use of blood products discussed: No .     Postoperative Care            Comments:           H&P reviewed: Unable to attach H&P to encounter due to EHR limitations. H&P Update: appropriate H&P reviewed, patient examined. No interval changes since H&P (within 30 days).         Jerman Melton DO

## 2022-08-05 NOTE — ANESTHESIA POSTPROCEDURE EVALUATION
Patient: Peewee Wilson    Procedure: Procedure(s):  COLONOSCOPY, WITH POLYPECTOMY  ESOPHAGOGASTRODUODENOSCOPY, WITH BIOPSY       Anesthesia Type:  No value filed.    Note:  Disposition: Outpatient   Postop Pain Control: Uneventful            Sign Out: Well controlled pain   PONV: No   Neuro/Psych: Uneventful            Sign Out: Acceptable/Baseline neuro status   Airway/Respiratory: Uneventful            Sign Out: Acceptable/Baseline resp. status   CV/Hemodynamics: Uneventful            Sign Out: Acceptable CV status   Other NRE: NONE   DID A NON-ROUTINE EVENT OCCUR? No           Last vitals:  Vitals Value Taken Time   /78 08/05/22 1415   Temp 36.4  C (97.6  F) 08/05/22 1415   Pulse 84 08/05/22 1415   Resp 16 08/05/22 1415   SpO2 100 % 08/05/22 1415       Electronically Signed By: Jerman Melton DO  August 5, 2022  3:05 PM

## 2022-08-05 NOTE — TELEPHONE ENCOUNTER
Received refill request as ERYN. 50,000U once weekly. Last dermatology clinic note reviewed. Plan was to continue this medication per last clinic note. Limited refill provided to bridge to next clinic visit.     Next clinic visit scheduled for 8/24/22    Routed to attending provider as KRYSTAL.     Lupe London MD  Dermatology Resident

## 2022-08-08 LAB
PATH REPORT.COMMENTS IMP SPEC: NORMAL
PATH REPORT.FINAL DX SPEC: NORMAL
PATH REPORT.GROSS SPEC: NORMAL
PATH REPORT.MICROSCOPIC SPEC OTHER STN: NORMAL
PATH REPORT.RELEVANT HX SPEC: NORMAL
PHOTO IMAGE: NORMAL

## 2022-08-08 PROCEDURE — 88305 TISSUE EXAM BY PATHOLOGIST: CPT | Mod: 26 | Performed by: PATHOLOGY

## 2022-08-11 ENCOUNTER — ANCILLARY PROCEDURE (OUTPATIENT)
Dept: CT IMAGING | Facility: CLINIC | Age: 32
End: 2022-08-11
Attending: INTERNAL MEDICINE
Payer: COMMERCIAL

## 2022-08-11 DIAGNOSIS — R10.84 ABDOMINAL PAIN, GENERALIZED: ICD-10-CM

## 2022-08-11 PROCEDURE — 74177 CT ABD & PELVIS W/CONTRAST: CPT | Performed by: RADIOLOGY

## 2022-08-11 RX ORDER — IOPAMIDOL 755 MG/ML
100 INJECTION, SOLUTION INTRAVASCULAR ONCE
Status: COMPLETED | OUTPATIENT
Start: 2022-08-11 | End: 2022-08-11

## 2022-08-11 RX ADMIN — IOPAMIDOL 100 ML: 755 INJECTION, SOLUTION INTRAVASCULAR at 16:08

## 2022-08-11 NOTE — RESULT ENCOUNTER NOTE
Dear Peewee  Here is your endoscopy report.  I see that you have follow up with gastroenterologist scheduled.  Please call or MyChart my office with any questions or concerns.   Becky Ramirez, PAC

## 2022-08-11 NOTE — RESULT ENCOUNTER NOTE
Dear Peewee  Here are your colonoscopy results  I see that you have follow up with the gastroenterologist scheduled.  Please call or MyChart my office with any questions or concerns.   Becky Ramirez, PAC

## 2022-08-17 NOTE — PROGRESS NOTES
[facility] Dermatology Note  Encounter Date: Aug 24, 2022  Office Visit     Dermatology Problem List:  1. Acne vulgaris  - current tx: spironolactone, differin 0.3% gel  - previous tx: doxycycline 100 mg PO BID, tretinoin 0.025% cream, clindamycin 1% lotion, BP 4-5% wash  2. Hair loss  - Current tx: spironolactone, Rogaine, Dermasmoothe oil, HS royal oils, ILK injections, topical minoxidil  3. Grandma with hair loss and aunt, no first degree relatives with hair loss to her knowledge  No family or personal history of fibroids or breast cancer.   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  - Continue spironolactone 50 mg daily. Patient denies dizziness or spotting. Patient was taking originally from Dr. Catalan.  - Start OTC topical minoxidil  - ILK injections today  - Continue derma-smoothe oil three times per weeks  - Continue HS royal oils  - Not for future:  - Avoid doxycycline at this time due to GI issues     # Low ferritin, started on ferrous sulfate  - seeing GI     # Vitamin D deficiency, started on replacement  - Continue Vitamin D supplementation     Hair labs:  TSH:  0.69  Cbc: low hgb 05/2022, will recheck today  Ferritin:4 (low)  Zinc: 78  Vitamin D: 9 (low) -taking 50,000 international units once weekly.    # Acne vulgaris  - Refilled Differin gel nightly  - Continue spironolactone 50 mg daily     Follow-up: 6-8 weeks. Have 4ccs of kenalog 2.5mg/cc ready in regular clinic room. Will include frontal hairline.    Procedures Performed:   - Intra-lesional triamcinolone procedure note. After verbal consent and review of risk of pain and skin thinning/atrophy, positioning and cleansing with isopropyl alcohol, 3 total mL of triamcinolone 2.5 mg/mL was injected into >7 lesion(s) on the scalp. The patient tolerated the procedure well and left the dermatology clinic in good  condition.    Follow-up: 6-8 week(s) in-person, or earlier for new or changing lesions    Staff and Scribe:     Scribe Disclosure:  I, Donaldo Dick, am serving as a scribe to document services personally performed by Sandie Leo MD based on data collection and the provider's statements to me.     Sandie Leo MD    Department of Dermatology  [facility]: Phone: [phone], Fax:[phone]  [facility]: Phone: [phone], Fax: [phone]      ____________________________________________    CC: Hair Loss (Peewee is here to follow up on hair loss.)    HPI:  Ms. Peewee Wilson is a(n) 32 year old female who presents today as a return patient for hair loss. Last seen by me on 7/27/2022, at which time patient was started on minoxidil 5% foam or solution once daily and derma-smoothe oil 3x weekly for treatment of hair loss consistent with CCCA or AGA. Additionally, patient was started on ferrous sulfate supplement every other day for treatment of low ferritin.    Today, patient reports that she called her insurance 2 days ago, and they stated that they would cover injections. Some soreness on crown and posterior scalp intermittently. Is using head and shoulders royal oils and dermasmooth oil.    Patient is otherwise feeling well, without additional skin concerns.     Labs Reviewed:  N/A    Physical Exam:  Vitals: LMP 07/15/2022 (Approximate)   SKIN: Focused examination of scalp was performed.  - No erythema to the scalp.  - Patches of hair loss with loss of ostia on the crown.  - Thinning along frontal hairline with regrowth.  - No other lesions of concern on areas examined.     Medications:  Current Outpatient Medications   Medication     adapalene (DIFFERIN) 0.3 % external gel     albuterol (PROAIR HFA/PROVENTIL HFA/VENTOLIN HFA) 108 (90 Base) MCG/ACT inhaler     bisacodyl  (DULCOLAX) 5 MG EC tablet     bisacodyl (DULCOLAX) 5 MG EC tablet     famotidine (PEPCID) 10 MG tablet     ferrous sulfate (FE TABS) 325 (65 Fe) MG EC tablet     ferrous sulfate (FEROSUL) 325 (65 Fe) MG tablet     Fluocinolone Acetonide Scalp (DERMA-SMOOTHE/FS SCALP) 0.01 % OIL oil     fluticasone (FLOVENT HFA) 110 MCG/ACT inhaler     magnesium citrate solution     magnesium citrate solution     pantoprazole (PROTONIX) 40 MG EC tablet     polyethylene glycol (GOLYTELY) 236 g suspension     polyethylene glycol (GOLYTELY) 236 g suspension     spironolactone (ALDACTONE) 50 MG tablet     sucralfate (CARAFATE) 1 GM/10ML suspension     vitamin D2 (ERGOCALCIFEROL) 64840 units (1250 mcg) capsule     No current facility-administered medications for this visit.      Past Medical History:   Patient Active Problem List   Diagnosis     Exercise-induced asthma     Mild persistent asthma with acute exacerbation     Past Medical History:   Diagnosis Date     Asthma      Cervical cancer screening     10/7/09 NIL pap, + HR HPV (not 16 or 18)  9/1/10 NIL Pap, Neg HPV 11 NIL pap 7/3/12 ASCUS pap, neg HPV 3/9/16 NIL Pap, Neg HPV. 19 NIL Pap, Neg HPV. Plan: cotest in 5 yr     History of  delivery, currently pregnant 2018     Migraines        CC No referring provider defined for this encounter. on close of this encounter.

## 2022-08-24 ENCOUNTER — OFFICE VISIT (OUTPATIENT)
Dept: DERMATOLOGY | Facility: CLINIC | Age: 32
End: 2022-08-24
Payer: COMMERCIAL

## 2022-08-24 DIAGNOSIS — L65.9 LOSS OF HAIR: ICD-10-CM

## 2022-08-24 DIAGNOSIS — L66.81 CENTRAL CENTRIFUGAL SCARRING ALOPECIA: Primary | ICD-10-CM

## 2022-08-24 DIAGNOSIS — R79.0 LOW FERRITIN: ICD-10-CM

## 2022-08-24 DIAGNOSIS — L70.0 ACNE VULGARIS: ICD-10-CM

## 2022-08-24 PROCEDURE — 11901 INJECT SKIN LESIONS >7: CPT | Performed by: DERMATOLOGY

## 2022-08-24 PROCEDURE — 99214 OFFICE O/P EST MOD 30 MIN: CPT | Mod: 25 | Performed by: DERMATOLOGY

## 2022-08-24 RX ORDER — FLUOCINOLONE ACETONIDE 0.11 MG/ML
OIL TOPICAL
Qty: 60 ML | Refills: 1 | Status: SHIPPED | OUTPATIENT
Start: 2022-08-24 | End: 2023-02-15

## 2022-08-24 RX ORDER — SPIRONOLACTONE 50 MG/1
50 TABLET, FILM COATED ORAL DAILY
Qty: 30 TABLET | Refills: 2 | Status: SHIPPED | OUTPATIENT
Start: 2022-08-24 | End: 2022-08-24

## 2022-08-24 RX ORDER — ERGOCALCIFEROL 1.25 MG/1
50000 CAPSULE, LIQUID FILLED ORAL WEEKLY
Qty: 4 CAPSULE | Refills: 1 | Status: SHIPPED | OUTPATIENT
Start: 2022-08-24 | End: 2023-02-15

## 2022-08-24 RX ORDER — ADAPALENE GEL USP, 0.3% 3 MG/G
GEL TOPICAL
Qty: 45 G | Refills: 11 | Status: SHIPPED | OUTPATIENT
Start: 2022-08-24 | End: 2023-02-15

## 2022-08-24 RX ORDER — SPIRONOLACTONE 50 MG/1
50 TABLET, FILM COATED ORAL DAILY
Qty: 30 TABLET | Refills: 11 | Status: SHIPPED | OUTPATIENT
Start: 2022-08-24 | End: 2022-09-02

## 2022-08-24 RX ORDER — FERROUS SULFATE 325(65) MG
TABLET ORAL
Qty: 60 TABLET | Refills: 0 | Status: SHIPPED | OUTPATIENT
Start: 2022-08-24 | End: 2023-02-15

## 2022-08-24 ASSESSMENT — PAIN SCALES - GENERAL: PAINLEVEL: NO PAIN (0)

## 2022-08-24 NOTE — PATIENT INSTRUCTIONS
[facility] Dermatology Visit    Thank you for allowing us to participate in your care. Your findings, instructions and follow-up plan are as follows:       Intralesional Kenalog (ILK) Injections    Intralesional steroid injection involves a corticosteroid, such as triamcinolone acetonide (brand name Kenalog), which is injected directly into a lesion on or immediately below the skin. Intralesional kenalog may be used to treat many skin conditions:    Alopecia areata  Discoid lupus erythematosus  Keloids/hypertrophic scars  Granuloma annulare and other granulomatous disorders  Hypertrophic lichen planus  Lichen simplex chronicus (neurodermatitis)  Localised psoriasis  Necrobiosis lipoidica  Acne cysts (nodulocystic acne)  Small infantile hemangiomas    Possible side-effects of intralesional Kenalog (ILK) injections include bleeding, pain, skin thinning,infection, contact dermatitis, nerve damage, scar formation and need for a repeat procedure.    Some people may experience delayed side-effects including:  Lipoatrophy, appearing as skin indentations or dimples around the injection sites a few weeks after treatment; these may be permanent.  White marks (leukoderma) or brown marks (postinflammatory pigmentation) at the site of injection or spreading from the site of injection - these may resolve or persist long term.  Telangiectasia, or small dilated blood vessels at the site of injection.   Increased hair growth at the site of injection - this resolves eventually.  Steroid acne: steroids increase growth hormone, leading to increased sebum (oil) production by the sebaceous glands. Steroid acne generally improves once the steroid has been stopped.      Who should I call with questions?  [facility]: [phone]   [facility]: [phone]  For urgent needs outside of business hours call the [facility] at [phone] and ask for the  dermatology resident on call  Spironolactone: Patient drug information     What is this drug used for?   It is used to get rid of extra fluid.   It is used to raise potassium in the body.   It is used to treat heart failure (weak heart).   It is used to treat high blood pressure.   It is used to treat some people with high aldosterone levels.   It is used to treat some kidney problems.   It may be given to you for other reasons. Talk with the doctor.    What do I need to tell my doctor BEFORE I take this drug?   If you have an allergy to spironolactone or any other part of this drug.   If you are allergic to this drug; any part of this drug; or any other drugs, foods, or substances. Tell your doctor about the allergy and what signs you had.   If you have any of these health problems: Renner's disease or high potassium levels.   If you are taking any of these drugs: Amiloride, eplerenone, or triamterene.  This is not a list of all drugs or health problems that interact with this drug.  Tell your doctor and pharmacist about all of your drugs (prescription or OTC, natural products, vitamins) and health problems. You must check to make sure that it is safe for you to take this drug with all of your drugs and health problems. Do not start, stop, or change the dose of any drug without checking with your doctor.    What are some things I need to know or do while I take this drug?   Tell all of your health care providers that you take this drug. This includes your doctors, nurses, pharmacists, and dentists.   Avoid driving and doing other tasks or actions that call for you to be alert until you see how this drug affects you.   Check your blood pressure as you have been told.   Have blood work checked as you have been told by the doctor. Talk with the doctor.   This drug may affect certain lab tests. Tell all of your health care providers and lab workers that you take this drug.   If you are on a low-salt or salt-free diet,  talk with your doctor.   Sometimes, this drug may raise potassium levels in the blood. This can be deadly if it is not treated. The risk is highest in people with diabetes, kidney disease, severe illness, and in older adults. Your doctor will follow you closely to change the dose if needed.   If you are taking a salt substitute that has potassium in it, a potassium-sparing diuretic, or a potassium product, talk with your doctor.   Talk with your doctor before you drink alcohol or use other drugs and natural products that slow your actions.   If you have high blood sugar (diabetes), you will need to watch your blood sugar closely.   Tell your doctor if you are pregnant or plan on getting pregnant. You will need to talk about the benefits and risks of using this drug while you are pregnant.   Tell your doctor if you are breast-feeding. You will need to talk about any risks to your baby.    What are some side effects that I need to call my doctor about right away?  WARNING/CAUTION: Even though it may be rare, some people may have very bad and sometimes deadly side effects when taking a drug. Tell your doctor or get medical help right away if you have any of the following signs or symptoms that may be related to a very bad side effect:   Signs of an allergic reaction, like rash; hives; itching; red, swollen, blistered, or peeling skin with or without fever; wheezing; tightness in the chest or throat; trouble breathing, swallowing, or talking; unusual hoarseness; or swelling of the mouth, face, lips, tongue, or throat.   Signs of fluid and electrolyte problems like mood changes, confusion, muscle pain or weakness, a heartbeat that does not feel normal, very bad dizziness or passing out, fast heartbeat, more thirst, seizures, feeling very tired or weak, not hungry, unable to pass urine or change in the amount of urine produced, dry mouth, dry eyes, or very bad upset stomach or throwing up.   Signs of kidney problems like  unable to pass urine, change in how much urine is passed, blood in the urine, or a big weight gain.   Signs of a very bad skin reaction (Marie-Armando syndrome/toxic epidermal necrolysis) like red, swollen, blistered, or peeling skin (with or without fever); red or irritated eyes; or sores in the mouth, throat, nose, or eyes.   Very bad dizziness or passing out.   Feeling confused.   Change in balance.   Lowered interest in sex.   Not able to get or keep an erection.   Fever or chills.   Sore throat.   Any unexplained bruising or bleeding.   Black, tarry, or bloody stools.   Throwing up blood or throw up that looks like coffee grounds.   Period (menstrual) changes.   Breast pain.   For males, enlarged breasts.   Liver problems have rarely happened with this drug. Sometimes, this has been deadly. Call your doctor right away if you have signs of liver problems like dark urine, feeling tired, not hungry, upset stomach or stomach pain, light-colored stools, throwing up, or yellow skin or eyes.  What are some other side effects of this drug?  All drugs may cause side effects. However, many people have no side effects or only have minor side effects. Call your doctor or get medical help if any of these side effects or any other side effects bother you or do not go away:   Diarrhea.   Feeling sleepy.   Dizziness.   Headache.   Upset stomach or throwing up.   Stomach cramps.   Hair loss.    These are not all of the side effects that may occur. If you have questions about side effects, call your doctor. Call your doctor for medical advice about side effects.  You may report side effects to your national health agency.  How is this drug best taken?  Use this drug as ordered by your doctor. Read all information given to you. Follow all instructions closely.  All products:   Take with or without food but take the same way each time. Always take with food or always take on an empty stomach.   Keep taking this drug as you have  been told by your doctor or other health care provider, even if you feel well.   This drug may cause you to pass urine more often. To keep from having sleep problems, try not to take too close to bedtime.  Liquid (suspension):   Shake well before use.   Measure liquid doses carefully. Use the measuring device that comes with this drug. If there is none, ask the pharmacist for a device to measure this drug.    What do I do if I miss a dose?   Take a missed dose as soon as you think about it.   If it is close to the time for your next dose, skip the missed dose and go back to your normal time.   Do not take 2 doses at the same time or extra doses.  How do I store and/or throw out this drug?   Store at room temperature.   Store in a dry place. Do not store in a bathroom.   Keep all drugs in a safe place. Keep all drugs out of the reach of children and pets.   Throw away unused or  drugs. Do not flush down a toilet or pour down a drain unless you are told to do so. Check with your pharmacist if you have questions about the best way to throw out drugs. There may be drug take-back programs in your area.  General drug facts   If your symptoms or health problems do not get better or if they become worse, call your doctor.   Do not share your drugs with others and do not take anyone else's drugs.   Some drugs may have another patient information leaflet. If you have any questions about this drug, please talk with your doctor, nurse, pharmacist, or other health care provider.   If you think there has been an overdose, call your poison control center or get medical care right away. Be ready to tell or show what was taken, how much, and when it happened.      Use of UpToDate is subject to the Subscription and License Agreement.  Topic 72251 Version 157.0  Close  The use of UpToDate is subject to theSubscription and License Agreement.  Lorena G[employer]iola drug information & [employer] are subject to the [employer] License  Agreement.  Topical Rogaine (Minoxidil) for Pattern Hair Loss    Minoxidil is an FDA approved over the counter topical for the treatment of hair loss and thinning hair in men and women.   Initially a 2% solution was available however this required application twice daily. A 5% solution is also now approved, only requiring application once per day.     Available Products:   Rogaine 5% solution: Packaged for men however can be used by men or women. Use dropper and apply directly to scalp at bedtime. This product can cause an allergy because of presence of propylene glycol. Stop this product if you develop a rash or itching and contact your physician.   Rogaine 5% foam: Packaged for men and women: Apply foam directly to the scalp once daily. This is less greasy compared to the solution. This formula is preferred for those who had a reaction to the solution product. If you develop rash or itching, stop the product and contact your physician.     What if I stop minoxidil topical?   After stopping minoxidil the hair will return to the usual pattern of thinning. Using the product 3-4 times per week is better than not using product at all.       Can I use generic minoxidil?   Yes, look for 5% minoxidil.     What are the side effects?  The most common side effect is rash or itching of the scalp. This can occur if a contact allergy develops with propylene glycol. A small group of patients noticed the appearance of facial hair if the product runs onto the face or with prolonged use. Keep it away from the face.  This can cause temporary shedding. Please, call us if this happens.  This can irritated the scalp. Please, call us if this happens.  Stop this product if you become pregnant or are breastfeeding      Last updated: 11/2/2021       When should I call my doctor?  If you are worsening or not improving, please, contact us or seek urgent care as noted below.     Who should I call with questions (adults)?  [facility]  [facility] (adult and pediatric): [phone]  [facility] (adult): [phone]  For urgent needs outside of business hours call the [facility] at [phone] and ask for the dermatology resident on call  If this is a medical emergency and you are unable to reach an ER, Call 911    Who should I call with questions (pediatric)?  [facility]- Pediatric Dermatology  Dr. Danyelle Qureshi, Dr. Daisy Mijares, Dr. Radha Hill, Leatha Sher, JONATHON Traore, Dr. Marisol Samuel & Dr. Hussain Becker  Non Urgent  Nurse Triage Line; [phone]- [facility] Care Coordinatormaryuri Ojeda (/Complex ) [phone]    If you need a prescription refill, please contact your pharmacy. Refills are approved or denied by our physicians during normal business hours, Monday through Fridays  Per office policy, refills will not be granted if you have not been seen within the past year (or sooner depending on your child's condition).    Scheduling Information:  Pediatric Appointment Scheduling and Call Center [phone]  Radiology Scheduling- [phone]  Sedation Unit Scheduling- [phone]  [place] Scheduling- General [phone]; Pediatric Dermatology [phone]  Main  Services: [phone]  Azeri: [phone]  Romanian: [phone]  Hmong/Albanian/Dontae: [phone]  Preadmission Nursing Department Fax Number: [phone] (fax all pre-operative paperwork to this number)    For urgent matters arising during evenings, weekends, or holidays that cannot wait for normal business hours please call [phone] and ask for the dermatology resident on call to be paged.

## 2022-08-24 NOTE — NURSING NOTE
Dermatology Rooming Note    Peewee Wilson's goals for this visit include:   Chief Complaint   Patient presents with     Hair Loss     Peewee is here to follow up on hair loss.     Sonia Plasencia, Facilitator

## 2022-08-24 NOTE — LETTER
8/24/2022       RE: Peewee Wilson  [address]  [facility] [address]     Dear Colleague,    Thank you for referring your patient, Peewee Wilson, to the [facility] DERMATOLOGY CLINIC [place] at [facility]. Please see a copy of my visit note below.    [facility] Dermatology Note  Encounter Date: Aug 24, 2022  Office Visit     Dermatology Problem List:  1. Acne vulgaris  - current tx: spironolactone, differin 0.3% gel  - previous tx: doxycycline 100 mg PO BID, tretinoin 0.025% cream, clindamycin 1% lotion, BP 4-5% wash  2. Hair loss  - Current tx: spironolactone, Rogaine, Dermasmoothe oil, HS royal oils, ILK injections, topical minoxidil  3. Grandma with hair loss and aunt, no first degree relatives with hair loss to her knowledge  No family or personal history of fibroids or breast cancer.   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  - Continue spironolactone 50 mg daily. Patient denies dizziness or spotting. Patient was taking originally from Dr. Catalan.  - Start OTC topical minoxidil  - ILK injections today  - Continue derma-smoothe oil three times per weeks  - Continue HS royal oils  - Not for future:  - Avoid doxycycline at this time due to GI issues     # Low ferritin, started on ferrous sulfate  - seeing GI     # Vitamin D deficiency, started on replacement  - Continue Vitamin D supplementation     Hair labs:  TSH:  0.69  Cbc: low hgb 05/2022, will recheck today  Ferritin:4 (low)  Zinc: 78  Vitamin D: 9 (low) -taking 50,000 international units once weekly.    # Acne vulgaris  - Refilled Differin gel nightly  - Continue spironolactone 50 mg daily     Follow-up: 6-8 weeks. Have 4ccs of kenalog 2.5mg/cc ready in regular clinic room. Will include frontal hairline.    Procedures Performed:   - Intra-lesional triamcinolone  procedure note. After verbal consent and review of risk of pain and skin thinning/atrophy, positioning and cleansing with isopropyl alcohol, 3 total mL of triamcinolone 2.5 mg/mL was injected into >7 lesion(s) on the scalp. The patient tolerated the procedure well and left the dermatology clinic in good condition.    Follow-up: 6-8 week(s) in-person, or earlier for new or changing lesions    Staff and Scribe:     Scribe Disclosure:  I, Donaldo Dick, am serving as a scribe to document services personally performed by Sandie Leo MD based on data collection and the provider's statements to me.     Sandie Leo MD    Department of Dermatology  [facility]: Phone: [phone], Fax:[phone]  [facility]: Phone: [phone], Fax: [phone]    ____________________________________________    CC: Hair Loss (Peewee is here to follow up on hair loss.)    HPI:  Ms. Peewee Wilson is a(n) 32 year old female who presents today as a return patient for hair loss. Last seen by me on 7/27/2022, at which time patient was started on minoxidil 5% foam or solution once daily and derma-smoothe oil 3x weekly for treatment of hair loss consistent with CCCA or AGA. Additionally, patient was started on ferrous sulfate supplement every other day for treatment of low ferritin.    Today, patient reports that she called her insurance 2 days ago, and they stated that they would cover injections. Some soreness on crown and posterior scalp intermittently. Is using head and shoulders royal oils and dermasmooth oil.    Patient is otherwise feeling well, without additional skin concerns.     Labs Reviewed:  N/A    Physical Exam:  Vitals: LMP 07/15/2022 (Approximate)   SKIN: Focused examination of scalp was performed.  - No erythema to the scalp.  - Patches of hair loss with loss of ostia on the crown.  -  Thinning along frontal hairline with regrowth.  - No other lesions of concern on areas examined.     Medications:  Current Outpatient Medications   Medication     adapalene (DIFFERIN) 0.3 % external gel     albuterol (PROAIR HFA/PROVENTIL HFA/VENTOLIN HFA) 108 (90 Base) MCG/ACT inhaler     bisacodyl (DULCOLAX) 5 MG EC tablet     bisacodyl (DULCOLAX) 5 MG EC tablet     famotidine (PEPCID) 10 MG tablet     ferrous sulfate (FE TABS) 325 (65 Fe) MG EC tablet     ferrous sulfate (FEROSUL) 325 (65 Fe) MG tablet     Fluocinolone Acetonide Scalp (DERMA-SMOOTHE/FS SCALP) 0.01 % OIL oil     fluticasone (FLOVENT HFA) 110 MCG/ACT inhaler     magnesium citrate solution     magnesium citrate solution     pantoprazole (PROTONIX) 40 MG EC tablet     polyethylene glycol (GOLYTELY) 236 g suspension     polyethylene glycol (GOLYTELY) 236 g suspension     spironolactone (ALDACTONE) 50 MG tablet     sucralfate (CARAFATE) 1 GM/10ML suspension     vitamin D2 (ERGOCALCIFEROL) 31960 units (1250 mcg) capsule     No current facility-administered medications for this visit.      Past Medical History:   Patient Active Problem List   Diagnosis     Exercise-induced asthma     Mild persistent asthma with acute exacerbation     Past Medical History:   Diagnosis Date     Asthma      Cervical cancer screening     10/7/09 NIL pap, + HR HPV (not 16 or 18)  9/1/10 NIL Pap, Neg HPV 11 NIL pap 7/3/12 ASCUS pap, neg HPV 3/9/16 NIL Pap, Neg HPV. 19 NIL Pap, Neg HPV. Plan: cotest in 5 yr     History of  delivery, currently pregnant 2018     Migraines      CC No referring provider defined for this encounter. on close of this encounter.

## 2022-08-25 NOTE — NURSING NOTE
Drug Administration Record    Prior to injection, verified patient identity using patient's name and date of birth.  Due to injection administration, patient instructed to remain in clinic for 15 minutes  afterwards, and to report any adverse reaction to me immediately.    Drug Name: triamcinolone acetonide(kenalog)  Dose: 0.75mL of triamcinolone 10mg/mL, 7.5mg dose  Route administered: ID  NDC #: Kenalog-10 (0609-9965-08)  Amount of waste(mL):4.25  Reason for waste: Single use vial    LOT #: [id number]  : [employer]  EXPIRATION DATE: 02/2024

## 2022-08-30 DIAGNOSIS — L70.0 ACNE VULGARIS: ICD-10-CM

## 2022-09-02 RX ORDER — SPIRONOLACTONE 50 MG/1
TABLET, FILM COATED ORAL
Qty: 30 TABLET | Refills: 11 | Status: SHIPPED | OUTPATIENT
Start: 2022-09-02 | End: 2023-02-15

## 2022-09-02 NOTE — TELEPHONE ENCOUNTER
pharm transfer:  spironolactone (ALDACTONE) 50 MG tablet 30 tablet   11 8/24/2022  No  Sig - Route: Take 1 tablet (50 mg) by mouth daily - Oral  Prescribing Provider's NPI: [id number]  Sandie Leo

## 2022-09-11 ENCOUNTER — HEALTH MAINTENANCE LETTER (OUTPATIENT)
Age: 32
End: 2022-09-11

## 2022-10-04 ENCOUNTER — VIRTUAL VISIT (OUTPATIENT)
Dept: GASTROENTEROLOGY | Facility: CLINIC | Age: 32
End: 2022-10-04
Attending: PHYSICIAN ASSISTANT
Payer: COMMERCIAL

## 2022-10-04 VITALS — WEIGHT: 170 LBS | BODY MASS INDEX: 27.44 KG/M2

## 2022-10-04 DIAGNOSIS — K59.00 CONSTIPATION, UNSPECIFIED CONSTIPATION TYPE: ICD-10-CM

## 2022-10-04 DIAGNOSIS — A04.8 H. PYLORI INFECTION: Primary | ICD-10-CM

## 2022-10-04 DIAGNOSIS — K21.9 GASTROESOPHAGEAL REFLUX DISEASE, UNSPECIFIED WHETHER ESOPHAGITIS PRESENT: ICD-10-CM

## 2022-10-04 DIAGNOSIS — D12.5 ADENOMATOUS POLYP OF SIGMOID COLON: ICD-10-CM

## 2022-10-04 PROCEDURE — 99204 OFFICE O/P NEW MOD 45 MIN: CPT | Mod: 95 | Performed by: PHYSICIAN ASSISTANT

## 2022-10-04 ASSESSMENT — PAIN SCALES - GENERAL: PAINLEVEL: NO PAIN (0)

## 2022-10-04 NOTE — PROGRESS NOTES
Peewee is a 32 year old who is being evaluated via a billable video visit.      How would you like to obtain your AVS? MyChart  If the video visit is dropped, the invitation should be resent by: Text to cell phone: [phone]  Will anyone else be joining your video visit? No          GASTROENTEROLOGY NEW PATIENT VIDEO VISIT     Video Start Time: 1:04 PM    CC/REFERRING MD:    [facility]  Becky Ramirez    REASON FOR CONSULTATION:   Referred by Becky Ramirez for Consult      HISTORY OF PRESENT ILLNESS:    Peewee Wilson is 32 year old female who presents for GI consult.     She was referred for concerns with gerd and constipation. In the past she had been able to control reflux with otc remedies. Earlier this year however her upper GI symptoms led her to the ED with worsening epigastric abdominal pain. She was given protonix and carafate which helped. She was further evaluated with an EGD by Dr. Mayfield in August 2022 which was positive for h pylori on gastric biopsy. Remainder of EGD was unremarkable. She was recommended to follow up with Med Therapy Mgmt pharmacist but never heard back afterwards.    In regards to constipation, she was experiencing abdominal pain with her bowel movement and described only having a BM every 3 days. There was no blood in stool. Her colonoscopy noted a tortuous colon and found a precancerous sigmoid polyp and benign rectal polyp. It was recommended to have a repeat screening colonoscopy in 5 years.     She also had a CT abd/pelvis for further evaluation of her abdominal pain which noted incidental findings of liver cysts but was otherwise negative for any acute pathology.     Overall she is feeling okay at this time. She still has reflux symptoms and is taking protonix and Carafate as needed. Continues to have constipation, with BMs only every few days but admits that she is not on a bowel regimen currently.       Peewee  has  a past medical history of Asthma, Cervical cancer screening, History of  delivery, currently pregnant (2018), and Migraines.    She  has a past surgical history that includes GYN surgery; Abdomen surgery; tubal ligation; Colonoscopy (N/A, 2022); and Esophagoscopy, gastroscopy, duodenoscopy (EGD), combined (N/A, 2022).    She  reports that she has quit smoking. She has never used smokeless tobacco. She reports that she does not drink alcohol and does not use drugs.    Her family history includes Diabetes in an other family member; No Known Problems in her brother, brother, father, mother, sister, sister, sister, and sister.    ALLERGIES:  Patient has no known allergies.      PREVIOUS ENDOSCOPY:    UPPER ENDOSCOPY 22  Impression:     - Normal esophagus.                          - Esophagogastric landmarks identified.                          - Normal mucosa was found in the entire stomach. Biopsied.                          - Normal examined duodenum. Biopsied.     COLONOSCOPY 22   Impression:     - One 4 mm polyp in the rectum, removed with a cold                          snare. Resected and retrieved.                          - One 8 mm polyp in the mid sigmoid colon, removed                          with a cold snare. Resected and retrieved.                          - Tortuous colon.                          - The examined portion of the ileum was normal.     A. DUODENAL BIOPSIES:  - Duodenal mucosa with intact villous architecture and no significant histologic abnormality  - Negative for celiac sprue and peptic duodenitis    B. GASTRIC BIOPSIES:  - Moderate chronic active gastritis  - H. pylori-like organisms are present on routine stain (POSITIVE)  - Negative for intestinal metaplasia and dysplasia     C.  SIGMOID COLON POLYP, POLYPECTOMY:  - Tubular adenoma  - Negative for high grade dysplasia     D. RECTAL POLYP, POLYPECTOMY:  - Rectal mucosa with surface hyperplastic change and  lymphoid aggregates  - Negative for dysplasia      PERTINENT MEDICATIONS:    Current Outpatient Medications:      adapalene (DIFFERIN) 0.3 % external gel, Apply a pea-sized amount evenly over the face at nighttime before bed., Disp: 45 g, Rfl: 11     albuterol (PROAIR HFA/PROVENTIL HFA/VENTOLIN HFA) 108 (90 Base) MCG/ACT inhaler, Inhale 2 puffs into the lungs every 4 hours as needed for shortness of breath / dyspnea or wheezing, Disp: 18 g, Rfl: 0     famotidine (PEPCID) 10 MG tablet, Take 10 mg by mouth 2 times daily, Disp: , Rfl:      ferrous sulfate (FEROSUL) 325 (65 Fe) MG tablet, Take every other day, Disp: 60 tablet, Rfl: 0     Fluocinolone Acetonide Scalp (DERMA-SMOOTHE/FS SCALP) 0.01 % OIL oil, Apply once daily to the scalp for 4 weeks, then 3 times weekly as neede, Disp: 60 mL, Rfl: 1     fluticasone (FLOVENT HFA) 110 MCG/ACT inhaler, Inhale 1 puff into the lungs 2 times daily, Disp: 12 g, Rfl: 1     magnesium citrate solution, Take as directed. Two days prior to exam drink 10oz bottle of magnesium citrate at 4:00pm, Disp: 296 mL, Rfl: 0     magnesium citrate solution, Take as directed. Two days prior to exam drink 10oz bottle of magnesium citrate at 4:00pm, Disp: 296 mL, Rfl: 0     pantoprazole (PROTONIX) 40 MG EC tablet, Take 1 tablet (40 mg) by mouth daily, Disp: 90 tablet, Rfl: 0     polyethylene glycol (GOLYTELY) 236 g suspension, Take 6,000 mLs by mouth See Admin Instructions --For instructions refer to you colonoscopy prep instructions., Disp: 8000 mL, Rfl: 0     spironolactone (ALDACTONE) 50 MG tablet, TAKE 1 TABLET BY MOUTH ONCE DAILY, Disp: 30 tablet, Rfl: 11     sucralfate (CARAFATE) 1 GM/10ML suspension, Take 10 mLs (1 g) by mouth 4 times daily, Disp: 414 mL, Rfl: 3     vitamin D2 (ERGOCALCIFEROL) 75354 units (1250 mcg) capsule, Take 1 capsule (50,000 Units) by mouth once a week, Disp: 4 capsule, Rfl: 1        PHYSICAL EXAMINATION:  Constitutional: aaox3, cooperative, pleasant, not  dyspneic/diaphoretic, no acute distress      GENERAL: Healthy, alert and no distress  EYES: Eyes grossly normal to inspection.  No discharge or erythema, or obvious scleral/conjunctival abnormalities.  RESP: No audible wheeze, cough, or visible cyanosis.  No visible retractions or increased work of breathing.    SKIN: Visible skin clear. No significant rash, abnormal pigmentation or lesions.  NEURO: Cranial nerves grossly intact.  Mentation and speech appropriate for age.  PSYCH: Mentation appears normal, affect normal/bright, judgement and insight intact, normal speech and appearance well-groomed.          PERTINENT STUDIES: (I personally reviewed these laboratory studies today)  Most recent CBC:   Recent Labs   Lab Test 05/20/22  1531 07/13/21  0925   WBC 7.1 7.5   HGB 11.6* 12.6   HCT 35.8 38.8    295     Most recent hepatic panel:  Recent Labs   Lab Test 07/13/21  0925   ALT 25   AST 13     Most recent creatinine:  Recent Labs   Lab Test 07/13/21  0925   CR 0.67       TSH   Date Value Ref Range Status   05/20/2022 0.69 0.40 - 4.00 mU/L Final   03/24/2020 0.83 0.40 - 4.00 mU/L Final         RADIOLOGY:    CT abdomen and pelvis with contrast 8/11/22     INDICATION: Abdominal pain     Contrast: 100 mL intravenous-370     FINDINGS: No comparison scans. The included lower chest shows no  dominant pulmonary nodule or consolidation. No pleural or pericardial  effusion.  Within the abdomen and pelvis comment the gallbladder, spleen,  pancreas, bilateral adrenal glands and bilateral kidneys appear  normal. Subcentimeter liver cysts or benign biliary hamartomas are  noted in the right lobe posteriorly.  There is a small fatty umbilical hernia. Uterus appears grossly  unremarkable. No definitive adnexal mass. No free fluid. No free air.  No enlarged mesenteric, retroperitoneal, iliac chain, deep pelvic or  other lymphadenopathy. There is some fluid-filled small bowel  especially in the jejunal portion which may  indicate mild enteritis.  No evidence of distended bowel. No other masses.  Bones appear well-mineralized with no suspicious lesion.                                                                      IMPRESSION: Incidental subcentimeter liver cysts or benign biliary  hamartomas. Small fatty umbilical hernia. Mild small bowel enteritis.     NAYAN KINGSTON MD          ASSESSMENT/PLAN:    1. H. pylori infection  - Med Therapy Management Referral  2. Gastroesophageal reflux disease, unspecified whether esophagitis present  3. Constipation, unspecified constipation type  4. Adenomatous polyp of sigmoid colon    Peewee Wilson is a 32 year old female who presents for GI consult.    She has history of reflux but specifically noted worsening symptoms several months ago.  An upper endoscopy in August 2022 was positive for H. pylori infection.  This has not been treated as of yet.  Will refer to [facility] pharmacist to review treatment options and to ensure eradication.    In regards to constipation this is likely related to her tortuous colon.  Recommended a diet high in fiber or fiber supplementation.  Also recommended taking MiraLAX 1 capful daily as needed.  She was also found to have a precancerous polyp on her colonoscopy.  Recommended having a repeat screening in 5 years.  I did review alarming symptoms with her such as unintentional weight loss and rectal bleeding which may indicate need for a repeat colonoscopy sooner.  She verbalized understanding.        Return in about 3 months (around 1/4/2023) for Follow up.      Thank you for this consultation.  It was a pleasure to participate in the care of this patient; please contact us with any further questions.        This note was created with voice recognition software, and while reviewed for accuracy, typos may remain.       45 minutes spent on the date of the encounter doing chart review, history and exam, documentation and further activities per the gabe Lima  PEG Rangel  Gastroenterology  [facility]       Video-Visit Details    Type of service:  Video Visit    Video End Time:1:20 PM    Originating Location (pt. Location): Home    Distant Location (provider location):  [facility]     Platform used for Video Visit: Leatha

## 2022-10-04 NOTE — PATIENT INSTRUCTIONS
It was a pleasure visiting with you today.     Your recent upper endoscopy showed positive for H. pylori infection. I am referring you to med therapy management pharmacist for treatment of your H. pylori infection.  Several weeks after treatment we will have to test for eradication with a stool study.    Your colonoscopy showed a tortuous colon and polyps.  One of your polyps was considered to be precancerous and we therefore recommend repeating a screening colonoscopy in 5 years.    In regards to your constipation this may be related to your tortuous colon.  I recommend a high-fiber diet or taking a fiber supplement such as Citrucel or Metamucil.  Please also consider taking MiraLAX 1 capful daily for your constipation.    Follow-up in 3 months, sooner if needed.    Janie Rangel PA-C  Division of Gastroenterology, Hepatology and Nutrition   [facility]

## 2022-10-10 ENCOUNTER — VIRTUAL VISIT (OUTPATIENT)
Dept: PHARMACY | Facility: CLINIC | Age: 32
End: 2022-10-10
Payer: COMMERCIAL

## 2022-10-10 DIAGNOSIS — Z78.9 TAKES DIETARY SUPPLEMENTS: ICD-10-CM

## 2022-10-10 DIAGNOSIS — L66.81 CENTRAL CENTRIFUGAL SCARRING ALOPECIA: ICD-10-CM

## 2022-10-10 DIAGNOSIS — A04.8 H. PYLORI INFECTION: Primary | ICD-10-CM

## 2022-10-10 DIAGNOSIS — L70.0 ACNE VULGARIS: ICD-10-CM

## 2022-10-10 DIAGNOSIS — J45.31 MILD PERSISTENT ASTHMA WITH ACUTE EXACERBATION: ICD-10-CM

## 2022-10-10 PROCEDURE — 99607 MTMS BY PHARM ADDL 15 MIN: CPT | Performed by: PHARMACIST

## 2022-10-10 PROCEDURE — 99605 MTMS BY PHARM NP 15 MIN: CPT | Performed by: PHARMACIST

## 2022-10-10 RX ORDER — METRONIDAZOLE 250 MG/1
250 TABLET ORAL 4 TIMES DAILY
Qty: 56 TABLET | Refills: 0 | Status: SHIPPED | OUTPATIENT
Start: 2022-10-10 | End: 2022-10-10

## 2022-10-10 RX ORDER — BISMUTH SUBSALICYLATE 262 MG/1
2 TABLET, CHEWABLE ORAL
Qty: 112 TABLET | Refills: 0 | Status: SHIPPED | OUTPATIENT
Start: 2022-10-10 | End: 2022-12-19

## 2022-10-10 RX ORDER — DOXYCYCLINE HYCLATE 100 MG
100 TABLET ORAL 2 TIMES DAILY
Qty: 28 TABLET | Refills: 0 | Status: SHIPPED | OUTPATIENT
Start: 2022-10-10 | End: 2022-10-10

## 2022-10-10 RX ORDER — METRONIDAZOLE 250 MG/1
250 TABLET ORAL 4 TIMES DAILY
Qty: 56 TABLET | Refills: 0 | Status: SHIPPED | OUTPATIENT
Start: 2022-10-10 | End: 2022-12-19

## 2022-10-10 RX ORDER — BISMUTH SUBSALICYLATE 262 MG/1
2 TABLET, CHEWABLE ORAL
Qty: 112 TABLET | Refills: 0 | Status: SHIPPED | OUTPATIENT
Start: 2022-10-10 | End: 2022-10-10

## 2022-10-10 RX ORDER — DOXYCYCLINE HYCLATE 100 MG
100 TABLET ORAL 2 TIMES DAILY
Qty: 28 TABLET | Refills: 0 | Status: SHIPPED | OUTPATIENT
Start: 2022-10-10 | End: 2022-10-19

## 2022-10-10 NOTE — PROGRESS NOTES
Medication Therapy Management (MTM) Encounter    ASSESSMENT:                            Medication Adherence/Access: No issues identified    H pylori: Peewee would benefit from H pylori treatment. Would recommend bismuth quadruple therapy given current predicted efficacy which we reviewed today. Of note, it does not look like tetracycline is covered by her insurance, so we will plan for doxycycline instead. No major drug-drug interactions with current therapy and regimen, other than the supplement which we discussed as below. Peewee would benefit from eradication testing at least four weeks after treatment completion.    Seasonal Asthma: Reviewed recommendation to either brush teeth after fluticasone use or rinse her mouth to minimize risk of developing thrush.    Supplements: Stable. Reviewed timing of tetracycline derivatives and ferrous sulfate.    Acne / Hair Loss: Unchanged. Follow-up in place with dermatology next week.    PLAN:                            1. Recommend bismuth quadruple therapy x 14 days for treatment of H pylori:   -- omeprazole 20 mg by mouth daily   -- Pepto bismol 524 mg by mouth four times   -- metronidazole 250 mg by mouth four times    -- doxycycline 100 mg by mouth twice daily     -- Sent to [facility] per patient preference per CPA with Africa Rangel PA-C. Of note, initial orders did not transmit. Attempted again and they did not transmit. Contacted the pharmacy to provide verbal orders. Spoke with pharmacist and provided verbal orders, also notified info is available in Epic.    2. Rinse mouth after using your inhaler (fluticasone) to reduce the likelihood of thrush    Follow-up:    -- 7-10 days for check-in on treatment   -- at least four weeks after treatment for eradication testing    SUBJECTIVE/OBJECTIVE:                          Peewee Wilson is a 32 year old female called for an initial visit. She was referred to me from Janie Rangel PA-C.      Reason for visit: H  pylori     Allergies/ADRs: Reviewed in chart  Tobacco: She reports that she has quit smoking. She has never used smokeless tobacco.  Alcohol: 1-3 beverages / week    Medication Adherence/Access: no issues reported    H pylori:   Famotidine 10 mg twice daily as needed   Pantoprazole (not taking)  Sucralfate as needed (not doing that right now)    Notes she hasn't needed to use the famotidine. She is not using the sucralfate right now either, nor the pantoprazole. She denies history of H pylori treatment. Negative HCG from 8/5/2022. Denies concerns for pregnancy and she is not breastfeeding at this time. Confirms she does not have any medication allergies. She also denies recent antibiotic exposure.    We reviewed H pylori today including background information, indications for treatment and potential modes of transmission. We also discussed potential for antibiotic resistance and importance of finishing treatment if able, as well as follow-up testing. Discussed bismuth quadruple therapy today including medications, dosing, side effects, precautions and general counseling information. Discussed the potential for bismuth to cause dark tongue/stools and the potential interaction between metronidazole and alcohol. Reviewed spacing of supplements from doxycycline therapy. We also reviewed various types of drug rashes and how to triage these, given number of antibiotic therapies.     Seasonal Asthma:   Albuterol HFA as needed   Fluticasone HFA twice daily     Notes that she has seasonal asthma. Notes she does not rinse her mouth out after using the fluticasone inhaler. She does have a history of thrush. We discussed the potential connection between these things today, which she states she has never heard before. Notes her asthma is just seasonal, depending on pollen/temperature etc.    Supplements:   Ferrous sulfate every other day  Vitamin D 50,000 units weekly    No reported side effects or concerns.    Acne / Hair  Loss:  Spironolactone 50 mg daily  Fluocinolone scalp oil daily   Adapalene 0.3% gel to the face at bedtime    No reported questions or concerns. Followed by dermatology.  ----------------    I spent 20 minutes with this patient today. All changes were made via collaborative practice agreement with Janie Rangel. A copy of the visit note was provided to the patient's provider(s).    The patient was sent via Guzu a summary of these recommendations.     Jessica JimenezD, BCACP  MTM Pharmacist   [facility]   Phone: [phone]    Telemedicine Visit Details  Type of service:  Telephone visit  Start Time: 1:10 PM  End Time: 1:31 PM  Originating Location (patient location): Home  Distant Location (provider location):  [facility] SPECIALTY MTM    Peewee had not arrived at the 10 minute raghu for our schedule video visit, so I called to check-in. She was agreeable to having the appointment over the phone.     Medication Therapy Recommendations  H. pylori infection    Rationale: Untreated condition - Needs additional medication therapy - Indication   Recommendation: Start Medication - Pepto-Bismol 262 MG Chew   Status: Accepted per CPA   Note: Recommend bismuth quadruple therapy x 14 days for treatment of H pylori         Mild persistent asthma with acute exacerbation    Current Medication: fluticasone (FLOVENT HFA) 110 MCG/ACT inhaler   Rationale: Incorrect administration - Adverse medication event - Safety   Recommendation: Provide Education   Status: Patient Agreed - Adherence/Education   Note: Recommend rinsing mouth / brushing teeth after use of corticosteroid inhalers.

## 2022-10-10 NOTE — PATIENT INSTRUCTIONS
"Recommendations from today's MTM visit:                                                    MTM (medication therapy management) is a service provided by a clinical pharmacist designed to help you get the most of out of your medicines.   Today we reviewed what your medicines are for, how to know if they are working, that your medicines are safe and how to make your medicine regimen as easy as possible.      1. Recommend bismuth quadruple therapy x 14 days for treatment of H pylori:   -- omeprazole 20 mg by mouth daily   -- Pepto bismol 524 mg by mouth four times   -- metronidazole 250 mg by mouth four times    -- doxycycline 100 mg by mouth twice daily     -- Orders given to [facility]     2. Rinse mouth after using your inhaler (fluticasone) to reduce the likelihood of thrush    Follow-up:    -- 7-10 days for check-in on treatment   -- at least four weeks after treatment for eradication testing    It was great speaking with you today.  I value your experience and would be very thankful for your time in providing feedback in our clinic survey. In the next few days, you may receive an email or text message from [employer] with a link to a survey related to your  clinical pharmacist.\"     To schedule another MTM appointment, please call the clinic directly or you may call the MTM scheduling line at [phone] or toll-free at [phone].     My Clinical Pharmacist's contact information:                                                      Please feel free to contact me with any questions or concerns you have.      Jessica Mehta PharmD, BCACP  MTM Pharmacist   [facility]   Phone: [phone]    "

## 2022-10-12 NOTE — PROGRESS NOTES
[facility] Dermatology Note  Encounter Date: Oct 19, 2022  Office Visit     Dermatology Problem List:  1. Acne vulgaris  - current tx: spironolactone, differin 0.3% gel  - previous tx: doxycycline 100 mg PO BID, tretinoin 0.025% cream, clindamycin 1% lotion, BP 4-5% wash  2. Hair loss  - Current tx: spironolactone, Rogaine, Dermasmoothe oil, HS royal oils, ILK injections, topical minoxidil, doxycyline hyclate  3. Grandma with hair loss and aunt, no first degree relatives with hair loss to her knowledge  No family or personal history of fibroids or breast cancer.     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  - Continue spironolactone 50 mg daily. Patient denies dizziness or spotting. Patient was taking originally from Dr. Catalan. Last BMP normal.  - Continue OTC topical minoxidil  - ILK injections today, see procedure below  - Continue derma-smoothe oil three times per weeks  - Continue HS royal oils  -Start doxycycline 100 mg BID. Recommend that patient try tocalcium-containing products around the time of taking the medication.  Instructed to take with a full glass of fluid and food, not lying down.  Side effects of photosensitivity, headaches, GI upset discussed. Recommend sun protection. Has tubal ligation.       # Low ferritin, started on ferrous sulfate  - seeing GI     # Vitamin D deficiency, started on replacement  - Continue Vitamin D supplementation     # Acne vulgaris  - Refilled Differin gel nightly  - Continue spironolactone 50 mg daily    Procedures Performed:   - Intra-lesional triamcinolone procedure note. After verbal consent and review of risk of pain and skin thinning/atrophy, positioning and cleansing with isopropyl alcohol, 1.7 total mL of triamcinolone 2.5 mg/mL was injected as a grid into 25 area(s) on the scalp. The patient tolerated the procedure well and left the  dermatology clinic in good condition.    Follow-up: 3 month(s) in-person, Plan for injections at next visit    Staff and Scribe:     Scribe Disclosure:  I, Donaldo Dick, am serving as a scribe to document services personally performed by Sandie Leo MD based on data collection and the provider's statements to me.   I, Adela Nails, am serving as a scribe to document services personally performed by Sandie eLo MD based on data collection and the provider's statements to me.     Provider Disclosure:   The documentation recorded by the scribe accurately reflects the services I personally performed and the decisions made by me.    Sandie Leo MD    Department of Dermatology  [facility]: Phone: [phone], Fax:[phone]  [facility]: Phone: [phone], Fax: [phone]      ____________________________________________    CC: Hair Loss (Peewee is here for ILK injections for hair loss.)    HPI:  Ms. Peewee Wilson is a(n) 32 year old female who presents today as a return patient for hair loss follow-up. Last seen by me on 8/24/2022, at which time patient was started on topical minoxidil and underwent ILK injections for treatment of hair loss.    Today, patient reports that she has been taking doxycyline 100 mg BID for H. Pylori. She has also been using Rogaine.    Patient is otherwise feeling well, without additional skin concerns.    Labs Reviewed:  N/A    Physical Exam:  Vitals: There were no vitals taken for this visit.  SKIN: Focused examination of scalp was performed.  - At least 1 cm along frontal hairline including 's peal.  - Posterior scalp, hair regrowth on crown.  - Posterior scalp, scarring and loss of follicular ostia.  - No other lesions of concern on areas examined.     Medications:  Current Outpatient Medications   Medication     adapalene (DIFFERIN) 0.3 %  external gel     albuterol (PROAIR HFA/PROVENTIL HFA/VENTOLIN HFA) 108 (90 Base) MCG/ACT inhaler     bismuth subsalicylate (PEPTO BISMOL) 262 MG chewable tablet     doxycycline hyclate (VIBRA-TABS) 100 MG tablet     famotidine (PEPCID) 10 MG tablet     ferrous sulfate (FEROSUL) 325 (65 Fe) MG tablet     Fluocinolone Acetonide Scalp (DERMA-SMOOTHE/FS SCALP) 0.01 % OIL oil     fluticasone (FLOVENT HFA) 110 MCG/ACT inhaler     metroNIDAZOLE (FLAGYL) 250 MG tablet     omeprazole (PRILOSEC) 20 MG DR capsule     spironolactone (ALDACTONE) 50 MG tablet     sucralfate (CARAFATE) 1 GM/10ML suspension     vitamin D2 (ERGOCALCIFEROL) 36628 units (1250 mcg) capsule     No current facility-administered medications for this visit.      Past Medical History:   Patient Active Problem List   Diagnosis     Exercise-induced asthma     Mild persistent asthma with acute exacerbation     Central centrifugal scarring alopecia     Past Medical History:   Diagnosis Date     Asthma      Cervical cancer screening     10/7/09 NIL pap, + HR HPV (not 16 or 18)  9/1/10 NIL Pap, Neg HPV 11 NIL pap 7/3/12 ASCUS pap, neg HPV 3/9/16 NIL Pap, Neg HPV. 19 NIL Pap, Neg HPV. Plan: cotest in 5 yr     History of  delivery, currently pregnant 2018     Migraines         CC No referring provider defined for this encounter. on close of this encounter.

## 2022-10-19 ENCOUNTER — OFFICE VISIT (OUTPATIENT)
Dept: DERMATOLOGY | Facility: CLINIC | Age: 32
End: 2022-10-19
Payer: COMMERCIAL

## 2022-10-19 DIAGNOSIS — L66.81 CENTRAL CENTRIFUGAL SCARRING ALOPECIA: Primary | ICD-10-CM

## 2022-10-19 DIAGNOSIS — L65.9 LOSS OF HAIR: ICD-10-CM

## 2022-10-19 DIAGNOSIS — A04.8 H. PYLORI INFECTION: ICD-10-CM

## 2022-10-19 PROCEDURE — 99214 OFFICE O/P EST MOD 30 MIN: CPT | Mod: 25 | Performed by: DERMATOLOGY

## 2022-10-19 PROCEDURE — 11901 INJECT SKIN LESIONS >7: CPT | Performed by: DERMATOLOGY

## 2022-10-19 RX ORDER — DOXYCYCLINE HYCLATE 100 MG
100 TABLET ORAL 2 TIMES DAILY
Qty: 120 TABLET | Refills: 0 | Status: SHIPPED | OUTPATIENT
Start: 2022-10-19 | End: 2023-02-15

## 2022-10-19 ASSESSMENT — PAIN SCALES - GENERAL: PAINLEVEL: NO PAIN (0)

## 2022-10-19 NOTE — NURSING NOTE
Dermatology Rooming Note    Peewee Wilson's goals for this visit include:   Chief Complaint   Patient presents with     Hair Loss     Peewee is here for ILK injections for hair loss.     Sonia Plasencia, Facilitator

## 2022-10-19 NOTE — LETTER
10/19/2022       RE: Peewee Wilson  [address]  [place] [address]     Dear Colleague,    Thank you for referring your patient, Peewee Wilson, to the [facility] [place] [facility]. Please see a copy of my visit note below.    [facility] Dermatology Note  Encounter Date: Oct 19, 2022  Office Visit     Dermatology Problem List:  1. Acne vulgaris  - current tx: spironolactone, differin 0.3% gel  - previous tx: doxycycline 100 mg PO BID, tretinoin 0.025% cream, clindamycin 1% lotion, BP 4-5% wash  2. Hair loss  - Current tx: spironolactone, Rogaine, Dermasmoothe oil, HS royal oils, ILK injections, topical minoxidil, doxycyline hyclate  3. Grandma with hair loss and aunt, no first degree relatives with hair loss to her knowledge  No family or personal history of fibroids or breast cancer.     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  - Continue spironolactone 50 mg daily. Patient denies dizziness or spotting. Patient was taking originally from Dr. Catalan. Last BMP normal.  - Continue OTC topical minoxidil  - ILK injections today, see procedure below  - Continue derma-smoothe oil three times per weeks  - Continue HS royal oils  -Start doxycycline 100 mg BID. Recommend that patient try tocalcium-containing products around the time of taking the medication.  Instructed to take with a full glass of fluid and food, not lying down.  Side effects of photosensitivity, headaches, GI upset discussed. Recommend sun protection. Has tubal ligation.       # Low ferritin, started on ferrous sulfate  - seeing GI     # Vitamin D deficiency, started on replacement  - Continue Vitamin D supplementation     # Acne vulgaris  - Refilled Differin gel nightly  - Continue spironolactone 50 mg daily    Procedures Performed:   -  Intra-lesional triamcinolone procedure note. After verbal consent and review of risk of pain and skin thinning/atrophy, positioning and cleansing with isopropyl alcohol, 1.7 total mL of triamcinolone 2.5 mg/mL was injected as a grid into 25 area(s) on the scalp. The patient tolerated the procedure well and left the dermatology clinic in good condition.    Follow-up: 3 month(s) in-person, Plan for injections at next visit    Staff and Scribe:     Scribe Disclosure:  IDonaldo, am serving as a scribe to document services personally performed by Sandie Leo MD based on data collection and the provider's statements to me.   IAdela, am serving as a scribe to document services personally performed by Sandie Leo MD based on data collection and the provider's statements to me.     Provider Disclosure:   The documentation recorded by the scribe accurately reflects the services I personally performed and the decisions made by me.    Sandie Leo MD    Department of Dermatology  [facility]: Phone: [phone], Fax:[phone]  [facility]: Phone: [phone], Fax: [phone]      ____________________________________________    CC: Hair Loss (Peewee is here for ILK injections for hair loss.)    HPI:  Ms. Peewee Wilson is a(n) 32 year old female who presents today as a return patient for hair loss follow-up. Last seen by me on 8/24/2022, at which time patient was started on topical minoxidil and underwent ILK injections for treatment of hair loss.    Today, patient reports that she has been taking doxycyline 100 mg BID for H. Pylori. She has also been using Rogaine.    Patient is otherwise feeling well, without additional skin concerns.    Labs Reviewed:  N/A    Physical Exam:  Vitals: There were no vitals taken for this visit.  SKIN: Focused examination of scalp was  performed.  - At least 1 cm along frontal hairline including 's peal.  - Posterior scalp, hair regrowth on crown.  - Posterior scalp, scarring and loss of follicular ostia.  - No other lesions of concern on areas examined.     Medications:  Current Outpatient Medications   Medication     adapalene (DIFFERIN) 0.3 % external gel     albuterol (PROAIR HFA/PROVENTIL HFA/VENTOLIN HFA) 108 (90 Base) MCG/ACT inhaler     bismuth subsalicylate (PEPTO BISMOL) 262 MG chewable tablet     doxycycline hyclate (VIBRA-TABS) 100 MG tablet     famotidine (PEPCID) 10 MG tablet     ferrous sulfate (FEROSUL) 325 (65 Fe) MG tablet     Fluocinolone Acetonide Scalp (DERMA-SMOOTHE/FS SCALP) 0.01 % OIL oil     fluticasone (FLOVENT HFA) 110 MCG/ACT inhaler     metroNIDAZOLE (FLAGYL) 250 MG tablet     omeprazole (PRILOSEC) 20 MG DR capsule     spironolactone (ALDACTONE) 50 MG tablet     sucralfate (CARAFATE) 1 GM/10ML suspension     vitamin D2 (ERGOCALCIFEROL) 67394 units (1250 mcg) capsule     No current facility-administered medications for this visit.      Past Medical History:   Patient Active Problem List   Diagnosis     Exercise-induced asthma     Mild persistent asthma with acute exacerbation     Central centrifugal scarring alopecia     Past Medical History:   Diagnosis Date     Asthma      Cervical cancer screening     10/7/09 NIL pap, + HR HPV (not 16 or 18)  9/1/10 NIL Pap, Neg HPV 11 NIL pap 7/3/12 ASCUS pap, neg HPV 3/9/16 NIL Pap, Neg HPV. 19 NIL Pap, Neg HPV. Plan: cotest in 5 yr     History of  delivery, currently pregnant 2018     Migraines         CC No referring provider defined for this encounter. on close of this encounter.

## 2022-10-19 NOTE — NURSING NOTE
Clinic Administered Medication Documentation    Administrations This Visit     triamcinolone acetonide (KENALOG-10) injection 4.3 mg     Admin Date  10/19/2022 Action  Given Dose  4.3 mg Route  Intra-Lesional Site   Administered By  Geeta Sosa, RN    Ordering Provider: Sandie Leo MD    NDC: 5720-9034-39    Lot#: [id number]    : B-Anytime DD [place]. (PRIMARY CARE)    Patient Supplied?: No              Waste 4.57ml

## 2022-12-14 ENCOUNTER — OFFICE VISIT (OUTPATIENT)
Dept: OBGYN | Facility: CLINIC | Age: 32
End: 2022-12-14
Payer: COMMERCIAL

## 2022-12-14 VITALS
WEIGHT: 173.6 LBS | HEART RATE: 74 BPM | SYSTOLIC BLOOD PRESSURE: 123 MMHG | BODY MASS INDEX: 28.92 KG/M2 | DIASTOLIC BLOOD PRESSURE: 82 MMHG | HEIGHT: 65 IN | OXYGEN SATURATION: 100 %

## 2022-12-14 DIAGNOSIS — Z01.411 ENCOUNTER FOR GYNECOLOGICAL EXAMINATION WITH ABNORMAL FINDING: Primary | ICD-10-CM

## 2022-12-14 DIAGNOSIS — Z23 NEED FOR PROPHYLACTIC VACCINATION AND INOCULATION AGAINST INFLUENZA: ICD-10-CM

## 2022-12-14 DIAGNOSIS — Z11.3 SCREEN FOR STD (SEXUALLY TRANSMITTED DISEASE): ICD-10-CM

## 2022-12-14 PROCEDURE — 90686 IIV4 VACC NO PRSV 0.5 ML IM: CPT | Performed by: OBSTETRICS & GYNECOLOGY

## 2022-12-14 PROCEDURE — 90471 IMMUNIZATION ADMIN: CPT | Performed by: OBSTETRICS & GYNECOLOGY

## 2022-12-14 PROCEDURE — 87591 N.GONORRHOEAE DNA AMP PROB: CPT | Performed by: OBSTETRICS & GYNECOLOGY

## 2022-12-14 PROCEDURE — 87389 HIV-1 AG W/HIV-1&-2 AB AG IA: CPT | Performed by: OBSTETRICS & GYNECOLOGY

## 2022-12-14 PROCEDURE — 87491 CHLMYD TRACH DNA AMP PROBE: CPT | Performed by: OBSTETRICS & GYNECOLOGY

## 2022-12-14 PROCEDURE — 99395 PREV VISIT EST AGE 18-39: CPT | Mod: 25 | Performed by: OBSTETRICS & GYNECOLOGY

## 2022-12-14 PROCEDURE — 86780 TREPONEMA PALLIDUM: CPT | Performed by: OBSTETRICS & GYNECOLOGY

## 2022-12-14 PROCEDURE — 87340 HEPATITIS B SURFACE AG IA: CPT | Performed by: OBSTETRICS & GYNECOLOGY

## 2022-12-14 PROCEDURE — 36415 COLL VENOUS BLD VENIPUNCTURE: CPT | Performed by: OBSTETRICS & GYNECOLOGY

## 2022-12-14 NOTE — PROGRESS NOTES
Peewee Wilson is a 32 year old female , who presents for annual exam.   No unusual bleeding, no incontinence, or unusual discharge.   She has had some cramping over the years, worse with sleeping.  Her menses are getting heavier and more cramping.  She has not used anything for the cramping or the bleeding.    She does not use NSAIDs because of GI effects.   She desires STD screening of the more common STDs.  Last cholesterol: Recent Labs   Lab Test 21  0925   CHOL 183   HDL 76   LDL 97   TRIG 48     Past Medical History:   Diagnosis Date     Asthma      Cervical cancer screening     10/7/09 NIL pap, + HR HPV (not 16 or 18)  9/1/10 NIL Pap, Neg HPV 11 NIL pap 7/3/12 ASCUS pap, neg HPV 3/9/16 NIL Pap, Neg HPV. 19 NIL Pap, Neg HPV. Plan: cotest in 5 yr     History of  delivery, currently pregnant 2018     Migraines        Past Surgical History:   Procedure Laterality Date     ABDOMEN SURGERY      2 c-sections     COLONOSCOPY N/A 2022    Procedure: COLONOSCOPY, WITH POLYPECTOMY;  Surgeon: Ricardo Mayfield MD;  Location: [facility] OR     ESOPHAGOSCOPY, GASTROSCOPY, DUODENOSCOPY (EGD), COMBINED N/A 2022    Procedure: ESOPHAGOGASTRODUODENOSCOPY, WITH BIOPSY;  Surgeon: Ricardo Mayfield MD;  Location: [facility] OR     GYN SURGERY      dermoid cyst     TUBAL LIGATION         OB History    Para Term  AB Living   4 3 3 0 1 3   SAB IAB Ectopic Multiple Live Births   0 0 0 0 3      # Outcome Date GA Lbr Shar/2nd Weight Sex Delivery Anes PTL Lv   4 Term 18 37w3d   M -SEC   RAMEZ   3 Term 11 39w1d  3.685 kg (8 lb 2 oz) F CS-LTranv Spinal  RAMEZ      Name: Sanyla      Apgar1: 7  Apgar5: 8   2 Term 06/04/10 41w3d  3.827 kg (8 lb 7 oz) M CS-LTranv Spinal, EPI  RAMEZ      Name: Ralph      Apgar1: 2  Apgar5: 8   1 AB                Gyn History:  Gynecological History         Patient's last menstrual period was 2022 (exact date).     STD: HPV /No PID/No IUD      history  of abnormal pap smear:  No  Last pap:   Lab Results   Component Value Date    PAP NIL 12/12/2019           Current Outpatient Medications   Medication Sig Dispense Refill     adapalene (DIFFERIN) 0.3 % external gel Apply a pea-sized amount evenly over the face at nighttime before bed. 45 g 11     bismuth subsalicylate (PEPTO BISMOL) 262 MG chewable tablet Take 2 tablets (524 mg) by mouth 4 times daily (before meals and nightly) 112 tablet 0     doxycycline hyclate (VIBRA-TABS) 100 MG tablet Take 1 tablet (100 mg) by mouth 2 times daily 120 tablet 0     famotidine (PEPCID) 10 MG tablet Take 10 mg by mouth 2 times daily       ferrous sulfate (FEROSUL) 325 (65 Fe) MG tablet Take every other day 60 tablet 0     Fluocinolone Acetonide Scalp (DERMA-SMOOTHE/FS SCALP) 0.01 % OIL oil Apply once daily to the scalp for 4 weeks, then 3 times weekly as neede 60 mL 1     fluticasone (FLOVENT HFA) 110 MCG/ACT inhaler Inhale 1 puff into the lungs 2 times daily 12 g 1     metroNIDAZOLE (FLAGYL) 250 MG tablet Take 1 tablet (250 mg) by mouth 4 times daily 56 tablet 0     omeprazole (PRILOSEC) 20 MG DR capsule Take 1 capsule (20 mg) by mouth 2 times daily 28 capsule 0     spironolactone (ALDACTONE) 50 MG tablet TAKE 1 TABLET BY MOUTH ONCE DAILY 30 tablet 11     sucralfate (CARAFATE) 1 GM/10ML suspension Take 10 mLs (1 g) by mouth 4 times daily 414 mL 3     vitamin D2 (ERGOCALCIFEROL) 30618 units (1250 mcg) capsule Take 1 capsule (50,000 Units) by mouth once a week 4 capsule 1     albuterol (PROAIR HFA/PROVENTIL HFA/VENTOLIN HFA) 108 (90 Base) MCG/ACT inhaler Inhale 2 puffs into the lungs every 4 hours as needed for shortness of breath / dyspnea or wheezing (Patient not taking: Reported on 12/14/2022) 18 g 0       No Known Allergies    Social History     Socioeconomic History     Marital status: Single     Spouse name: Not on file     Number of children: Not on file     Years of education: Not on file     Highest education level: Not on  "file   Occupational History     Not on file   Tobacco Use     Smoking status: Former     Smokeless tobacco: Never     Tobacco comments:     quit 2019 - was smoking 3 cigarettes daily   Vaping Use     Vaping Use: Never used   Substance and Sexual Activity     Alcohol use: No     Drug use: No     Sexual activity: Yes     Partners: Male     Birth control/protection: Female Surgical     Comment: tubal ligation   Other Topics Concern     Parent/sibling w/ CABG, MI or angioplasty before 65F 55M? No   Social History Narrative     Not on file     Social Determinants of Health     Financial Resource Strain: Not on file   Food Insecurity: Not on file   Transportation Needs: Not on file   Physical Activity: Not on file   Stress: Not on file   Social Connections: Not on file   Intimate Partner Violence: Not on file   Housing Stability: Not on file       Family History   Problem Relation Age of Onset     No Known Problems Father      No Known Problems Mother      No Known Problems Sister      No Known Problems Sister      No Known Problems Sister      No Known Problems Sister      No Known Problems Brother      No Known Problems Brother      Diabetes Other         couple aunts- maternal great aunts     Colon Cancer No family hx of      Breast Cancer No family hx of      Coronary Artery Disease No family hx of          ROS:  All negative except as above.      EXAM:  MA present for the exam  /82 (BP Location: Right arm, Cuff Size: Adult Regular)   Pulse 74   Ht 1.645 m (5' 4.75\")   Wt 78.7 kg (173 lb 9.6 oz)   LMP 12/04/2022 (Exact Date)   SpO2 100%   BMI 29.11 kg/m    General:  WNWD female, NAD  Alert  Oriented x 3  Gait:  Normal  Skin:  Normal skin turgor  Neurologic:  CN grossly intact, good sensation.    HEENT:  NC/AT, EOMI  Neck:  No masses palpated, symmetrical, carotids +2/4, no bruits heard  Heart:  RRR  Lungs:  Clear   Breasts:  Declined   Abdomen:  Non-tender, non-distended.  Vulva: No external lesions, " normal hair distribution, no adenopathy  BUS:  Normal, no masses noted  Urethra:  No hypermobility noted  Urethral meatus:  No masses noted  Vagina: Moist, pink, no abnormal discharge, well rugated, no lesions  Cervix: Anterior, smooth, pink, no visible lesions  Uterus: Normal size, anteverted, non-tender, mobile  Ovaries: No mass, non-tender, mobile  Perianal:  No masses noted.   Extremities:  No clubbing, cyanosis, or edema      ASSESSMENT/PLAN   Annual examination   STD screen.  She is aware the testing is not all inclusive of all STDs.  RTC for further discussion regarding the bleeding and the cramping.    Low fat diet, weight bearing exercises and self breast exams on a monthly basis have been recommended.  I have discussed with patient the risks, benefits, medications, treatment options and modalities.   I have instructed the patient to call or schedule a follow-up appointment if any problems.

## 2022-12-14 NOTE — LETTER
[facility]  [address]  [place] [address]  Phone: [phone]    12/14/22    Peewee MOODY Steve  [address]   [place] [address]        To whom it may concern:     Peewee was seen in our clinic today for an appointment at 3:30 to 4:35.    Sincerely,      Tommy Panda MD

## 2022-12-15 LAB
HBV SURFACE AG SERPL QL IA: NONREACTIVE
HIV 1+2 AB+HIV1 P24 AG SERPL QL IA: NONREACTIVE

## 2022-12-16 LAB
C TRACH DNA SPEC QL NAA+PROBE: NEGATIVE
N GONORRHOEA DNA SPEC QL NAA+PROBE: NEGATIVE
T PALLIDUM AB SER QL: NONREACTIVE

## 2023-01-04 ENCOUNTER — VIRTUAL VISIT (OUTPATIENT)
Dept: GASTROENTEROLOGY | Facility: CLINIC | Age: 33
End: 2023-01-04
Payer: COMMERCIAL

## 2023-01-04 VITALS — BODY MASS INDEX: 28.82 KG/M2 | HEIGHT: 65 IN | WEIGHT: 173 LBS

## 2023-01-04 DIAGNOSIS — A04.8 H. PYLORI INFECTION: Primary | ICD-10-CM

## 2023-01-04 DIAGNOSIS — D12.5 ADENOMATOUS POLYP OF SIGMOID COLON: ICD-10-CM

## 2023-01-04 DIAGNOSIS — K59.00 CONSTIPATION, UNSPECIFIED CONSTIPATION TYPE: ICD-10-CM

## 2023-01-04 PROCEDURE — 99213 OFFICE O/P EST LOW 20 MIN: CPT | Mod: 95 | Performed by: PHYSICIAN ASSISTANT

## 2023-01-04 ASSESSMENT — PAIN SCALES - GENERAL: PAINLEVEL: NO PAIN (0)

## 2023-01-04 NOTE — PROGRESS NOTES
Peewee is a 32 year old who is being evaluated via a billable video visit.      How would you like to obtain your AVS? MyChart  If the video visit is dropped, the invitation should be resent by: Text to cell phone: [phone]  Will anyone else be joining your video visit? No          GASTROENTEROLOGY FOLLOW UP VIDEO VISIT     CC/REFERRING MD:    [facility]      REASON FOR VISIT:  Video Visit      HISTORY OF PRESENT ILLNESS:    Peewee Wilson is 32 year old female who presents for follow up of H pylori, found on EGD in 2022.     She was treated through [facility] pharmacist with quadruple therapy. She reports doing well since treatment. She does not have any further GI symptoms. Denies stomach pain. No n/v. She was to complete a stool study to test for H pylori eradication but has not done so as of yet.     She does also have hx of constipation. Reports this is improved currently through dietary fiber. She still however has occ harder stools with straining. She has not tried miralax as of yet. Previous colonoscopy from 2022 did note a tortuous colon and found a precancerous sigmoid polyp and benign rectal polyp. It was recommended to have a repeat screening colonoscopy in 5 years.     Peewee  has a past medical history of Asthma, Cervical cancer screening, History of  section, and Migraines.    She  has a past surgical history that includes GYN surgery (Left, 2010); tubal ligation; Colonoscopy (N/A, 2022); Esophagoscopy, gastroscopy, duodenoscopy (EGD), combined (N/A, 2022); and  section.    She  reports that she has quit smoking. She has never used smokeless tobacco. She reports that she does not drink alcohol and does not use drugs.    Her family history includes Diabetes in an other family member; No Known Problems in her brother, brother, father, mother, sister, sister, sister, and sister.    ALLERGIES:  Patient has no known  allergies.      PERTINENT MEDICATIONS:    Current Outpatient Medications:      adapalene (DIFFERIN) 0.3 % external gel, Apply a pea-sized amount evenly over the face at nighttime before bed., Disp: 45 g, Rfl: 11     albuterol (PROAIR HFA/PROVENTIL HFA/VENTOLIN HFA) 108 (90 Base) MCG/ACT inhaler, Inhale 2 puffs into the lungs every 4 hours as needed for shortness of breath / dyspnea or wheezing (Patient not taking: Reported on 12/14/2022), Disp: 18 g, Rfl: 0     doxycycline hyclate (VIBRA-TABS) 100 MG tablet, Take 1 tablet (100 mg) by mouth 2 times daily, Disp: 120 tablet, Rfl: 0     famotidine (PEPCID) 10 MG tablet, Take 10 mg by mouth 2 times daily, Disp: , Rfl:      ferrous sulfate (FEROSUL) 325 (65 Fe) MG tablet, Take every other day, Disp: 60 tablet, Rfl: 0     Fluocinolone Acetonide Scalp (DERMA-SMOOTHE/FS SCALP) 0.01 % OIL oil, Apply once daily to the scalp for 4 weeks, then 3 times weekly as neede, Disp: 60 mL, Rfl: 1     fluticasone (FLOVENT HFA) 110 MCG/ACT inhaler, Inhale 1 puff into the lungs 2 times daily, Disp: 12 g, Rfl: 1     spironolactone (ALDACTONE) 50 MG tablet, TAKE 1 TABLET BY MOUTH ONCE DAILY, Disp: 30 tablet, Rfl: 11     sucralfate (CARAFATE) 1 GM/10ML suspension, Take 10 mLs (1 g) by mouth 4 times daily, Disp: 414 mL, Rfl: 3     vitamin D2 (ERGOCALCIFEROL) 58764 units (1250 mcg) capsule, Take 1 capsule (50,000 Units) by mouth once a week, Disp: 4 capsule, Rfl: 1      PHYSICAL EXAMINATION:  Constitutional: aaox3, cooperative, pleasant, not dyspneic/diaphoretic, no acute distress      GENERAL: Healthy, alert and no distress  EYES: Eyes grossly normal to inspection.  No discharge or erythema, or obvious scleral/conjunctival abnormalities.  RESP: No audible wheeze, cough, or visible cyanosis.  No visible retractions or increased work of breathing.    SKIN: Visible skin clear. No significant rash, abnormal pigmentation or lesions.  NEURO: Cranial nerves grossly intact.  Mentation and speech  appropriate for age.  PSYCH: Mentation appears normal, affect normal/bright, judgement and insight intact, normal speech and appearance well-groomed.          PERTINENT STUDIES: (I personally reviewed these laboratory studies today)  Most recent CBC:   Recent Labs   Lab Test 05/20/22  1531 07/13/21  0925   WBC 7.1 7.5   HGB 11.6* 12.6   HCT 35.8 38.8    295     Most recent hepatic panel:  Recent Labs   Lab Test 07/13/21  0925   ALT 25   AST 13     Most recent creatinine:  Recent Labs   Lab Test 07/13/21  0925   CR 0.67       TSH   Date Value Ref Range Status   05/20/2022 0.69 0.40 - 4.00 mU/L Final   03/24/2020 0.83 0.40 - 4.00 mU/L Final         ASSESSMENT/PLAN:    1. H. pylori infection  2. Constipation, unspecified constipation type  3. Adenomatous polyp of sigmoid colon        Peewee Wilson is a 32 year old female who presents for follow up:     h pylori - symptomatic improvement after quadruple therapy. Feeling a lot better, without upper Gi symptoms. Advised to test for eradication with h pylori stool test. Orders previously placed by [facility] pharmacist. Reviewed importance of testing for eradication     Constipation - improved through dietary changes. Encouraged increasing dietary fiber. Take miralax as needed.     Adenomatous polyp of sigmoid colon - noted on prior colonoscopy. Should have repeat at 5 years (2027), sooner for any alarming symptoms.           Thank you for this consultation.  It was a pleasure to participate in the care of this patient; please contact us with any further questions.        This note was created with voice recognition software, and while reviewed for accuracy, typos may remain.       22 minutes spent on the date of the encounter doing chart review, history and exam, documentation and further activities per the note      Janie Rangel PA-C  Division of Gastroenterology, Hepatology and Nutrition   [facility]            Video-Visit  Details    Video Start Time: 3:02 PM    Type of service:  Video Visit    Video End Time:3:09 PM    Originating Location (pt. Location): Home    Distant Location (provider location):  Off-site    Platform used for Video Visit: Leatha

## 2023-01-04 NOTE — PATIENT INSTRUCTIONS
It was a pleasure taking care of you today.  I've included a brief summary of our discussion and care plan from today's visit below.  Please review this information with your primary care provider.  _______________________________________________________________________     My recommendations are summarized as follows:     - Please complete the H pylori stool test to test for eradication   - take miralax as needed for constipation    ______________________________________________________________________     How do I schedule labs, imaging studies, or procedures that were ordered in clinic today?      Labs: To schedule lab appointment you can contact your local [facility] or call [phone] to schedule at any convenient [facility] location.     Procedures: If a colonoscopy, upper endoscopy, breath test, esophageal manometry, or pH impedence was ordered today, our endoscopy team will call you to schedule this. If you have not heard from our endoscopy team within a week, please call [phone] to schedule.      Imaging Studies: If you were scheduled for a CT scan, X-ray, MRI, ultrasound, HIDA scan or other imaging study, please call [phone] to have this scheduled.      Referral: If a referral to another specialty was ordered, expect a phone call or follow instructions above. If you have not heard from anyone regarding your referral in a week, please call our clinic to check the status.      Who do I call with any questions after my visit?  Please be in touch if there are any further questions that arise following today's visit.  There are multiple ways to contact your gastroenterology care team.       During business hours, you may reach a Gastroenterology nurse at [phone]     To schedule or reschedule an appointment, please call [phone].      You can always send a secure message through [employer].  [employer] messages are answered by your nurse or doctor typically within 24 hours.   Please allow extra time on weekends and holidays.       For urgent/emergent questions after business hours, you may reach the on-call GI Fellow by contacting the [facility]  at [phone].     How will I get the results of any tests ordered?    You will receive all of your results.  If you have signed up for [employer], any tests ordered at your visit will be available to you after your physician reviews them.  Typically this takes 1-2 weeks.  If there are urgent results that require a change in your care plan, your physician or nurse will call you to discuss the next steps.       What is [employer]?  [employer] is a secure way for you to access all of your healthcare records from the [facility].  It is a web based computer program, so you can sign on to it from any location.  It also allows you to send secure messages to your care team.  I recommend signing up for [employer] access if you have not already done so and are comfortable with using a computer.       How to I schedule a follow-up visit?  If you did not schedule a follow-up visit today, please call [phone] to schedule a follow-up office visit.      Janie Rangel PA-C  Division of Gastroenterology, Hepatology and Nutrition   [facility]

## 2023-02-06 ENCOUNTER — TELEPHONE (OUTPATIENT)
Dept: DERMATOLOGY | Facility: CLINIC | Age: 33
End: 2023-02-06
Payer: COMMERCIAL

## 2023-02-06 NOTE — TELEPHONE ENCOUNTER
[facility] Call Center    Phone Message    May a detailed message be left on voicemail: yes     Reason for Call: Other: Pt called and said that someone was suppose to call her back to r/s her missed appt but has not heard back from anyone. Pt will need to r/s her Kenalog appt with Dr. Leo. Please call her back. Thanks     Action Taken: Message routed to:  [facility] ([facility] DERM    Travel Screening: Not Applicable

## 2023-02-13 NOTE — PROGRESS NOTES
[facility] Dermatology Note  Encounter Date: Feb 15, 2023  Office Visit        Dermatology Problem List:  1. Acne vulgaris  - current tx: spironolactone, differin 0.3% gel  - previous tx: doxycycline 100 mg PO BID, tretinoin 0.025% cream, clindamycin 1% lotion, BP 4-5% wash  2. Hair loss  - Current tx: spironolactone, Rogaine, Dermasmoothe oil, HS royal oils, ILK injections, topical minoxidil, doxycyline hyclate  -prior (doxycycline) but can revisit after GI work up  3. Grandma with hair loss and aunt, no first degree relatives with hair loss to her knowledge  No family or personal history of fibroids or breast cancer.     HX of tubal ligation  ____________________________________________    Assessment & Plan:  # Hair loss- consistent with CCCA or AGA - chronic for years and active.  - Continue spironolactone 50 mg daily (she will restart)  - Continue OTC topical minoxidil  - ILK injections today, see procedure below  - Continue derma-smoothe oil three times per weeks  - Continue HS royal oils  -Can revisit doxycyline after GI issues    # Low ferritin, started on ferrous sulfate  - seeing GI     # Vitamin D deficiency,   -never started vitamin D     # Acne vulgaris  -doing well on differin, not active today      #GI issues  -sent message to GI    #primary care for weight loss  -See avs    Procedures Performed:   - Intra-lesional triamcinolone procedure note. After verbal consent and review of risk of pain and skin thinning/atrophy, positioning and cleansing with isopropyl alcohol, 2 total mL of triamcinolone 2.5 mg/mL was injected into 12 lesion(s) on the scalp. The patient tolerated the procedure well and left the dermatology clinic in good condition.       Follow-up: 3 months in person, prepare injections.     Staff and Scribe:     Adela STEWARD, am serving as a scribe to document services personally performed by Sandie Leo MD based on data collection and the provider's statements to me.        Provider Disclosure:   The documentation recorded by the scribe accurately reflects the services I personally performed and the decisions made by me.    Sandie Leo MD    Department of Dermatology  [facility]: Phone: [phone], Fax:[phone]  [facility]: Phone: [phone], Fax: [phone]    ____________________________________________    CC: Hair Loss (Peewee is here for ilk injections for hair loss. States she would also like to discuss medications)    HPI:  Ms. Peewee Wilson is a(n) 33 year old female who presents today as a return patient for hair loss. Feels stable. Has lost weight. Never took vitamin D. Is using Rogaine    Ran out of leticia and oil    Feels tired    Not taking iron    Patient is otherwise feeling well, without additional skin concerns.    Labs Reviewed:   Ref Range & Units 2 mo ago   SODIUM 135 - 145 mmol/L 135    POTASSIUM 3.5 - 5.0 mmol/L 3.6    CHLORIDE 98 - 110 mmol/L 105    CO2,TOTAL 21 - 31 mmol/L 21    ANION GAP 5 - 18 9    GLUCOSE 65 - 100 mg/dL 111 High     CALCIUM 8.5 - 10.5 mg/dL 9.3    BUN 8 - 25 mg/dL 9    CREATININE 0.57 - 1.11 mg/dL 0.81    BUN/CREAT RATIO           10 - 20 11    eGFR >90 mL/min/1.73m2 >90          Physical Exam:  Vitals: There were no vitals taken for this visit.  SKIN:   Thinning no crown, regorwth frontal hair line  - No other lesions of concern on areas examined.     Medications:  Current Outpatient Medications   Medication     adapalene (DIFFERIN) 0.3 % external gel     doxycycline hyclate (VIBRA-TABS) 100 MG tablet     famotidine (PEPCID) 10 MG tablet     ferrous sulfate (FEROSUL) 325 (65 Fe) MG tablet     Fluocinolone Acetonide Scalp (DERMA-SMOOTHE/FS SCALP) 0.01 % OIL oil     fluticasone (FLOVENT HFA) 110 MCG/ACT inhaler     spironolactone (ALDACTONE) 50 MG tablet     sucralfate (CARAFATE) 1 GM/10ML suspension      vitamin D2 (ERGOCALCIFEROL) 78434 units (1250 mcg) capsule     albuterol (PROAIR HFA/PROVENTIL HFA/VENTOLIN HFA) 108 (90 Base) MCG/ACT inhaler     No current facility-administered medications for this visit.      Past Medical History:   Patient Active Problem List   Diagnosis     Exercise-induced asthma     Mild persistent asthma with acute exacerbation     Central centrifugal scarring alopecia     Past Medical History:   Diagnosis Date     Asthma      Cervical cancer screening     10/7/09 NIL pap, + HR HPV (not 16 or 18)  9/1/10 NIL Pap, Neg HPV 11 NIL pap 7/3/12 ASCUS pap, neg HPV 3/9/16 NIL Pap, Neg HPV. 19 NIL Pap, Neg HPV. Plan: cotest in 5 yr     History of  section     x 3     Migraines         CC No referring provider defined for this encounter. on close of this encounter.

## 2023-02-13 NOTE — PATIENT INSTRUCTIONS
Intralesional Kenalog (ILK) Injections    Intralesional steroid injection involves a corticosteroid, such as triamcinolone acetonide (brand name Kenalog), which is injected directly into a lesion on or immediately below the skin. Intralesional kenalog may be used to treat many skin conditions:    Alopecia areata  Discoid lupus erythematosus  Keloids/hypertrophic scars  Granuloma annulare and other granulomatous disorders  Hypertrophic lichen planus  Lichen simplex chronicus (neurodermatitis)  Localised psoriasis  Necrobiosis lipoidica  Acne cysts (nodulocystic acne)  Small infantile hemangiomas    Possible side-effects of intralesional Kenalog (ILK) injections include bleeding, pain, skin thinning,infection, contact dermatitis, nerve damage, scar formation and need for a repeat procedure.    Some people may experience delayed side-effects including:  Lipoatrophy, appearing as skin indentations or dimples around the injection sites a few weeks after treatment; these may be permanent.  White marks (leukoderma) or brown marks (postinflammatory pigmentation) at the site of injection or spreading from the site of injection - these may resolve or persist long term.  Telangiectasia, or small dilated blood vessels at the site of injection.   Increased hair growth at the site of injection - this resolves eventually.  Steroid acne: steroids increase growth hormone, leading to increased sebum (oil) production by the sebaceous glands. Steroid acne generally improves once the steroid has been stopped.      Who should I call with questions?  [facility]: [phone]   [facility]: [phone]  For urgent needs outside of business hours call the [facility] at [phone] and ask for the dermatology resident on call

## 2023-02-15 ENCOUNTER — OFFICE VISIT (OUTPATIENT)
Dept: DERMATOLOGY | Facility: CLINIC | Age: 33
End: 2023-02-15
Payer: COMMERCIAL

## 2023-02-15 ENCOUNTER — LAB (OUTPATIENT)
Dept: LAB | Facility: CLINIC | Age: 33
End: 2023-02-15
Payer: COMMERCIAL

## 2023-02-15 DIAGNOSIS — R79.0 LOW FERRITIN: ICD-10-CM

## 2023-02-15 DIAGNOSIS — L70.0 ACNE VULGARIS: ICD-10-CM

## 2023-02-15 DIAGNOSIS — A04.8 H. PYLORI INFECTION: ICD-10-CM

## 2023-02-15 DIAGNOSIS — L65.9 LOSS OF HAIR: ICD-10-CM

## 2023-02-15 PROCEDURE — 11901 INJECT SKIN LESIONS >7: CPT | Performed by: DERMATOLOGY

## 2023-02-15 PROCEDURE — 99214 OFFICE O/P EST MOD 30 MIN: CPT | Mod: 25 | Performed by: DERMATOLOGY

## 2023-02-15 RX ORDER — FLUOCINOLONE ACETONIDE 0.11 MG/ML
OIL TOPICAL
Qty: 60 ML | Refills: 1 | Status: SHIPPED | OUTPATIENT
Start: 2023-02-15 | End: 2023-05-24

## 2023-02-15 RX ORDER — ERGOCALCIFEROL 1.25 MG/1
50000 CAPSULE, LIQUID FILLED ORAL WEEKLY
Qty: 4 CAPSULE | Refills: 1 | Status: SHIPPED | OUTPATIENT
Start: 2023-02-15 | End: 2024-01-29

## 2023-02-15 RX ORDER — FERROUS SULFATE 325(65) MG
TABLET ORAL
Qty: 60 TABLET | Refills: 1 | Status: SHIPPED | OUTPATIENT
Start: 2023-02-15 | End: 2023-09-13

## 2023-02-15 RX ORDER — ADAPALENE GEL USP, 0.3% 3 MG/G
GEL TOPICAL
Qty: 45 G | Refills: 11 | Status: SHIPPED | OUTPATIENT
Start: 2023-02-15 | End: 2023-09-13

## 2023-02-15 RX ORDER — SPIRONOLACTONE 50 MG/1
50 TABLET, FILM COATED ORAL DAILY
Qty: 30 TABLET | Refills: 11 | Status: SHIPPED | OUTPATIENT
Start: 2023-02-15 | End: 2023-05-24

## 2023-02-15 ASSESSMENT — PAIN SCALES - GENERAL: PAINLEVEL: NO PAIN (0)

## 2023-02-15 NOTE — Clinical Note
Dear Janie Rangel, Could you have your schedulers reach out to the patient. She wants not able to schedule follow up. THank you!  Sandie Leo MD  Department of Dermatology [facility]: Phone: [phone], Fax:[phone] [facility]: Phone: [phone], Fax: [phone]

## 2023-02-15 NOTE — NURSING NOTE
Dermatology Rooming Note    Peewee Wilson's goals for this visit include:   Chief Complaint   Patient presents with     Hair Loss     Peewee is here for ilk injections for hair loss.      Rachel Mclean, Visit Facilitator

## 2023-02-15 NOTE — LETTER
2/15/2023       RE: Peewee Wilson  [address]  [place] [address]     Dear Colleague,    Thank you for referring your patient, Peewee Wilson, to the [facility] [place] [facility]. Please see a copy of my visit note below.      [facility] Dermatology Note  Encounter Date: Feb 15, 2023  Office Visit        Dermatology Problem List:  1. Acne vulgaris  - current tx: spironolactone, differin 0.3% gel  - previous tx: doxycycline 100 mg PO BID, tretinoin 0.025% cream, clindamycin 1% lotion, BP 4-5% wash  2. Hair loss  - Current tx: spironolactone, Rogaine, Dermasmoothe oil, HS royal oils, ILK injections, topical minoxidil, doxycyline hyclate  -prior (doxycycline) but can revisit after GI work up  3. Grandma with hair loss and aunt, no first degree relatives with hair loss to her knowledge  No family or personal history of fibroids or breast cancer.     HX of tubal ligation  ____________________________________________    Assessment & Plan:  # Hair loss- consistent with CCCA or AGA - chronic for years and active.  - Continue spironolactone 50 mg daily (she will restart)  - Continue OTC topical minoxidil  - ILK injections today, see procedure below  - Continue derma-smoothe oil three times per weeks  - Continue HS royal oils  -Can revisit doxycyline after GI issues    # Low ferritin, started on ferrous sulfate  - seeing GI     # Vitamin D deficiency,   -never started vitamin D     # Acne vulgaris  -doing well on differin, not active today      #GI issues  -sent message to GI    #primary care for weight loss  -See avs    Procedures Performed:   - Intra-lesional triamcinolone procedure note. After verbal consent and review of risk of pain and skin thinning/atrophy, positioning and cleansing with isopropyl alcohol, 2 total mL of triamcinolone 2.5 mg/mL was injected into 12 lesion(s) on the scalp. The patient  tolerated the procedure well and left the dermatology clinic in good condition.       Follow-up: 3 months in person, prepare injections.     Staff and Scribe:     I, Adela Nails, am serving as a scribe to document services personally performed by Sandie Leo MD based on data collection and the provider's statements to me.       Provider Disclosure:   The documentation recorded by the scribe accurately reflects the services I personally performed and the decisions made by me.    Sandie Leo MD    Department of Dermatology  [facility]: Phone: [phone], Fax:[phone]  [facility]: Phone: [phone], Fax: [phone]    ____________________________________________    CC: Hair Loss (Peewee is here for ilk injections for hair loss. States she would also like to discuss medications)    HPI:  Ms. Peewee Wilson is a(n) 33 year old female who presents today as a return patient for hair loss. Feels stable. Has lost weight. Never took vitamin D. Is using Rogaine    Ran out of leticia and oil    Feels tired    Not taking iron    Patient is otherwise feeling well, without additional skin concerns.    Labs Reviewed:   Ref Range & Units 2 mo ago   SODIUM 135 - 145 mmol/L 135    POTASSIUM 3.5 - 5.0 mmol/L 3.6    CHLORIDE 98 - 110 mmol/L 105    CO2,TOTAL 21 - 31 mmol/L 21    ANION GAP 5 - 18 9    GLUCOSE 65 - 100 mg/dL 111 High     CALCIUM 8.5 - 10.5 mg/dL 9.3    BUN 8 - 25 mg/dL 9    CREATININE 0.57 - 1.11 mg/dL 0.81    BUN/CREAT RATIO           10 - 20 11    eGFR >90 mL/min/1.73m2 >90          Physical Exam:  Vitals: There were no vitals taken for this visit.  SKIN:   Thinning no crown, regorwth frontal hair line  - No other lesions of concern on areas examined.     Medications:  Current Outpatient Medications   Medication     adapalene (DIFFERIN) 0.3 % external gel     doxycycline hyclate  (VIBRA-TABS) 100 MG tablet     famotidine (PEPCID) 10 MG tablet     ferrous sulfate (FEROSUL) 325 (65 Fe) MG tablet     Fluocinolone Acetonide Scalp (DERMA-SMOOTHE/FS SCALP) 0.01 % OIL oil     fluticasone (FLOVENT HFA) 110 MCG/ACT inhaler     spironolactone (ALDACTONE) 50 MG tablet     sucralfate (CARAFATE) 1 GM/10ML suspension     vitamin D2 (ERGOCALCIFEROL) 35562 units (1250 mcg) capsule     albuterol (PROAIR HFA/PROVENTIL HFA/VENTOLIN HFA) 108 (90 Base) MCG/ACT inhaler     No current facility-administered medications for this visit.      Past Medical History:   Patient Active Problem List   Diagnosis     Exercise-induced asthma     Mild persistent asthma with acute exacerbation     Central centrifugal scarring alopecia     Past Medical History:   Diagnosis Date     Asthma      Cervical cancer screening     10/7/09 NIL pap, + HR HPV (not 16 or 18)  9/1/10 NIL Pap, Neg HPV 11 NIL pap 7/3/12 ASCUS pap, neg HPV 3/9/16 NIL Pap, Neg HPV. 19 NIL Pap, Neg HPV. Plan: cotest in 5 yr     History of  section     x 3     Migraines         CC No referring provider defined for this encounter. on close of this encounter.

## 2023-02-15 NOTE — NURSING NOTE
Clinic Administered Medication Documentation    Administrations This Visit     triamcinolone acetonide (KENALOG-10) injection 5 mg     Admin Date  02/15/2023 Action  Given Dose  5 mg Route  Intra-Lesional Site   Administered By  Geeta Sosa, RN    Ordering Provider: Sandie Leo MD    NDC: 5533-6930-73    Lot#: [id number]    : [employer] (PRIMARY CARE)    Patient Supplied?: No

## 2023-02-16 ENCOUNTER — APPOINTMENT (OUTPATIENT)
Dept: LAB | Facility: CLINIC | Age: 33
End: 2023-02-16
Payer: COMMERCIAL

## 2023-02-16 ENCOUNTER — TELEPHONE (OUTPATIENT)
Dept: GASTROENTEROLOGY | Facility: CLINIC | Age: 33
End: 2023-02-16

## 2023-02-16 PROCEDURE — 87338 HPYLORI STOOL AG IA: CPT | Performed by: PHYSICIAN ASSISTANT

## 2023-02-16 NOTE — TELEPHONE ENCOUNTER
"In review of chart, patient's H Pylori lab was completed (and currently \"in process\") today.     [employer] message sent to patient that if she has GI concerns, that she can schedule a follow up visit - scheduling number provided.     Leslie Landers RN    "

## 2023-02-17 LAB — H PYLORI AG STL QL IA: POSITIVE

## 2023-02-22 ENCOUNTER — TELEPHONE (OUTPATIENT)
Dept: GASTROENTEROLOGY | Facility: CLINIC | Age: 33
End: 2023-02-22
Payer: COMMERCIAL

## 2023-02-22 ENCOUNTER — VIRTUAL VISIT (OUTPATIENT)
Dept: PHARMACY | Facility: CLINIC | Age: 33
End: 2023-02-22
Payer: COMMERCIAL

## 2023-02-22 DIAGNOSIS — J45.990 EXERCISE-INDUCED ASTHMA: ICD-10-CM

## 2023-02-22 DIAGNOSIS — Z78.9 TAKES DIETARY SUPPLEMENTS: ICD-10-CM

## 2023-02-22 DIAGNOSIS — L66.81 CENTRAL CENTRIFUGAL SCARRING ALOPECIA: ICD-10-CM

## 2023-02-22 DIAGNOSIS — L70.0 ACNE VULGARIS: ICD-10-CM

## 2023-02-22 DIAGNOSIS — A04.8 H. PYLORI INFECTION: Primary | ICD-10-CM

## 2023-02-22 PROCEDURE — 99607 MTMS BY PHARM ADDL 15 MIN: CPT | Performed by: PHARMACIST

## 2023-02-22 PROCEDURE — 99605 MTMS BY PHARM NP 15 MIN: CPT | Performed by: PHARMACIST

## 2023-02-22 RX ORDER — LEVOFLOXACIN 500 MG/1
500 TABLET, FILM COATED ORAL DAILY
Qty: 14 TABLET | Refills: 0 | Status: SHIPPED | OUTPATIENT
Start: 2023-02-22 | End: 2023-03-28

## 2023-02-22 RX ORDER — AMOXICILLIN 250 MG/1
750 CAPSULE ORAL 3 TIMES DAILY
Qty: 126 CAPSULE | Refills: 0 | Status: SHIPPED | OUTPATIENT
Start: 2023-02-22 | End: 2023-03-28

## 2023-02-22 NOTE — TELEPHONE ENCOUNTER
Thank you,  Korey Porter, [facility]  [facility]  @~~~~~~

## 2023-02-22 NOTE — PROGRESS NOTES
Medication Therapy Management (MTM) Encounter    ASSESSMENT:                            Medication Adherence/Access: No issues identified    H pylori: Peewee would benefit from re-treatment of H pylori given positive result on eradication testing. It is difficult to determine if the first attempt was really due to resistance given difficulty with tolerance, but given number of alternate options, recommend proceeding with levofloxacin triple therapy. Will plan for eradication testing at least four weeks after therapy. We discussed that I agree with Janie Rangel PA-C, that she should schedule a follow-up in the GI clinic to discuss symptoms related to weight loss, to determine if further workup is warranted. It is still very possible her symptoms could be attributed to ongoing H pylori infection - so again emphasized completing the medications as directed if able.     Skin/Hair: Stable based on report. Plan in place for follow-up with dermatology.    Supplements: Stable. Discussing timing of iron w/ levofloxacin during treatment to minimize impact on efficacy.    Shortness of Breath: Stable based on report.    PLAN:                            1.  Would recommend levofloxacin triple therapy x 14 days for second treatment of H pylori:   -- esomeprazole 20 mg by mouth twice daily   -- levofloxacin 500 mg by mouth daily   -- amoxicillin 750 mg by mouth three times daily     2. Reminder to schedule follow-up with Janie Rangel PA-C as directed    Follow-up:    -- in 7-10 days for check-in on treatment.   -- at least four weeks after treatment for eradication testing (tenatively repeat stool antigen unless an EGD is otherwise indicated)    EDUCATION:     We reviewed H pylori today including associated symptoms, potential for antibiotic resistance and importance of finishing treatment if able, as well as follow-up testing. Discussed levofloxacin therapy today including medications, dosing, side effects, precautions and general  counseling information. This included a discussion on the risk of tendonitis/tendon rupture with fluoroquinolone therapy and signs/symptoms to monitor for. Reviewed spacing of supplements from levofloxacin to minimize impact on efficacy. Contact information provided in the event she has questions/concerns related to treatment.    SUBJECTIVE/OBJECTIVE:                          Peewee Wilson is a 33 year old female called for a follow-up visit.  Today's visit is a follow-up MT visit from 10/10/2022     Reason for visit: H pylori treatment (second attempt)    Allergies/ADRs: None  Tobacco: She reports that she has quit smoking. She has never used smokeless tobacco.  Alcohol: Less than 1 beverages / week    Medication Adherence/Access: no issues identified    H pylori:   Famotidine (not taking)    Peewee was initially treated with bismuth quadruple therapy in October 2022. Notes she was able to finish all the medications - but she did stretch this out a bit. I tried to further clarify, and she notes that she believes this took her a few extra days to finish all the medications due to stomach upset/nausea. She returned for eradication testing on 2/16/2023, which remained positive. She states she messaged her doctor because she continues to have lack of appetite with weight loss and has noticed her symptoms are getting worse. She was encouraged to schedule a follow-up with me to discuss re-treatment and also schedule an appointment with her GI provider. Chart notes indicate she has previously had a tubal ligation after her last child in around 2018.         Skin/Hair:  Spironolactone 50 mg daily   Adapalene 0.3% gel at night  Fluocinolone 0.01% scalp oil    Denies concerns. Notes she sees a dermatologist for her skin and one for her hair. She is no longer taking the doxycycline.    Supplements:   Ferrous sulfate every other day (morning)  Vitamin D 50,000 units once weekly    No reported concerns.    Shortness of Breath:    Flovent 110 mcg/ACT as needed  Albuterol HFA as needed     No reported concerns today.    ----------------    I spent 15 minutes with this patient today. All changes were made via collaborative practice agreement with Janie Rangel PA-C A copy of the visit note was provided to the patient's provider(s).    A summary of these recommendations was sent via [facility].    Jessica JimenezD, BCACP  MTM Pharmacist   [facility]   Phone: [phone]    Telemedicine Visit Details  Type of service:  Telephone visit  Start Time: 12:36 PM  End Time: 12:51 PM     Medication Therapy Recommendations  H. pylori infection    Rationale: Condition refractory to medication - Ineffective medication - Effectiveness   Recommendation: Change Medication - levofloxacin 500 MG tablet   Status: Accepted per CPA   Note: Would recommend levofloxacin triple therapy x 14 days for the re-treatment of H pylori.

## 2023-02-23 NOTE — PATIENT INSTRUCTIONS
"Recommendations from today's MTM visit:                                                      1.  Would recommend levofloxacin triple therapy x 14 days for second treatment of H pylori:   -- esomeprazole 20 mg by mouth twice daily   -- levofloxacin 500 mg by mouth daily   -- amoxicillin 750 mg by mouth three times daily     2. Reminder to schedule follow-up with Janie Rangel PA-C as directed    Follow-up:    -- in 7-10 days for check-in on treatment.   -- at least four weeks after treatment for eradication testing (tenatively repeat stool antigen unless an EGD is otherwise indicated)    It was great speaking with you today.  I value your experience and would be very thankful for your time in providing feedback in our clinic survey. In the next few days, you may receive an email or text message from [facility] with a link to a survey related to your  clinical pharmacist.\"     To schedule another MTM appointment, please call the clinic directly or you may call the MTM scheduling line at [phone] or toll-free at [phone].     My Clinical Pharmacist's contact information:                                                      Please feel free to contact me with any questions or concerns you have.      Jessica JimenezD, BCACP  MTM Pharmacist   [facility]   Phone: [phone]    "

## 2023-02-27 NOTE — TELEPHONE ENCOUNTER
Central Prior Authorization Team   Phone: [phone]      PA Initiation    Medication: esomeprazole  Insurance Company: [employer] - Phone [phone] Fax [phone]  Pharmacy Filling the Rx: [facility] - [address]  Filling Pharmacy Phone: [phone]  Filling Pharmacy Fax:    Start Date: 2/27/2023

## 2023-02-28 NOTE — TELEPHONE ENCOUNTER
Prior Authorization Approval    Authorization Effective Date: 1/1/2023  Authorization Expiration Date: 3/13/2023  Medication: Esomeprazole-APPROVED  Approved Dose/Quantity:   Reference #:     Insurance Company: [employer] - Phone [phone] Fax [phone]  Expected CoPay:       CoPay Card Available:      Foundation Assistance Needed:    Which Pharmacy is filling the prescription (Not needed for infusion/clinic administered): [facility] [place], [place] - [address]  Pharmacy Notified: Yes  Patient Notified: No  **Instructed pharmacy to notify patient when script is ready to /ship.**

## 2023-03-25 ENCOUNTER — MYC MEDICAL ADVICE (OUTPATIENT)
Dept: PHARMACY | Facility: CLINIC | Age: 33
End: 2023-03-25
Payer: COMMERCIAL

## 2023-03-25 DIAGNOSIS — A04.8 H. PYLORI INFECTION: Primary | ICD-10-CM

## 2023-04-19 ENCOUNTER — OFFICE VISIT (OUTPATIENT)
Dept: OBGYN | Facility: CLINIC | Age: 33
End: 2023-04-19
Payer: COMMERCIAL

## 2023-04-19 VITALS
DIASTOLIC BLOOD PRESSURE: 79 MMHG | SYSTOLIC BLOOD PRESSURE: 125 MMHG | BODY MASS INDEX: 27 KG/M2 | OXYGEN SATURATION: 99 % | HEART RATE: 71 BPM | WEIGHT: 161 LBS

## 2023-04-19 DIAGNOSIS — N89.8 VAGINAL IRRITATION: Primary | ICD-10-CM

## 2023-04-19 DIAGNOSIS — N89.8 VAGINAL DISCHARGE: ICD-10-CM

## 2023-04-19 DIAGNOSIS — N94.89 VAGINAL BURNING: ICD-10-CM

## 2023-04-19 DIAGNOSIS — N89.8 VAGINAL ITCHING: ICD-10-CM

## 2023-04-19 LAB
CLUE CELLS: ABNORMAL
TRICHOMONAS, WET PREP: ABNORMAL
WBC'S/HIGH POWER FIELD, WET PREP: ABNORMAL
YEAST, WET PREP: ABNORMAL

## 2023-04-19 PROCEDURE — 99213 OFFICE O/P EST LOW 20 MIN: CPT | Performed by: OBSTETRICS & GYNECOLOGY

## 2023-04-19 PROCEDURE — 87210 SMEAR WET MOUNT SALINE/INK: CPT | Performed by: OBSTETRICS & GYNECOLOGY

## 2023-04-19 NOTE — LETTER
April 19, 2023      Peewee Wilson  [address]   [place] [address]        To Whom It May Concern:    Peewee Wilson  was seen on 4/19/2023 at 10:15 am.  Please excuse her due to her appointment.      Sincerely,            Tommy Panda MD

## 2023-04-19 NOTE — PROGRESS NOTES
"Peewee Wilson is a 33 year old female, .  She presents today with irritation internally and externally.   It started the end of March, before her cycle began.  It was much worse then, \"severe\" with swelling, burning, irritation.  Wiping with tissue aggravated the symptoms.   It has improved a bit.   She has not used any medications.  She has used warm baths, and cold compresses and these may have helped.   She has no other aggravating or alleviating factors.    She has not had this previously.        Past Medical History:   Diagnosis Date     Asthma      Cervical cancer screening     10/7/09 NIL pap, + HR HPV (not 16 or 18)  9/1/10 NIL Pap, Neg HPV 11 NIL pap 7/3/12 ASCUS pap, neg HPV 3/9/16 NIL Pap, Neg HPV. 19 NIL Pap, Neg HPV. Plan: cotest in 5 yr     History of  section     x 3     Migraines        Past Surgical History:   Procedure Laterality Date      SECTION      x 3 with last c/section including a tubal ligation     COLONOSCOPY N/A 2022    Procedure: COLONOSCOPY, WITH POLYPECTOMY;  Surgeon: Ricardo Mayfield MD;  Location: [facility] OR     ESOPHAGOSCOPY, GASTROSCOPY, DUODENOSCOPY (EGD), COMBINED N/A 2022    Procedure: ESOPHAGOGASTRODUODENOSCOPY, WITH BIOPSY;  Surgeon: Ricardo Mayfield MD;  Location: [facility] OR     GYN SURGERY Left 2010    dermoid cyst.      TUBAL LIGATION      with last  section        No Known Allergies    Current Outpatient Medications   Medication Sig Dispense Refill     adapalene (DIFFERIN) 0.3 % external gel Apply a pea-sized amount evenly over the face at nighttime before bed. 45 g 11     albuterol (PROAIR HFA/PROVENTIL HFA/VENTOLIN HFA) 108 (90 Base) MCG/ACT inhaler Inhale 2 puffs into the lungs every 4 hours as needed for shortness of breath / dyspnea or wheezing 18 g 0     ferrous sulfate (FEROSUL) 325 (65 Fe) MG tablet Take every other day 60 tablet 1     Fluocinolone Acetonide Scalp (DERMA-SMOOTHE/FS SCALP) 0.01 % OIL oil Apply once " daily to the scalp for 4 weeks, then 3 times weekly as neede 60 mL 1     fluticasone (FLOVENT HFA) 110 MCG/ACT inhaler Inhale 1 puff into the lungs 2 times daily 12 g 1     spironolactone (ALDACTONE) 50 MG tablet Take 1 tablet (50 mg) by mouth daily 30 tablet 11     vitamin D2 (ERGOCALCIFEROL) 98697 units (1250 mcg) capsule Take 1 capsule (50,000 Units) by mouth once a week 4 capsule 1       Social History     Socioeconomic History     Marital status: Single     Spouse name: Not on file     Number of children: Not on file     Years of education: Not on file     Highest education level: Not on file   Occupational History     Not on file   Tobacco Use     Smoking status: Former     Passive exposure: Past     Smokeless tobacco: Never     Tobacco comments:     quit 2019 - was smoking 3 cigarettes daily   Vaping Use     Vaping status: Never Used   Substance and Sexual Activity     Alcohol use: No     Drug use: No     Sexual activity: Yes     Partners: Male     Birth control/protection: Female Surgical     Comment: tubal ligation   Other Topics Concern     Parent/sibling w/ CABG, MI or angioplasty before 65F 55M? No   Social History Narrative     Not on file     Social Determinants of Health     Financial Resource Strain: Not on file   Food Insecurity: Not on file   Transportation Needs: Not on file   Physical Activity: Not on file   Stress: Not on file   Social Connections: Not on file   Intimate Partner Violence: Not on file   Housing Stability: Not on file       Family History   Problem Relation Age of Onset     No Known Problems Father      No Known Problems Mother      No Known Problems Sister      No Known Problems Sister      No Known Problems Sister      No Known Problems Sister      No Known Problems Brother      No Known Problems Brother      Diabetes Other         couple aunts- maternal great aunts     Colon Cancer No family hx of      Breast Cancer No family hx of      Coronary Artery Disease No family hx of         Review of Systems:  10 point ROS of systems including Constitutional, Eyes, Respiratory, Cardiovascular, Gastroenterology, Genitourinary, Integumentary, Muscularskeletal, Psychiatric were all negative except for pertinent positives noted in my HPI and in the PMH.      Exam  /79 (BP Location: Right arm, Cuff Size: Adult Regular)   Pulse 71   Wt 73 kg (161 lb)   LMP 04/04/2023 (Exact Date)   SpO2 99%   BMI 27.00 kg/m    General:  WNWD female, NAD  Alert  Oriented x 3  Gait:  Normal  Skin:  Normal skin turgor  HEENT:  NC/AT, EOMI  Abdomen:  Non-tender, non-distended.  Vulva: No external lesions, normal hair distribution, no adenopathy  BUS:  Normal, no masses noted  Urethra:  No hypermobility seen  Urethral meatus:  No masses noted  Vagina: Moist, pink, no abnormal discharge, well rugated, no lesions  Cervix: Smooth, pink, no visible lesions  Uterus: Normal size, anteverted, non-tender, mobile  Ovaries: No mass, non-tender, mobile  Perianal:  No masses noted  Extremities:  No clubbing, no cyanosis and no edema.      Labs:    Component      Latest Ref Rng 4/19/2023  9:55 AM   Trichomonas      Absent  Absent    Yeast      Absent  Absent    Clue cells      Absent  Absent    WBCs/high power field      None  3+ !         Assessment  Vaginal itching   Vulvar irritation   Vaginal irritation   Vaginal discharge      Plan  Wet prep is ordered.  The results are reviewed and no concerning findings at this time.  It is possible her body systems might have taken care of the issues, and yet still have some residual effects.  I suggest NOT to use Vagisil as it may subject her to a vicious cycle of use-relief-symptoms-use-relief, etc.  I suggest to try the OTC hydrocortisone cream.  She may use this daily with out much risk for adverse effects of chronic use (compared with the prescription steroid creams).    Questions seem to be answered.   She will let me know questions she may have or return for re-evaluation as  needed.     Total time preparing to see patient with reviewing prior encounter and labs, face to face time,  and coordinating care on the same calendar date:  25 minutes.     Tommy Panda MD

## 2023-05-10 ENCOUNTER — APPOINTMENT (OUTPATIENT)
Dept: LAB | Facility: CLINIC | Age: 33
End: 2023-05-10
Payer: COMMERCIAL

## 2023-05-18 ENCOUNTER — APPOINTMENT (OUTPATIENT)
Dept: LAB | Facility: CLINIC | Age: 33
End: 2023-05-18
Payer: COMMERCIAL

## 2023-05-23 NOTE — PROGRESS NOTES
[facility] Dermatology Note  Encounter Date: May 24, 2023  Office Visit     Dermatology Problem List:  1. Acne vulgaris  - current tx: spironolactone, differin 0.3% gel  - previous tx: doxycycline 100 mg PO BID, tretinoin 0.025% cream, clindamycin 1% lotion, BP 4-5% wash  2. Hair loss  - Current tx: spironolactone, Rogaine, Dermasmoothe oil, HS royal oils, ILK injections, topical minoxidil, doxycyline hyclate  -prior (doxycycline) but can revisit after GI work up  3. Grandma with hair loss and aunt, no first degree relatives with hair loss to her knowledge  No family or personal history of fibroids or breast cancer.      HX of tubal ligation    ____________________________________________    Assessment & Plan:    # Hair loss- consistent with CCCA or AGA - chronic for years and active.  - Continue Spironolactone 50 mg daily. No dizziness or lightheadedness. BP today 117/77  - Continue topical minoxidil  - Continue derma-smoothe oil three times per weeks  - ILK today, see procedure below     # Low ferritin  - Follows with GI on ferrous sulfaate     # Vitamin D deficiency,   - Continue Vitamin D supplementation     # Acne vulgaris  -Doing well on differin, not active today  - For skin peels on the face: Discussed risk of peels including but not limited to erythema, peeling, redness, blisters, reactivation of HSV, rare risk of scarring, and hypo and hyperpigmentation. Peel handout provided. Patient will likely require 3-6 peels, approximately 4-6 weeks apart for improvement. Patient will be using illuminize peel. Patient will discontinue differin/retinoinds/BP/efudex containing products 1 week prior and 1 week after. Gomez skin type VI. Denies allergies to strawberries/aspirin/sulfa.  Has not been in Accutane in the last 6 months.  Not currently pregnant or breastfeeding. Patient has not had other *skin procedures in the last week and was counseled to avoid hair removal procedures including  lasers, depilatory cream, waxing and electrolysis the week prior.  Consent obtained in clinic today and pre-peel instructions provided in written format.   Procedures Performed:   - Intra-lesional triamcinolone procedure note. After verbal consent and review of risk of pain and skin thinning/atrophy, positioning and cleansing with isopropyl alcohol, 2 total mL of triamcinolone 2.5 mg/mL was injected into 40 lesion(s) on the scalp. The patient tolerated the procedure well and left the dermatology clinic in good condition.    Follow-up: 3 month(s) in-person, or earlier for new or changing lesions. Okay to see Geeta Sosa prior for chemical peels. Illuminize. Stop differin 1 week prior.     Staff and Scribe:     Scribe Disclosure:  I, Donaldo Dick, am serving as a scribe to document services personally performed by Sandie Leo MD based on data collection and the provider's statements to me.     Provider Disclosure:   The documentation recorded by the scribe accurately reflects the services I personally performed and the decisions made by me.    Sandie Leo MD    Department of Dermatology  [facility]: Phone: [phone], Fax:[phone]  [facility]: Phone: [phone], Fax: [phone]    ____________________________________________    CC: Derm Problem (Patient is here for ILK injection. Patient states they were content with the previous injection. Is having some inflammation and pain with the area. Requesting refill on hair oil, vitamin D and spironolactone. )    HPI:  Ms. Peewee Wilson is a(n) 33 year old female who presents today as a return patient for follow-up. Last seen by me on 2/15/2023, at which time patient underwent ILK injections for treatment of hair loss.    Today, she reports that spironolactone has been helpful for her acne. She denies dizziness or light  headedness. She also reports that her GI issues have resolved.    Patient is otherwise feeling well, without additional skin concerns.    Labs Reviewed:  N/A    Physical Exam:  Vitals: /77   LMP 2023 (Exact Date)   SKIN: Focused examination of scalp was performed.  - Hair growth along frontal scalp.  - Thinning on crown with >1 cm regrowth.  - No other lesions of concern on areas examined.     Medications:  Current Outpatient Medications   Medication     adapalene (DIFFERIN) 0.3 % external gel     albuterol (PROAIR HFA/PROVENTIL HFA/VENTOLIN HFA) 108 (90 Base) MCG/ACT inhaler     ferrous sulfate (FEROSUL) 325 (65 Fe) MG tablet     Fluocinolone Acetonide Scalp (DERMA-SMOOTHE/FS SCALP) 0.01 % OIL oil     fluticasone (FLOVENT HFA) 110 MCG/ACT inhaler     spironolactone (ALDACTONE) 50 MG tablet     vitamin D2 (ERGOCALCIFEROL) 34349 units (1250 mcg) capsule     No current facility-administered medications for this visit.      Past Medical History:   Patient Active Problem List   Diagnosis     Exercise-induced asthma     Mild persistent asthma with acute exacerbation     Central centrifugal scarring alopecia     Past Medical History:   Diagnosis Date     Asthma      Cervical cancer screening     10/7/09 NIL pap, + HR HPV (not 16 or 18)  9/1/10 NIL Pap, Neg HPV 11 NIL pap 7/3/12 ASCUS pap, neg HPV 3/9/16 NIL Pap, Neg HPV. 19 NIL Pap, Neg HPV. Plan: cotest in 5 yr     History of  section     x 3     Migraines         CC No referring provider defined for this encounter. on close of this encounter.

## 2023-05-24 ENCOUNTER — OFFICE VISIT (OUTPATIENT)
Dept: DERMATOLOGY | Facility: CLINIC | Age: 33
End: 2023-05-24

## 2023-05-24 ENCOUNTER — OFFICE VISIT (OUTPATIENT)
Dept: DERMATOLOGY | Facility: CLINIC | Age: 33
End: 2023-05-24
Payer: COMMERCIAL

## 2023-05-24 VITALS — DIASTOLIC BLOOD PRESSURE: 77 MMHG | SYSTOLIC BLOOD PRESSURE: 117 MMHG

## 2023-05-24 DIAGNOSIS — L65.9 LOSS OF HAIR: ICD-10-CM

## 2023-05-24 DIAGNOSIS — R79.0 LOW FERRITIN: Primary | ICD-10-CM

## 2023-05-24 DIAGNOSIS — L66.9 CICATRICIAL ALOPECIA: Primary | ICD-10-CM

## 2023-05-24 DIAGNOSIS — L70.0 ACNE VULGARIS: ICD-10-CM

## 2023-05-24 PROCEDURE — 11901 INJECT SKIN LESIONS >7: CPT | Performed by: DERMATOLOGY

## 2023-05-24 PROCEDURE — 99213 OFFICE O/P EST LOW 20 MIN: CPT | Mod: 25 | Performed by: DERMATOLOGY

## 2023-05-24 RX ORDER — SPIRONOLACTONE 50 MG/1
50 TABLET, FILM COATED ORAL DAILY
Qty: 30 TABLET | Refills: 11 | Status: SHIPPED | OUTPATIENT
Start: 2023-05-24

## 2023-05-24 RX ORDER — FLUOCINOLONE ACETONIDE 0.11 MG/ML
OIL TOPICAL
Qty: 60 ML | Refills: 1 | Status: SHIPPED | OUTPATIENT
Start: 2023-05-24 | End: 2023-09-13

## 2023-05-24 ASSESSMENT — PAIN SCALES - GENERAL: PAINLEVEL: MODERATE PAIN (4)

## 2023-05-24 NOTE — NURSING NOTE
Drug Administration Record        Drug Name: triamcinolone acetonide(kenalog)  Dose: 0.5mL of triamcinolone 10mg/mL, 5mg dose  Route administered: ID  NDC #: Kenalog-10 (5481-2807-21)  Amount of waste(mL):49.5ml  Reason for waste: Single use vial    LOT #: [id number]  : [employer]  EXPIRATION DATE: 8/2024

## 2023-05-24 NOTE — PROGRESS NOTES
** PT NOT SEEN YET  ** NOTE NOT EDITED YET    [facility] Dermatology Note  Encounter Date: May 24, 2023  Office Visit     Dermatology Problem List:  1. Acne vulgaris  - current tx: spironolactone, differin 0.3% gel  - previous tx: doxycycline 100 mg PO BID, tretinoin 0.025% cream, clindamycin 1% lotion, BP 4-5% wash  2. Hair loss  - Current tx: spironolactone, Rogaine, Dermasmoothe oil, HS royal oils, ILK injections, topical minoxidil, doxycyline hyclate  3. Grandma with hair loss and aunt, no first degree relatives with hair loss to her knowledge  No family or personal history of fibroids or breast cancer.     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  - Continue spironolactone 50 mg daily. Patient denies dizziness or spotting. Patient was taking originally from Dr. Catalan. Last BMP normal.  - Continue OTC topical minoxidil  - ILK injections today, see procedure below  - Continue derma-smoothe oil three times per weeks  - Continue HS royal oils  -Start doxycycline 100 mg BID. Recommend that patient try tocalcium-containing products around the time of taking the medication.  Instructed to take with a full glass of fluid and food, not lying down.  Side effects of photosensitivity, headaches, GI upset discussed. Recommend sun protection. Has tubal ligation.       # Low ferritin, started on ferrous sulfate  - seeing GI     # Vitamin D deficiency, started on replacement  - Continue Vitamin D supplementation     # Acne vulgaris  - Refilled Differin gel nightly  - Continue spironolactone 50 mg daily    Procedures Performed:   - Intra-lesional triamcinolone procedure note. After verbal consent and review of risk of pain and skin thinning/atrophy, positioning and cleansing with isopropyl alcohol, 1.7 total mL of triamcinolone 2.5 mg/mL was injected as a grid into 25 area(s) on the scalp. The patient  tolerated the procedure well and left the dermatology clinic in good condition.    Follow-up: 3 month(s) in-person, Plan for injections at next visit    Staff and Scribe:     Scribe Disclosure:  I, Donaldo Dick, am serving as a scribe to document services personally performed by Sandie Leo MD based on data collection and the provider's statements to me.   I, Adela Nails, am serving as a scribe to document services personally performed by Sandie Leo MD based on data collection and the provider's statements to me.     Provider Disclosure:   The documentation recorded by the scribe accurately reflects the services I personally performed and the decisions made by me.    Sandie Leo MD    Department of Dermatology  [facility]: Phone: [phone], Fax:[phone]  [facility]: Phone: [phone], Fax: [phone]      ____________________________________________    CC: Hair Loss and Hair/Scalp Problem    HPI:  Ms. Peewee Wilson is a(n) 33 year old female who presents today as a return patient for hair loss follow-up. Last seen by me on 8/24/2022, at which time patient was started on topical minoxidil and underwent ILK injections for treatment of hair loss.    Today, patient reports that she has been taking doxycyline 100 mg BID for H. Pylori. She has also been using Rogaine.    Patient is otherwise feeling well, without additional skin concerns.    Labs Reviewed:  N/A    Physical Exam:  Vitals: LMP 04/04/2023 (Exact Date)   SKIN: Focused examination of scalp was performed.  - At least 1 cm along frontal hairline including 's peal.  - Posterior scalp, hair regrowth on crown.  - Posterior scalp, scarring and loss of follicular ostia.  - No other lesions of concern on areas examined.     Medications:  Current Outpatient Medications   Medication     adapalene (DIFFERIN) 0.3 %  external gel     albuterol (PROAIR HFA/PROVENTIL HFA/VENTOLIN HFA) 108 (90 Base) MCG/ACT inhaler     ferrous sulfate (FEROSUL) 325 (65 Fe) MG tablet     Fluocinolone Acetonide Scalp (DERMA-SMOOTHE/FS SCALP) 0.01 % OIL oil     fluticasone (FLOVENT HFA) 110 MCG/ACT inhaler     spironolactone (ALDACTONE) 50 MG tablet     vitamin D2 (ERGOCALCIFEROL) 57145 units (1250 mcg) capsule     No current facility-administered medications for this visit.      Past Medical History:   Patient Active Problem List   Diagnosis     Exercise-induced asthma     Mild persistent asthma with acute exacerbation     Central centrifugal scarring alopecia     Past Medical History:   Diagnosis Date     Asthma      Cervical cancer screening     10/7/09 NIL pap, + HR HPV (not 16 or 18)  9/1/10 NIL Pap, Neg HPV 11 NIL pap 7/3/12 ASCUS pap, neg HPV 3/9/16 NIL Pap, Neg HPV. 19 NIL Pap, Neg HPV. Plan: cotest in 5 yr     History of  section     x 3     Migraines         CC No referring provider defined for this encounter. on close of this encounter.

## 2023-05-24 NOTE — PATIENT INSTRUCTIONS
[facility] Dermatology Visit    Thank you for allowing us to participate in your care. Your findings, instructions and follow-up plan are as follows:       Thank you so much for coming. Your hair is doing great!    Dr. Leo      When should I call my doctor?  If you are worsening or not improving, please, contact us or seek urgent care as noted below.     Who should I call with questions (adults)?  [facility] (adult and pediatric): [phone]  [facility] (adult): [phone]  For urgent needs outside of business hours call the [facility] at [phone] and ask for the dermatology resident on call  If this is a medical emergency and you are unable to reach an ER, Call [phone]    Who should I call with questions (pediatric)?  [facility]- Pediatric Dermatology  Dr. Danyelle Qureshi, Dr. Daisy Mijares, Dr. Radha Hill, Leatha Sher, PA  Dr. Keiko Traore, Dr. Marisol Samuel & Dr. Hussain Becker  Non Urgent  Nurse Triage Line; [phone]- Bobbi and Ann RN Care Coordinators   Lynette (/Complex ) [phone]    If you need a prescription refill, please contact your pharmacy. Refills are approved or denied by our physicians during normal business hours, Monday through Fridays  Per office policy, refills will not be granted if you have not been seen within the past year (or sooner depending on your child's condition).    Scheduling Information:  Pediatric Appointment Scheduling and Call Center [phone]  Radiology Scheduling- [phone]  Sedation Unit Scheduling- [phone]  [facility] Scheduling- General [phone]; Pediatric Dermatology [phone]      Chemical Peel Preparation    Pre-procedure:  Reveal all medical or skin conditions, especially cold sores, use of prescriptions medications, recent peels or other facial procedure, etc. to help determine  procedure plan  Arrive with a fully cleansed face, free of makeup  It is recommended that men not shave within a few hours prior to the treatment  Plan for photos to track progress and in case of side effects    Stop ALL of the following 7 days pre-procedure:  Topical vitamin A products such as retinols or retinoids (Differin, adapalene, Retin-A, tretinoin)  Products with glycolic acid, salicylic acid, Vitamin A, hydroquinone  Depilatory use/waxing  Chemical peel within the 8 weeks depending on peel depth  Excessively drying or irritating products  Electrolysis  Hair chemicals    2 weeks pre-procedure:  Cannot have Botox of fillers    4-6 weeks pre-procedure:  Free of cystic acne or herpes outbreak  If using Efudex treatment for pre-cancer, treatment needs to be completed    Who should I call with questions?  [facility]: [phone]  [facility]: [phone]  For urgent needs outside of business hours call the [facility] at [phone] and ask for the dermatology [id number]   Main  Services: [phone]  Yakut: [phone]  Cuban: [phone]  Hmong/Kyrgyz/Slovak: [phone]  Preadmission Nursing Department Fax Number: [phone] (fax all pre-operative paperwork to this number)    For urgent matters arising during evenings, weekends, or holidays that cannot wait for normal business hours please call [phone] and ask for the dermatology resident on call to be paged.

## 2023-05-24 NOTE — LETTER
5/24/2023       RE: Peewee Wilson  [address]  [place] [address]     Dear Colleague,    Thank you for referring your patient, Peewee Wilson, to the [facility] DERMATOLOGY CL[facility]IC [place] at [facility]. Please see a copy of my visit note below.    [facility] Dermatology Note  Encounter Date: May 24, 2023  Office Visit     Dermatology Problem List:  1. Acne vulgaris  - current tx: spironolactone, differin 0.3% gel  - previous tx: doxycycline 100 mg PO BID, tretinoin 0.025% cream, clindamycin 1% lotion, BP 4-5% wash  2. Hair loss  - Current tx: spironolactone, Rogaine, Dermasmoothe oil, HS royal oils, ILK injections, topical minoxidil, doxycyline hyclate  -prior (doxycycline) but can revisit after GI work up  3. Grandma with hair loss and aunt, no first degree relatives with hair loss to her knowledge  No family or personal history of fibroids or breast cancer.      HX of tubal ligation    ____________________________________________    Assessment & Plan:    # Hair loss- consistent with CCCA or AGA - chronic for years and active.  - Continue Spironolactone 50 mg daily. No dizziness or lightheadedness. BP today 117/77  - Continue topical minoxidil  - Continue derma-smoothe oil three times per weeks  - ILK today, see procedure below     # Low ferritin  - Follows with GI on ferrous sulfaate     # Vitamin D deficiency,   - Continue Vitamin D supplementation     # Acne vulgaris  -Doing well on differin, not active today  - For skin peels on the face: Discussed risk of peels including but not limited to erythema, peeling, redness, blisters, reactivation of HSV, rare risk of scarring, and hypo and hyperpigmentation. Peel handout provided. Patient will likely require 3-6 peels, approximately 4-6 weeks apart for improvement. Patient will be using illuminize peel. Patient will discontinue differin/retinoinds/BP/efudex containing  products 1 week prior and 1 week after. Gomez skin type VI. Denies allergies to strawberries/aspirin/sulfa.  Has not been in Accutane in the last 6 months.  Not currently pregnant or breastfeeding. Patient has not had other *skin procedures in the last week and was counseled to avoid hair removal procedures including lasers, depilatory cream, waxing and electrolysis the week prior.  Consent obtained in clinic today and pre-peel instructions provided in written format.   Procedures Performed:   - Intra-lesional triamcinolone procedure note. After verbal consent and review of risk of pain and skin thinning/atrophy, positioning and cleansing with isopropyl alcohol, 2 total mL of triamcinolone 2.5 mg/mL was injected into 40 lesion(s) on the scalp. The patient tolerated the procedure well and left the dermatology clinic in good condition.    Follow-up: 3 month(s) in-person, or earlier for new or changing lesions. Okay to see Geeta Sosa prior for chemical peels. Illuminize. Stop differin 1 week prior.     Staff and Scribe:     Scribe Disclosure:  I, Donaldo Dick, am serving as a scribe to document services personally performed by Sandie Leo MD based on data collection and the provider's statements to me.     Provider Disclosure:   The documentation recorded by the scribe accurately reflects the services I personally performed and the decisions made by me.    Sandie Leo MD    Department of Dermatology  [facility]: Phone: [phone], Fax:[phone]  [facility]: Phone: [phone], Fax: [phone]    ____________________________________________    CC: Derm Problem (Patient is here for ILK injection. Patient states they were content with the previous injection. Is having some inflammation and pain with the area. Requesting refill on hair oil, vitamin D and spironolactone.  )    HPI:  Ms. Peewee Wilson is a(n) 33 year old female who presents today as a return patient for follow-up. Last seen by me on 2/15/2023, at which time patient underwent ILK injections for treatment of hair loss.    Today, she reports that spironolactone has been helpful for her acne. She denies dizziness or light headedness. She also reports that her GI issues have resolved.    Patient is otherwise feeling well, without additional skin concerns.    Labs Reviewed:  N/A    Physical Exam:  Vitals: /77   LMP 2023 (Exact Date)   SKIN: Focused examination of scalp was performed.  - Hair growth along frontal scalp.  - Thinning on crown with >1 cm regrowth.  - No other lesions of concern on areas examined.     Medications:  Current Outpatient Medications   Medication    adapalene (DIFFERIN) 0.3 % external gel    albuterol (PROAIR HFA/PROVENTIL HFA/VENTOLIN HFA) 108 (90 Base) MCG/ACT inhaler    ferrous sulfate (FEROSUL) 325 (65 Fe) MG tablet    Fluocinolone Acetonide Scalp (DERMA-SMOOTHE/FS SCALP) 0.01 % OIL oil    fluticasone (FLOVENT HFA) 110 MCG/ACT inhaler    spironolactone (ALDACTONE) 50 MG tablet    vitamin D2 (ERGOCALCIFEROL) 32708 units (1250 mcg) capsule     No current facility-administered medications for this visit.      Past Medical History:   Patient Active Problem List   Diagnosis    Exercise-induced asthma    Mild persistent asthma with acute exacerbation    Central centrifugal scarring alopecia     Past Medical History:   Diagnosis Date    Asthma     Cervical cancer screening     10/7/09 NIL pap, + HR HPV (not 16 or 18)  9/1/10 NIL Pap, Neg HPV 11 NIL pap 7/3/12 ASCUS pap, neg HPV 3/9/16 NIL Pap, Neg HPV. 19 NIL Pap, Neg HPV. Plan: cotest in 5 yr    History of  section     x 3    Migraines         CC No referring provider defined for this encounter. on close of this encounter.

## 2023-05-24 NOTE — LETTER
5/24/2023       RE: Peweee Wilson  [address]  [place] [address]     Dear Colleague,    Thank you for referring your patient, Peewee Wilson, to the [facility] DERMATOLOGY CLINIC [place] at [facility]. Please see a copy of my visit note below.    ** PT NOT SEEN YET  ** NOTE NOT EDITED YET    [facility] Dermatology Note  Encounter Date: May 24, 2023  Office Visit     Dermatology Problem List:  1. Acne vulgaris  - current tx: spironolactone, differin 0.3% gel  - previous tx: doxycycline 100 mg PO BID, tretinoin 0.025% cream, clindamycin 1% lotion, BP 4-5% wash  2. Hair loss  - Current tx: spironolactone, Rogaine, Dermasmoothe oil, HS royal oils, ILK injections, topical minoxidil, doxycyline hyclate  3. Grandma with hair loss and aunt, no first degree relatives with hair loss to her knowledge  No family or personal history of fibroids or breast cancer.     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  - Continue spironolactone 50 mg daily. Patient denies dizziness or spotting. Patient was taking originally from Dr. Catalan. Last BMP normal.  - Continue OTC topical minoxidil  - ILK injections today, see procedure below  - Continue derma-smoothe oil three times per weeks  - Continue HS royal oils  -Start doxycycline 100 mg BID. Recommend that patient try tocalcium-containing products around the time of taking the medication.  Instructed to take with a full glass of fluid and food, not lying down.  Side effects of photosensitivity, headaches, GI upset discussed. Recommend sun protection. Has tubal ligation.       # Low ferritin, started on ferrous sulfate  - seeing GI     # Vitamin D deficiency, started on replacement  - Continue Vitamin D supplementation     # Acne vulgaris  - Refilled Differin gel nightly  - Continue  spironolactone 50 mg daily    Procedures Performed:   - Intra-lesional triamcinolone procedure note. After verbal consent and review of risk of pain and skin thinning/atrophy, positioning and cleansing with isopropyl alcohol, 1.7 total mL of triamcinolone 2.5 mg/mL was injected as a grid into 25 area(s) on the scalp. The patient tolerated the procedure well and left the dermatology clinic in good condition.    Follow-up: 3 month(s) in-person, Plan for injections at next visit    Staff and Scribe:     Scribe Disclosure:  IDonaldo, am serving as a scribe to document services personally performed by Sandie Leo MD based on data collection and the provider's statements to me.   IAdela, am serving as a scribe to document services personally performed by Sandie Leo MD based on data collection and the provider's statements to me.     Provider Disclosure:   The documentation recorded by the scribe accurately reflects the services I personally performed and the decisions made by me.    Sandie Leo MD    Department of Dermatology  [facility]: Phone: [phone], Fax:[phone]  [facility]: Phone: [phone], Fax: [phone]      ____________________________________________    CC: Hair Loss and Hair/Scalp Problem    HPI:  Ms. Peewee Wilson is a(n) 33 year old female who presents today as a return patient for hair loss follow-up. Last seen by me on 8/24/2022, at which time patient was started on topical minoxidil and underwent ILK injections for treatment of hair loss.    Today, patient reports that she has been taking doxycyline 100 mg BID for H. Pylori. She has also been using Rogaine.    Patient is otherwise feeling well, without additional skin concerns.    Labs Reviewed:  N/A    Physical Exam:  Vitals: LMP 04/04/2023 (Exact Date)   SKIN: Focused examination of  scalp was performed.  - At least 1 cm along frontal hairline including 's peal.  - Posterior scalp, hair regrowth on crown.  - Posterior scalp, scarring and loss of follicular ostia.  - No other lesions of concern on areas examined.     Medications:  Current Outpatient Medications   Medication    adapalene (DIFFERIN) 0.3 % external gel    albuterol (PROAIR HFA/PROVENTIL HFA/VENTOLIN HFA) 108 (90 Base) MCG/ACT inhaler    ferrous sulfate (FEROSUL) 325 (65 Fe) MG tablet    Fluocinolone Acetonide Scalp (DERMA-SMOOTHE/FS SCALP) 0.01 % OIL oil    fluticasone (FLOVENT HFA) 110 MCG/ACT inhaler    spironolactone (ALDACTONE) 50 MG tablet    vitamin D2 (ERGOCALCIFEROL) 19936 units (1250 mcg) capsule     No current facility-administered medications for this visit.      Past Medical History:   Patient Active Problem List   Diagnosis    Exercise-induced asthma    Mild persistent asthma with acute exacerbation    Central centrifugal scarring alopecia     Past Medical History:   Diagnosis Date    Asthma     Cervical cancer screening     10/7/09 NIL pap, + HR HPV (not 16 or 18)  9/1/10 NIL Pap, Neg HPV 11 NIL pap 7/3/12 ASCUS pap, neg HPV 3/9/16 NIL Pap, Neg HPV. 19 NIL Pap, Neg HPV. Plan: cotest in 5 yr    History of  section     x 3    Migraines         CC No referring provider defined for this encounter. on close of this encounter.

## 2023-05-24 NOTE — NURSING NOTE
Dermatology Rooming Note    Peewee Wilson's goals for this visit include:   Chief Complaint   Patient presents with     Derm Problem     Patient is here for ILK injection. Patient states they were content with the previous injection. Is having some inflammation and pain with the area. Requesting refill on hair oil, vitamin D and spironolactone.      Shanon Mclean, visit facilitator

## 2023-07-01 ENCOUNTER — MYC MEDICAL ADVICE (OUTPATIENT)
Dept: DERMATOLOGY | Facility: CLINIC | Age: 33
End: 2023-07-01
Payer: COMMERCIAL

## 2023-07-31 ENCOUNTER — OFFICE VISIT (OUTPATIENT)
Dept: ORTHOPEDICS | Facility: CLINIC | Age: 33
End: 2023-07-31
Payer: COMMERCIAL

## 2023-07-31 ENCOUNTER — ANCILLARY PROCEDURE (OUTPATIENT)
Dept: GENERAL RADIOLOGY | Facility: CLINIC | Age: 33
End: 2023-07-31
Attending: PEDIATRICS
Payer: COMMERCIAL

## 2023-07-31 VITALS
DIASTOLIC BLOOD PRESSURE: 76 MMHG | BODY MASS INDEX: 27.49 KG/M2 | SYSTOLIC BLOOD PRESSURE: 116 MMHG | WEIGHT: 161 LBS | HEIGHT: 64 IN

## 2023-07-31 DIAGNOSIS — G89.29 CHRONIC PAIN OF RIGHT KNEE: Primary | ICD-10-CM

## 2023-07-31 DIAGNOSIS — M25.561 RIGHT KNEE PAIN: ICD-10-CM

## 2023-07-31 DIAGNOSIS — M25.561 CHRONIC PAIN OF RIGHT KNEE: Primary | ICD-10-CM

## 2023-07-31 PROCEDURE — 99204 OFFICE O/P NEW MOD 45 MIN: CPT | Performed by: PEDIATRICS

## 2023-07-31 PROCEDURE — 73562 X-RAY EXAM OF KNEE 3: CPT | Mod: TC | Performed by: RADIOLOGY

## 2023-07-31 NOTE — LETTER
July 31, 2023      Peewee Wilson  [address] [place] [address]        To Whom It May Concern:    Peewee Wilson was seen on July 31, 2023.  Please excuse her due to appointment today.        Sincerely,        Kenneth Grant DO

## 2023-07-31 NOTE — PROGRESS NOTES
ASSESSMENT & PLAN    Peewee was seen today for pain.    Diagnoses and all orders for this visit:    Chronic pain of right knee  -     XR Knee Standing AP [place] Bilat Lat Right; Future  -     MR Knee Right w/o Contrast; Future          See Patient Instructions  Patient Instructions   Reviewed nature of the ongoing right knee pain, primarily medial aspect.  Couple considerations include early degenerative change, or arthritis (however, this is not clearly noted on the x-rays), possibly something related to the medial meniscus.  We also discussed potential for some patellofemoral component, given some discomfort with bent knee activities.  For next steps, we discussed considerations around symptom management, activity modification, imaging, rehab, and support options.  Following discussion, plan MRI of the right knee next, given ongoing symptoms and to determine next steps.    Advanced imaging is done by appointment. Please call [facility] ([facility]/[place]) [phone]  or [facility] ([facility]/[place]) [phone]  to schedule your MRI.     Some insurance companies may require a prior authorization to be completed which can delay the time until you are able to schedule your appointment.       If you are active on [employer], you may have access to your test results before your provider is able to review the study and advise on next steps.      The clinic will contact you with results. If you have not heard from the clinic within 2-3 days following your MRI, please contact us at the number listed below.      If you have any further questions for your physician or physician s care team you can contact them thru [employer] or by calling [phone].      [facility]eth [facility]    -----  Chief Complaint   Patient presents with    Right Knee - Pain       SUBJECTIVE  Tylawrence Wilson is a/an 33 year old female who is seen  "as a self referral for evaluation of right knee pain.     The patient is seen by themselves.    Onset: 3 years(s) ago, worst in the last 1 year. Reports insidious onset without acute precipitating event.  Location of Pain: right knee, medial aspect, achy feeling - no referred pain  Worsened by: sleeping in the extended position, knee bent over the other leg, deep flexion, stairs, compression sleeve (makes it more stiff)  Better with: none  Treatments tried: other medications: Bengay and compression sleeve, massage  Associated symptoms: swelling, weakness of the knees on stairs and steep grades, and feeling of instability    Orthopedic/Surgical history: NO  Social History/Occupation: working at [employer]    **  Above information per rooming staff.  Additional history:  + pain medial knee currently at rest, anteromedial knee.  Sometimes swelling in right knee. Notes more if using compression for the day, then removes.  No noted joint noise.  Can feel like knee locks at times. Feels like may give way, has not fallen to ground due to that however.        REVIEW OF SYSTEMS:  Review of Systems    OBJECTIVE:  /76   Ht 1.626 m (5' 4\")   Wt 73 kg (161 lb)   BMI 27.64 kg/m     General: healthy, alert and in no distress  Skin: no suspicious lesions or rash.  CV: distal perfusion intact   Resp: normal respiratory effort without conversational dyspnea   Psych: normal mood and affect  Gait: ambulates independently  Neuro: Normal light sensory exam of extremity       Right Knee exam    Inspection:   No clear effusion, but appearance of some fullness compared to left   no ecchymosis    ROM:      Full active and passive ROM with flexion ; mild pain end of flexion  Extension has resting position while supine of lacking few deg compared to left, otherwise grossly full    Patellar Motion:      Normal patellar tracking noted through range of motion    Tender:      medial patellar border mild       medial joint line " tender  Mild pain with patellar translation    Special Tests:      mild pain with Yury       neg (-) Lachman       neg (-) anterior drawer       neg (-) posterior drawer       neg (-) varus , some pain       neg (-) valgus , some pain        + pain with forced extension  Apley compression no change      RADIOLOGY:  Final results and radiologist's interpretation, available in the [facility] health record.  Images were reviewed with the patient in the office today.  My personal interpretation of the performed imaging: no acute bony abnormality noted. Joint spaces appear preserved.           XR Knee Standing AP [place] Bilat Lat Right    Narrative    XR KNEE STANDING AP [place] BILAT LAT RIGHT 7/31/2023 11:18 AM    HISTORY: Right knee pain    COMPARISON: None.      Impression    IMPRESSION: Right knee: No fracture or effusion. No degenerative  changes.    Left knee: Negative.    KYLEIGH WOODY MD         SYSTEM ID:  [id number]

## 2023-07-31 NOTE — PATIENT INSTRUCTIONS
Reviewed nature of the ongoing right knee pain, primarily medial aspect.  Couple considerations include early degenerative change, or arthritis (however, this is not clearly noted on the x-rays), possibly something related to the medial meniscus.  We also discussed potential for some patellofemoral component, given some discomfort with bent knee activities.  For next steps, we discussed considerations around symptom management, activity modification, imaging, rehab, and support options.  Following discussion, plan MRI of the right knee next, given ongoing symptoms and to determine next steps.    Advanced imaging is done by appointment. Please call [facility] ([facility]/[place]) [phone]  or [facility] ([facility]/[place]) [phone]  to schedule your MRI.     Some insurance companies may require a prior authorization to be completed which can delay the time until you are able to schedule your appointment.       If you are active on [employer], you may have access to your test results before your provider is able to review the study and advise on next steps.      The clinic will contact you with results. If you have not heard from the clinic within 2-3 days following your MRI, please contact us at the number listed below.      If you have any further questions for your physician or physician s care team you can contact them thru [employer] or by calling [phone].

## 2023-07-31 NOTE — LETTER
7/31/2023         RE: Peewee Wilson  [address]  [place] [address]        Dear Colleague,    Thank you for referring your patient, Peewee Wilson, to the [facility] [place]. Please see a copy of my visit note below.    ASSESSMENT & PLAN    Peewee was seen today for pain.    Diagnoses and all orders for this visit:    Chronic pain of right knee  -     XR Knee Standing AP [place] Bilat Lat Right; Future  -     MR Knee Right w/o Contrast; Future          See Patient Instructions  Patient Instructions   Reviewed nature of the ongoing right knee pain, primarily medial aspect.  Couple considerations include early degenerative change, or arthritis (however, this is not clearly noted on the x-rays), possibly something related to the medial meniscus.  We also discussed potential for some patellofemoral component, given some discomfort with bent knee activities.  For next steps, we discussed considerations around symptom management, activity modification, imaging, rehab, and support options.  Following discussion, plan MRI of the right knee next, given ongoing symptoms and to determine next steps.    Advanced imaging is done by appointment. Please call [facility] ([facility]/[place]) [phone]  or [facility] ([facility]/[place]) [phone]  to schedule your MRI.     Some insurance companies may require a prior authorization to be completed which can delay the time until you are able to schedule your appointment.       If you are active on [employer], you may have access to your test results before your provider is able to review the study and advise on next steps.      The clinic will contact you with results. If you have not heard from the clinic within 2-3 days following your MRI, please contact us at the number listed below.      If you have any further questions for your physician or physician s care team you can  "contact them thru MyChart or by calling [phone].      Kenneth Grant DO  [facility] [place]    -----  Chief Complaint   Patient presents with     Right Knee - Pain       SUBJECTIVE  Peewee Wilson is a/an 33 year old female who is seen as a self referral for evaluation of right knee pain.     The patient is seen by themselves.    Onset: 3 years(s) ago, worst in the last 1 year. Reports insidious onset without acute precipitating event.  Location of Pain: right knee, medial aspect, achy feeling - no referred pain  Worsened by: sleeping in the extended position, knee bent over the other leg, deep flexion, stairs, compression sleeve (makes it more stiff)  Better with: none  Treatments tried: other medications: Bengay and compression sleeve, massage  Associated symptoms: swelling, weakness of the knees on stairs and steep grades, and feeling of instability    Orthopedic/Surgical history: NO  Social History/Occupation: working at [employer]    **  Above information per rooming staff.  Additional history:  + pain medial knee currently at rest, anteromedial knee.  Sometimes swelling in right knee. Notes more if using compression for the day, then removes.  No noted joint noise.  Can feel like knee locks at times. Feels like may give way, has not fallen to ground due to that however.        REVIEW OF SYSTEMS:  Review of Systems    OBJECTIVE:  /76   Ht 1.626 m (5' 4\")   Wt 73 kg (161 lb)   BMI 27.64 kg/m     General: healthy, alert and in no distress  Skin: no suspicious lesions or rash.  CV: distal perfusion intact   Resp: normal respiratory effort without conversational dyspnea   Psych: normal mood and affect  Gait: ambulates independently  Neuro: Normal light sensory exam of extremity       Right Knee exam    Inspection:   No clear effusion, but appearance of some fullness compared to left   no ecchymosis    ROM:      Full active and passive ROM with flexion ; mild pain " end of flexion  Extension has resting position while supine of lacking few deg compared to left, otherwise grossly full    Patellar Motion:      Normal patellar tracking noted through range of motion    Tender:      medial patellar border mild       medial joint   Mild pain with patellar translation    Special Tests:      mild pain with Yury       neg (-) Lachman       neg (-) anterior drawer       neg (-) posterior drawer       neg (-) varus , some pain       neg (-) valgus , some pain        + pain with forced extension  Apley compression no change      RADIOLOGY:  Final results and radiologist's interpretation, available in the [facility] health record.  Images were reviewed with the patient in the office today.  My personal interpretation of the performed imaging: no acute bony abnormality noted. Joint spaces appear preserved.           XR Knee Standing AP [place] Bilat Lat Right    Narrative    XR KNEE STANDING AP [place] BILAT LAT RIGHT 7/31/2023 11:18 AM    HISTORY: Right knee pain    COMPARISON: None.      Impression    IMPRESSION: Right knee: No fracture or effusion. No degenerative  changes.    Left knee: Negative.    KYLEIGH WOODY MD         SYSTEM ID:  [id number]           Again, thank you for allowing me to participate in the care of your patient.        Sincerely,        Kenneth Grant DO

## 2023-08-17 ENCOUNTER — ANCILLARY PROCEDURE (OUTPATIENT)
Dept: MRI IMAGING | Facility: CLINIC | Age: 33
End: 2023-08-17
Attending: PEDIATRICS
Payer: COMMERCIAL

## 2023-08-17 DIAGNOSIS — G89.29 CHRONIC PAIN OF RIGHT KNEE: ICD-10-CM

## 2023-08-17 DIAGNOSIS — M25.561 CHRONIC PAIN OF RIGHT KNEE: ICD-10-CM

## 2023-08-17 PROCEDURE — 73721 MRI JNT OF LWR EXTRE W/O DYE: CPT | Mod: RT | Performed by: RADIOLOGY

## 2023-08-28 ENCOUNTER — TELEPHONE (OUTPATIENT)
Dept: ORTHOPEDICS | Facility: CLINIC | Age: 33
End: 2023-08-28

## 2023-08-28 NOTE — TELEPHONE ENCOUNTER
Please notify of right knee MRI results.  May be able to send via [employer] message attached to this encounter.  Knee MRI primarily shows some inflammation at the anterior knee (front of the knee), with potential for impingement (a pinching type issue at the anterior knee).  There is no significant degenerative change (no clear arthritis).  Radiologist also questioned potential for subtle change in the medial meniscus, but no obvious tear.  See report for specifics.  For this, would typically advise physical therapy.  If interested, can place referral, and advise she monitor course with physical therapy over 6 weeks.  If persistent issues beyond that time, or if any worsening or changing symptoms in the meantime, can return to clinic and reassess.  Thanks.  Kenneth Grant DO, CAQ        MR Knee Right w/o Contrast    Narrative    MR right knee without contrast    Techniques: Multiplanar multisequence imaging of the right  knee was  obtained without  administration of intra-articular or intravenous  contrast using routine protocol.    History: evaluate ongoing medial knee pain; Chronic pain of right  knee; Chronic pain of right knee     Comparison: Right knee radiographs 7/31/2023    Findings:    MENISCI:  Medial meniscus: Minimal free edge blunting of the meniscal body.  Lateral meniscus: Intact.    LIGAMENTS  Cruciate ligaments: Intact.  Medial supporting structures: Intact.  Lateral supporting structures: Intact.    EXTENSOR MECHANISM  Intact. Edematous appearance to the suprapatellar fat. Normal  Insall-Salvati ratio, TT-TG distance, and trochlear depth.     FLUID  No joint effusion. No substantial Baker's cyst.    OSSEOUS and ARTICULAR STRUCTURES  Bones: No fracture, contusion, or osseous lesion is seen.    Patellofemoral compartment: No hyaline cartilage disease.    Medial compartment: No hyaline cartilage disease.    Lateral compartment: No hyaline cartilage disease.    ANCILLARY FINDINGS  None.       Impression    Impression:  1. 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   2. Question minimal free edge blunting of the medial meniscal body. No  discrete meniscal tear.  3. No high-grade cartilage loss.  4. The cruciate ligaments, medial, and lateral supporting structures  are intact.    I have personally reviewed the examination and initial interpretation  and I agree with the findings.    JUTTA ELLERMANN, MD         SYSTEM ID:  [id number]

## 2023-08-28 NOTE — TELEPHONE ENCOUNTER
Talked with Peewee.  Peewee would like to discuss with work first before going forward with PT. She will reach back out via [employer] when a decision has been made for PT.  SILVIA Ramirez, ATC

## 2023-09-13 ENCOUNTER — OFFICE VISIT (OUTPATIENT)
Dept: DERMATOLOGY | Facility: CLINIC | Age: 33
End: 2023-09-13
Payer: COMMERCIAL

## 2023-09-13 DIAGNOSIS — R79.0 LOW FERRITIN: ICD-10-CM

## 2023-09-13 DIAGNOSIS — L65.9 LOSS OF HAIR: ICD-10-CM

## 2023-09-13 DIAGNOSIS — E55.9 VITAMIN D DEFICIENCY: ICD-10-CM

## 2023-09-13 DIAGNOSIS — L66.81 CENTRAL CENTRIFUGAL SCARRING ALOPECIA: Primary | ICD-10-CM

## 2023-09-13 DIAGNOSIS — L70.0 ACNE VULGARIS: ICD-10-CM

## 2023-09-13 PROCEDURE — 99214 OFFICE O/P EST MOD 30 MIN: CPT | Mod: 25 | Performed by: DERMATOLOGY

## 2023-09-13 PROCEDURE — 11901 INJECT SKIN LESIONS >7: CPT | Performed by: DERMATOLOGY

## 2023-09-13 RX ORDER — FERROUS SULFATE 325(65) MG
TABLET ORAL
Qty: 60 TABLET | Refills: 1 | Status: SHIPPED | OUTPATIENT
Start: 2023-09-13

## 2023-09-13 RX ORDER — ADAPALENE GEL USP, 0.3% 3 MG/G
GEL TOPICAL
Qty: 45 G | Refills: 11 | Status: SHIPPED | OUTPATIENT
Start: 2023-09-13

## 2023-09-13 RX ORDER — FLUOCINOLONE ACETONIDE 0.11 MG/ML
OIL TOPICAL
Qty: 60 ML | Refills: 1 | Status: SHIPPED | OUTPATIENT
Start: 2023-09-13

## 2023-09-13 ASSESSMENT — PAIN SCALES - GENERAL: PAINLEVEL: NO PAIN (0)

## 2023-09-13 NOTE — LETTER
9/13/2023       RE: Peewee Wilson  [address]  [place] [address]     Dear Colleague,    Thank you for referring your patient, Peewee Wilson, to the [facility] [place] at [facility]. Please see a copy of my visit note below.    [facility] Dermatology Note  Encounter Date: Sep 13, 2023  Office Visit     Dermatology Problem List:  1. Acne vulgaris  - current tx: spironolactone, differin 0.3% gel  - previous tx: doxycycline 100 mg PO BID, tretinoin 0.025% cream, clindamycin 1% lotion, BP 4-5% wash  2. Hair loss  - Current tx: spironolactone, Rogaine, Dermasmoothe oil, HS royal oils, ILK injections, topical minoxidil, doxycyline hyclate  3. Grandma with hair loss and aunt, no first degree relatives with hair loss to her knowledge  No family or personal history of fibroids or breast cancer.    Hx of tubal ligation     ____________________________________________    Assessment & Plan:    # Hair loss, most consistent with CCCA or AGA - chronic for years and active- she is table   - Continue spironolactone 50 mg daily.  Pregnancy on board  - Continue OTC topical minoxidil, shee feels its. Hold if irritating.   - ILK injections today, see procedure note below   - Continue derma-smoothe oil 3 times/week and increase to daily as needed  Hold doxycycline     # Low ferritin, wants refill of iron  - Seeing GI     # Vitamin D deficiency, started on replacement  - new lab today    # Acne vulgaris  Status wants to continue, want to try peel, reviewed hold retinoids prior  Have discussed skin peels at last visit (05/24/2023)  - Continue Differin gel nightly   - Continue spironolactone 50 mg daily       Procedures Performed:   Kenalog intralesional injection procedure note: After verbal consent and discussion of risks including but not limited to atrophy, pain, and bruising, time out was performed, the patient  underwent positioning and the area was prepped with isopropyl alcohol,  4 total cc of Kenalog 2.5 mg/cc was injected into 35sites on the scalp.  The patient tolerated the procedure well and left the Dermatology clinic in good condition.        Follow-up: 3 month(s) in-person, or earlier for new or changing lesions3 month(s) in-person. Plan for injections at next visit     Staff and Scribe:     Scribe Disclosure:   I, Pam Houston ([id number]), am serving as a scribe to document services personally performed by this physician, Dr. Sandie Leo, based on data collection and the provider's statements to me.       Provider Disclosure:   The documentation recorded by the scribe accurately reflects the services I personally performed and the decisions made by me.    Sandie Leo MD    Department of Dermatology  [facility]: Phone: [phone], Fax:[phone]  [facility]: Phone: [phone], Fax: [phone]   ____________________________________________    CC: Derm Problem (Peewee is here for a 4 month follow up and possible ILK injections. She states she feels like she is having flare ups and soreness lately in effected areas. )    HPI:  Ms. Peewee Wilson is a(n) 33 year old female who presents today as a return patient for hair loss. Last seen by me on 5/24/2023, at which pt underwent ILK injections for tx of hair loss. We had also reviewed the benefits and risks at the last visit of skin peels for the pt's acne.        Today, she reports that she has photos to review    Want to know about the peel and when to stop differin    Patient is otherwise feeling well, without additional skin concerns.    Labs Reviewed:  - Vitamin D (5/20/2022): low (9)           Component Ref Range & Units 1 yr ago    Vitamin D, Total (25-Hydroxy) 20 - 75 ug/L 9 Low         Last Comprehensive Metabolic Panel:  Lab  Results   Component Value Date     2021    POTASSIUM 4.0 2021    CHLORIDE 108 2021    CO2 25 2021    ANIONGAP 5 2021    GLC 88 2021    BUN 12 2021    CR 0.67 2021    GFRESTIMATED >90 2021    SHAD 9.2 2021                  Physical Exam:  Vitals: There were no vitals taken for this visit.  SKIN:    Central hair loss crown with regrowth, improved in photos   - No other lesions of concern on areas examined.     Medications:  Current Outpatient Medications   Medication    adapalene (DIFFERIN) 0.3 % external gel    albuterol (PROAIR HFA/PROVENTIL HFA/VENTOLIN HFA) 108 (90 Base) MCG/ACT inhaler    ferrous sulfate (FEROSUL) 325 (65 Fe) MG tablet    Fluocinolone Acetonide Scalp (DERMA-SMOOTHE/FS SCALP) 0.01 % OIL oil    fluticasone (FLOVENT HFA) 110 MCG/ACT inhaler    spironolactone (ALDACTONE) 50 MG tablet    vitamin D2 (ERGOCALCIFEROL) 69116 units (1250 mcg) capsule     Current Facility-Administered Medications   Medication    triamcinolone acetonide (KENALOG-10) injection 10 mg      Past Medical History:   Patient Active Problem List   Diagnosis    Exercise-induced asthma    Mild persistent asthma with acute exacerbation    Central centrifugal scarring alopecia     Past Medical History:   Diagnosis Date    Asthma     Cervical cancer screening     10/7/09 NIL pap, + HR HPV (not 16 or 18)  9/1/10 NIL Pap, Neg HPV 11 NIL pap 7/3/12 ASCUS pap, neg HPV 3/9/16 NIL Pap, Neg HPV. 19 NIL Pap, Neg HPV. Plan: cotest in 5 yr    History of  section     x 3    Migraines         CC No referring provider defined for this encounter. on close of this encounter.

## 2023-09-13 NOTE — NURSING NOTE
Dermatology Rooming Note    Peewee Wilson's goals for this visit include:   Chief Complaint   Patient presents with    Derm Problem     Kieranlawrence is here for a 4 month follow up and possible ILK injections. She states she feels like she is having flare ups and soreness lately in effected areas.      Shanon Mclean, visit facilitator

## 2023-09-13 NOTE — PROGRESS NOTES
[facility] Dermatology Note  Encounter Date: Sep 13, 2023  Office Visit     Dermatology Problem List:  1. Acne vulgaris  - current tx: spironolactone, differin 0.3% gel  - previous tx: doxycycline 100 mg PO BID, tretinoin 0.025% cream, clindamycin 1% lotion, BP 4-5% wash  2. Hair loss  - Current tx: spironolactone, Rogaine, Dermasmoothe oil, HS royal oils, ILK injections, topical minoxidil, doxycyline hyclate  3. Grandma with hair loss and aunt, no first degree relatives with hair loss to her knowledge  No family or personal history of fibroids or breast cancer.    Hx of tubal ligation     ____________________________________________    Assessment & Plan:    # Hair loss, most consistent with CCCA or AGA - chronic for years and active- she is table   - Continue spironolactone 50 mg daily.  Pregnancy on board  - Continue OTC topical minoxidil, shee feels its. Hold if irritating.   - ILK injections today, see procedure note below   - Continue derma-smoothe oil 3 times/week and increase to daily as needed  Hold doxycycline     # Low ferritin, wants refill of iron  - Seeing GI     # Vitamin D deficiency, started on replacement  - new lab today    # Acne vulgaris  Status wants to continue, want to try peel, reviewed hold retinoids prior  Have discussed skin peels at last visit (05/24/2023)  - Continue Differin gel nightly   - Continue spironolactone 50 mg daily       Procedures Performed:   Kenalog intralesional injection procedure note: After verbal consent and discussion of risks including but not limited to atrophy, pain, and bruising, time out was performed, the patient underwent positioning and the area was prepped with isopropyl alcohol,  4 total cc of Kenalog 2.5 mg/cc was injected into 35sites on the scalp.  The patient tolerated the procedure well and left the Dermatology clinic in good condition.        Follow-up: 3 month(s) in-person, or earlier for new or changing lesions3 month(s)  in-person. Plan for injections at next visit     Staff and Scribe:     Scribe Disclosure:   I, Pam MUNOZBernard DimasHouston (MS4), am serving as a scribe to document services personally performed by this physician, Dr. Sandie Leo, based on data collection and the provider's statements to me.       Provider Disclosure:   The documentation recorded by the scribe accurately reflects the services I personally performed and the decisions made by me.    Sandie Leo MD    Department of Dermatology  [facility]: Phone: [phone], Fax:[phone]  [facility]: Phone: [phone], Fax: [phone]   ____________________________________________    CC: Derm Problem (Peewee is here for a 4 month follow up and possible ILK injections. She states she feels like she is having flare ups and soreness lately in effected areas. )    HPI:  Ms. Peewee Wilson is a(n) 33 year old female who presents today as a return patient for hair loss. Last seen by me on 5/24/2023, at which pt underwent ILK injections for tx of hair loss. We had also reviewed the benefits and risks at the last visit of skin peels for the pt's acne.        Today, she reports that she has photos to review    Want to know about the peel and when to stop differin    Patient is otherwise feeling well, without additional skin concerns.    Labs Reviewed:  - Vitamin D (5/20/2022): low (9)           Component Ref Range & Units 1 yr ago    Vitamin D, Total (25-Hydroxy) 20 - 75 ug/L 9 Low         Last Comprehensive Metabolic Panel:  Lab Results   Component Value Date     07/13/2021    POTASSIUM 4.0 07/13/2021    CHLORIDE 108 07/13/2021    CO2 25 07/13/2021    ANIONGAP 5 07/13/2021    GLC 88 07/13/2021    BUN 12 07/13/2021    CR 0.67 07/13/2021    GFRESTIMATED >90 07/13/2021    SHAD 9.2 07/13/2021                  Physical Exam:  Vitals: There were no vitals  taken for this visit.  SKIN:    Central hair loss crown with regrowth, improved in photos   - No other lesions of concern on areas examined.     Medications:  Current Outpatient Medications   Medication    adapalene (DIFFERIN) 0.3 % external gel    albuterol (PROAIR HFA/PROVENTIL HFA/VENTOLIN HFA) 108 (90 Base) MCG/ACT inhaler    ferrous sulfate (FEROSUL) 325 (65 Fe) MG tablet    Fluocinolone Acetonide Scalp (DERMA-SMOOTHE/FS SCALP) 0.01 % OIL oil    fluticasone (FLOVENT HFA) 110 MCG/ACT inhaler    spironolactone (ALDACTONE) 50 MG tablet    vitamin D2 (ERGOCALCIFEROL) 47193 units (1250 mcg) capsule     Current Facility-Administered Medications   Medication    triamcinolone acetonide (KENALOG-10) injection 10 mg      Past Medical History:   Patient Active Problem List   Diagnosis    Exercise-induced asthma    Mild persistent asthma with acute exacerbation    Central centrifugal scarring alopecia     Past Medical History:   Diagnosis Date    Asthma     Cervical cancer screening     10/7/09 NIL pap, + HR HPV (not 16 or 18)  9/1/10 NIL Pap, Neg HPV 11 NIL pap 7/3/12 ASCUS pap, neg HPV 3/9/16 NIL Pap, Neg HPV. 19 NIL Pap, Neg HPV. Plan: cotest in 5 yr    History of  section     x 3    Migraines         CC No referring provider defined for this encounter. on close of this encounter.

## 2023-09-19 ENCOUNTER — LAB (OUTPATIENT)
Dept: LAB | Facility: CLINIC | Age: 33
End: 2023-09-19
Payer: COMMERCIAL

## 2023-09-19 DIAGNOSIS — E55.9 VITAMIN D DEFICIENCY: ICD-10-CM

## 2023-09-19 PROCEDURE — 36415 COLL VENOUS BLD VENIPUNCTURE: CPT

## 2023-09-19 PROCEDURE — 82306 VITAMIN D 25 HYDROXY: CPT

## 2023-09-22 LAB — DEPRECATED CALCIDIOL+CALCIFEROL SERPL-MC: 18 UG/L (ref 20–75)

## 2023-09-22 RX ORDER — ERGOCALCIFEROL 1.25 MG/1
50000 CAPSULE, LIQUID FILLED ORAL WEEKLY
Qty: 8 CAPSULE | Refills: 0 | Status: SHIPPED | OUTPATIENT
Start: 2023-09-22

## 2023-09-22 NOTE — RESULT ENCOUNTER NOTE
Vitamin d still low    I sent you a new prescription    Sandie Flores MD    Department of Dermatology  [facility]: Phone: [phone], Fax:[phone]  [facility]: Phone: [phone], Fax: [phone]

## 2023-09-25 ENCOUNTER — TELEPHONE (OUTPATIENT)
Dept: DERMATOLOGY | Facility: CLINIC | Age: 33
End: 2023-09-25
Payer: COMMERCIAL

## 2023-09-25 NOTE — TELEPHONE ENCOUNTER
Pt read [email or web address] message and will call the clinic with any questions or concerns.      Faviola Rodrigues RN on 9/25/2023 at 2:42 PM    Vitamin D deficiency screening  Order: [id number]  Status: Final result       Visible to patient: Yes (seen)       Dx: Vitamin D deficiency    1 Result Note       1 Patient Communication       Component Ref Range & Units 6 d ago 1 yr ago  Vitamin D, Total (25-Hydroxy) 20 - 75 ug/L 18 Low  9 Low   Resulting Agency  SCORE SCORE           Narrative  Performed by: SCORE    Season, race, dietary intake, and treatment affect the concentration of 25-hydroxy-Vitamin D. Values may decrease during winter months and increase during summer months. Values 20-29 ug/L may indicate Vitamin D insufficiency and values <20 ug/L may indicate Vitamin D deficiency.    Vitamin D determination is routinely performed by an immunoassay specific for 25 hydroxyvitamin D3.  If an individual is on vitamin D2(ergocalciferol) supplementation, please specify 25 OH vitamin D2 and D3 level determination by LCMSMS test VITD23.    Specimen Collected: 09/19/23  9:34 AM Last Resulted: 09/22/23  5:43 PM           Vitamin d still low     I sent you a new prescription     Sandie Flores MD    Department of Dermatology  [facility]: Phone: [phone], Fax:[phone]  [facility]: Phone: [phone], Fax: [phone]

## 2023-09-27 ENCOUNTER — OFFICE VISIT (OUTPATIENT)
Dept: DERMATOLOGY | Facility: CLINIC | Age: 33
End: 2023-09-27
Payer: COMMERCIAL

## 2023-09-27 DIAGNOSIS — L81.9 DYSPIGMENTATION: Primary | ICD-10-CM

## 2023-09-27 PROCEDURE — 96999 UNLISTED SPEC DERM SVC/PX: CPT | Performed by: DERMATOLOGY

## 2023-09-27 NOTE — PATIENT INSTRUCTIONS
Skin Peel Post Care Instructions    I will experience redness, swelling, peeling, pain, and heat sensation which can last 5-10days and may persist for 2-3weeks. I may experience itching, or acne. Risks are permanent scarring, temporary or permanent skin lightening or darkening, infection, and eye injury. I understand my outcome could be no improvement or slight improvement. Multiple treatments may be required.     What can I expect?  Skin peeling for three to five days  Flaking  Skin darkening  Redness    How do I take care of my skin?  Patient compliance is mandatory for an optimal outcome and to avoid complications  Wear sunscreen (SPF 30 or greater) and a hat. Stay out of the sun for three to four weeks   Use a gentle skin care regimen with a sunscreen with at least an SPF of 30 or more. Examples include the following:   SkinCeuticals Gentle Cleanser and SkinCeuticals Physical Matte UV DEFENSE SPF 50   Cetaphil Soap followed by Cetaphil Sunscreen in the am and pm  Put on a bland moisturizer (Aquaphor or Vaseline) if sunscreen stings  Do not pick at peel or peel the skin. This will cause scarring.  Wait to use medicated creams until the skin has healed. This occurs five to seven days later  You can use make-up after 24 hours. Make sure make-up does NOT contain salicylic acid.   Eye make-up and lipstick are okay immediately after procedure  Do not use facial scrubs, depilatories, steam, any exfoliation procedures, etc. on your face (or treated area of skin) for one week post-peel    When should I call the doctor?  Cold sores  Swelling  Crusting    Who should I call with questions?  [facility]: [phone]   [facility]: [phone]  For urgent needs outside of business hours call the [facility] at [phone] and ask for the dermatology resident on call

## 2023-09-27 NOTE — LETTER
9/27/2023       RE: Peewee Wilson  [address]  [place] [address]     Dear Colleague,    Thank you for referring your patient, Peewee Wilson, to the [facility] DERMATOLOGY [facility]IN[facility] [place] at [facility]. Please see a copy of my visit note below.    Nursing Peel Treatment Record:  Sep 27, 2023    Pre peel:  Any changes in health or medications: No  Strawberry or aspirin allergy (If yes, then no salicylic acid peels to be given and procedure to be held):  No  Sulfa allergy (If yes, then SkinCeuticals Sensitive Skin peel procedure to be held):  No  Is the patient taking Valtrex:  No.  If so, date started:    Pregnant or breastfeeding (If yes, peel procedure to be held): No  Baseline photo obtained (3 views): Yes  Areas treated today: face  Treatment #: 1.  Was retinoid stopped 7 days prior to peel: N/A  Peel Type: Illuminize peel(Skin Medica)  Has patient had electrolysis, depilatory creams, waxing or laser hair removal in the last week to treatment site: No.  If yes, then hold peel.    Peel record:  Eye shields were placed.  Dermaplaning was performed: no.  Area was then toned with with the Conditioning Solution using 4X4 gauze pads.  Then, the areas were degreased with acetone. Time-out was performed to evaluate for any sensitive skin.    Hydrabalm was then applied to the lips, perinasal region and near the outer canthi.   The peel was applied with soft gauze for 2 passes  CE Ferulic and followed by Sunscreen.     Additional treatment notes:Pt did not experience warmth or tingling during chemical peel.    Post peel:  Patient reminded of need to wait to use medicated creams until the skin has healed. This occurs five to seven days later. Post peel care instructions provided including need for sun avoidance. Patient tolerated peel well, no complications noted.     Payment:  The patient paid 105 for this peel.     Scribe Disclosure:   Pam STEWARD  Rosemarie (MS4), am serving as a scribe to document services personally performed by this physician, Dr. Sandie Leo, based on data collection and the provider's statements to me.     I saw the patient and reconsented.  Reviewed the risks  including dyspigmentation, scarring, blistering and need to avoid tan prior. Reviewed this would be considered cosmetic.      Provider Disclosure:   The documentation recorded by the scribe accurately reflects the services I personally performed and the decisions made by me.     Sandie Leo MD    Department of Dermatology  [facility]: Phone: [phone], Fax:[phone]  [facility]: Phone: [phone], Fax: [phone]

## 2023-09-27 NOTE — PROGRESS NOTES
Nursing Peel Treatment Record:  Sep 27, 2023    Pre peel:  Any changes in health or medications: No  Strawberry or aspirin allergy (If yes, then no salicylic acid peels to be given and procedure to be held):  No  Sulfa allergy (If yes, then SkinCeuticals Sensitive Skin peel procedure to be held):  No  Is the patient taking Valtrex:  No.  If so, date started:    Pregnant or breastfeeding (If yes, peel procedure to be held): No  Baseline photo obtained (3 views): Yes  Areas treated today: face  Treatment #: 1.  Was retinoid stopped 7 days prior to peel: N/A  Peel Type: Illuminize peel(Skin Medica)  Has patient had electrolysis, depilatory creams, waxing or laser hair removal in the last week to treatment site: No.  If yes, then hold peel.    Peel record:  Eye shields were placed.  Dermaplaning was performed: no.  Area was then toned with with the Conditioning Solution using 4X4 gauze pads.  Then, the areas were degreased with acetone. Time-out was performed to evaluate for any sensitive skin.    Hydrabalm was then applied to the lips, perinasal region and near the outer canthi.   The peel was applied with soft gauze for 2 passes  CE Ferulic and followed by Sunscreen.     Additional treatment notes:Pt did not experience warmth or tingling during chemical peel.    Post peel:  Patient reminded of need to wait to use medicated creams until the skin has healed. This occurs five to seven days later. Post peel care instructions provided including need for sun avoidance. Patient tolerated peel well, no complications noted.     Payment:  The patient paid 105 for this peel.     Scribe Disclosure:   I, aPm Houston ([email or web address]), am serving as a scribe to document services personally performed by this physician, Dr. Sandie Leo, based on data collection and the provider's statements to me.     I saw the patient and reconsented.  Reviewed the risks  including dyspigmentation, scarring, blistering and need to avoid tan prior. Reviewed  this would be considered cosmetic.      Provider Disclosure:   The documentation recorded by the scribe accurately reflects the services I personally performed and the decisions made by me.     Sandie Leo MD    Department of Dermatology  [facility]: Phone: [phone], Fax:[phone]  [facility]: Phone: [phone], Fax: [phone]

## 2023-11-15 ENCOUNTER — OFFICE VISIT (OUTPATIENT)
Dept: DERMATOLOGY | Facility: CLINIC | Age: 33
End: 2023-11-15
Payer: COMMERCIAL

## 2023-11-15 DIAGNOSIS — L70.0 ACNE VULGARIS: Primary | ICD-10-CM

## 2023-11-15 PROCEDURE — 99213 OFFICE O/P EST LOW 20 MIN: CPT | Performed by: DERMATOLOGY

## 2023-11-15 PROCEDURE — 96999 UNLISTED SPEC DERM SVC/PX: CPT | Performed by: DERMATOLOGY

## 2023-11-15 RX ORDER — DOXYCYCLINE 100 MG/1
100 CAPSULE ORAL 2 TIMES DAILY
Qty: 180 CAPSULE | Refills: 0 | Status: SHIPPED | OUTPATIENT
Start: 2023-11-15

## 2023-11-15 NOTE — PATIENT INSTRUCTIONS
Skin Peel Post Care Instructions    I will experience redness, swelling, peeling, pain, and heat sensation which can last 5-10days and may persist for 2-3weeks. I may experience itching, or acne. Risks are permanent scarring, temporary or permanent skin lightening or darkening, infection, and eye injury. I understand my outcome could be no improvement or slight improvement. Multiple treatments may be required.     What can I expect?  Skin peeling for three to five days  Flaking  Skin darkening  Redness    How do I take care of my skin?  Patient compliance is mandatory for an optimal outcome and to avoid complications  Wear sunscreen (SPF 30 or greater) and a hat. Stay out of the sun for three to four weeks   Use a gentle skin care regimen with a sunscreen with at least an SPF of 30 or more. Examples include the following:   SkinCeuticals Gentle Cleanser and SkinCeuticals Physical Matte UV DEFENSE SPF 50   Cetaphil Soap followed by Cetaphil Sunscreen in the am and pm  Put on a bland moisturizer (Aquaphor or Vaseline) if sunscreen stings  Do not pick at peel or peel the skin. This will cause scarring.  Wait to use medicated creams until the skin has healed. This occurs five to seven days later  You can use make-up after 24 hours. Make sure make-up does NOT contain salicylic acid.   Eye make-up and lipstick are okay immediately after procedure  Do not use facial scrubs, depilatories, steam, any exfoliation procedures, etc. on your face (or treated area of skin) for one week post-peel    When should I call the doctor?  Cold sores  Swelling  Crusting    Who should I call with questions?  [facility]: [phone]   [facility]: [phone]  For urgent needs outside of business hours call the [facility] at [phone] and ask for the dermatology resident on call           Doxycycline     1.Doxycycline treats and prevents infections and may be used to  treat acne.   2.Do not use it if you had an allergic reaction to doxycycline or another tetracycline antibiotic, or if you are pregnant or breastfeeding.  3. If you miss a dose, take a dose as soon as you remember. If it is almost time for your next dose, wait until then and take a regular dose. Do not take extra medicine to make up for a missed dose.   4 . Some foods and medicines can affect the medication. Tell your doctor if you are using any of the following: bismuth subsalicylate, isotretinoin, acitretin, medications that contain aluminum, calcium, or iron (such an antacid or vitamin supplement)  5. This medicine may cause birth defects if being used during pregnancy.  Use two forms of birth control to keep from getting pregnant.   6. Tell your doctor if you had stomach surgery or if you have a history of yeast infections.   7. This medicine may cause the following problems (stop the medication if you experience these symptoms and call your doctor):  Permanent change in tooth color (in children younger than 8 years old)   Increased pressure inside the head   Yeast infection   Diarrhea  This medicine may make your skin more sun sensitive and increase sun burns. Wear sunscreen and do not tan.   Allergic reaction with itching, hives, facial swelling, throat swelling, chest tightness, trouble breathing   Blistering, peeling, red skin rash   Burning, pain, or irritation in your upper stomach or throat   Joint pain, fever, rash, and unusual tiredness or weakness   Severe headache, dizziness, or vision changes  8. Call your doctor if your symptoms worsen or do not improve  Who should I call with questions?  [facility]: [phone]   [facility]: [phone]  For urgent needs outside of business hours call the [facility] at [phone] and ask for the dermatology resident on call

## 2023-11-15 NOTE — PROGRESS NOTES
Nursing Peel Treatment Record:  Nov 15, 2023    Pre peel:  Any changes in health or medications: No  Strawberry or aspirin allergy (If yes, then no salicylic acid peels to be given and procedure to be held):  No  Sulfa allergy (If yes, then SkinCeuticals Sensitive Skin peel procedure to be held):  No  Is the patient taking Valtrex:  No.  If so, date started:    Pregnant or breastfeeding (If yes, peel procedure to be held): No  Baseline photo obtained (3 views): Yes  Areas treated today: Face  Treatment #: 2.  Was retinoid stopped 7 days prior to peel: N/A  Peel Type: Vitalize  Has patient had electrolysis, depilatory creams, waxing or laser hair removal in the last week to treatment site: No.  If yes, then hold peel.    Peel record:  Eye shields were placed.  Area to be treated was cleansed with Simply Clean Cleanser using 4X4 gauze pads.  Dermaplaning was performed: no.  Then, the areas were degreased with acetone. Time-out was performed to evaluate for any sensitive skin.    Hydrabalm was then applied to the lips, perinasal region and near the outer canthi.   The peel was applied with 2x2 soft guaze for 1 pass each vial.  CE Ferulic and bright block Sunscreen    Additional treatment notes: Pt tolerated peel. Highest warmth 5/10    Post peel:  Patient reminded of need to wait to use medicated creams until the skin has healed. This occurs five to seven days later. Post peel care instructions provided including need for sun avoidance. Patient tolerated peel well, no complications noted.     Payment:  The patient paid for One chemical peel.

## 2023-11-15 NOTE — LETTER
11/15/2023       RE: Peewee Wilson  [address]  [place] [address]     Dear Colleague,    Thank you for referring your patient, Peewee Wilson, to the [facility]ERMATOLOG[facility] [place] at [facility]. Please see a copy of my visit note below.    Derm clinic note-medical      Dermatology Problem List:  1. Acne vulgaris  - current tx: spironolactone, differin 0.3% gel  - previous tx: doxycycline 100 mg PO BID, tretinoin 0.025% cream, clindamycin 1% lotion, BP 4-5% wash  2. Hair loss  - Current tx: spironolactone, Rogaine, Dermasmoothe oil, HS royal oils, ILK injections, topical minoxidil, doxycyline hyclate  3. Grandma with hair loss and aunt, no first degree relatives with hair loss to her knowledge  No family or personal history of fibroids or breast cancer.     Hx of tubal ligation      ____________________________________________     Assessment & Plan:     # Hair loss, most consistent with CCCA or AGA - chronic for years and active- feels stable   - Continue spironolactone 50 mg daily.  Pregnancy protection is on board (typo in last note)  - Continue OTC topical minoxidil, shee feels its. Hold if irritating.   - not change to topical minoxidil           # Vitamin D deficiency, started on replacement  - pt is taking     # Acne vulgaris  Status wants to continue, want to try peel, reviewed hold retinoids prior  Have discussed skin peels at last visit (05/24/2023)  - hold differin  -metrocream  Start doxycycline 100 mg BID. Recommend that patient try tocalcium-containing products around the time of taking the medication.  Instructed to take with a full glass of fluid and food, not lying down.  Side effects of photosensitivity, headaches, GI upset discussed. Recommend sun protection.  Tubal ligation.   - Continue spironolactone 50 mg daily     PIH- peel increase to vitalize      S: pt reports acne still active    [place] betterr      O: has  acneiform papules with hyperpigmentation      SEE COSMETIC RECORD          Nursing Peel Treatment Record:  Nov 15, 2023    Pre peel:  Any changes in health or medications: No  Strawberry or aspirin allergy (If yes, then no salicylic acid peels to be given and procedure to be held):  No  Sulfa allergy (If yes, then SkinCeuticals Sensitive Skin peel procedure to be held):  No  Is the patient taking Valtrex:  No.  If so, date started:    Pregnant or breastfeeding (If yes, peel procedure to be held): No  Baseline photo obtained (3 views): Yes  Areas treated today: Face  Treatment #: 2.  Was retinoid stopped 7 days prior to peel: N/A  Peel Type: Vitalize  Has patient had electrolysis, depilatory creams, waxing or laser hair removal in the last week to treatment site: No.  If yes, then hold peel.    Peel record:  Eye shields were placed.  Area to be treated was cleansed with Simply Clean Cleanser using 4X4 gauze pads.  Dermaplaning was performed: no.  Then, the areas were degreased with acetone. Time-out was performed to evaluate for any sensitive skin.    Hydrabalm was then applied to the lips, perinasal region and near the outer canthi.   The peel was applied with 2x2 soft guaze for 1 pass each vial.  CE Ferulic and bright block Sunscreen    Additional treatment notes: Pt tolerated peel. Highest warmth 5/10    Post peel:  Patient reminded of need to wait to use medicated creams until the skin has healed. This occurs five to seven days later. Post peel care instructions provided including need for sun avoidance. Patient tolerated peel well, no complications noted.     Payment:  The patient paid for One chemical peel.

## 2024-01-22 NOTE — PROGRESS NOTES
[facility] Dermatology Note  Encounter Date: Jan 24, 2024  Office Visit     Dermatology Problem List:  1. Acne vulgaris  - current tx: spironolactone, differin 0.3% gel  - previous tx: doxycycline 100 mg PO BID, tretinoin 0.025% cream, clindamycin 1% lotion, BP 4-5% wash  2. Hair loss  - Current tx: spironolactone, Rogaine, Dermasmoothe oil, HS royal oils, ILK injections, topical minoxidil, doxycyline hyclate  3. Grandma with hair loss and aunt, no first degree relatives with hair loss to her knowledge  No family or personal history of fibroids or breast cancer.     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  Pregnancy protection is on board (typo in last note)  - Continue OTC topical minoxidil.  Hold if irritating.   - ILK injections today. See procedure section.  - Discussed oral minoxidil including black box warning. Declines at this time but will consider for future.  - Future considerations: oral minoxidil.        # Vitamin D deficiency, started on replacement  - pt is still taking.   - Repeat Vitamin D labs. Ordered today.     # Acne vulgaris  Status wants to continue, want to try peel, reviewed hold retinoids prior  Have discussed skin peels at last visit (05/24/2023)  - hold differin  -metrocream  -finish doxycycline  - Continue spironolactone 50 mg daily      PIH- peel increase to vitalize      Procedures Performed:   - Intra-lesional triamcinolone procedure note. After verbal consent and review of risk of pain and skin thinning/atrophy, positioning and cleansing with isopropyl alcohol, 4 total mL of triamcinolone 5 mg/mL was injected into 40 lesion(s) on the scalp. The patient tolerated the procedure well and left the dermatology clinic in good condition.      Follow-up: 3 month(s) in-person, or earlier  for new or changing lesions    Staff and Scribe:     Scribe Disclosure:   I, Areli Brenner, am serving as a scribe to document services personally performed by Sandie Leo MD based on data collection and the provider's statements to me.       Provider Disclosure:   The documentation recorded by the scribe accurately reflects the services I personally performed and the decisions made by me.    Sandie eLo MD    Department of Dermatology  [facility] [place]: Phone: [phone], Fax:[phone]  [facility]: Phone: [phone], Fax: [phone]   ____________________________________________    CC: Derm Problem (Patient is here for ILK injections. States she is unable to tell if working or not right now. She needs refills. )    HPI:  Ms. Peewee Wilson is a(n) 34 year old female who presents today as a return patient for ILK injections.    Last seen by me 11/15/23 for acne vulgaris. Started doxycyxline 100 mg BID and metrocream.    Today, patient reports no changes to medical history and feeling well overall. Patient has history of tubal ligation. Denies any cardiac history.       Not pregnant    Wants to review acne    Patient is otherwise feeling well, without additional skin concerns.    Labs Reviewed:  N/A    Physical Exam:  Vitals: There were no vitals taken for this visit.  SKIN: Focused examination of scalp was performed.  - 1-2 cm of hair growth along frontal hair line.  - Patches of hair loss on the  bilateral scalp above ear, approximately 4cm X 4cm each.  - Similar patch of hair loss noted on the crown.  - No other lesions of concern on areas examined.     Medications:  Current Outpatient Medications   Medication    adapalene (DIFFERIN) 0.3 % external gel    albuterol (PROAIR HFA/PROVENTIL HFA/VENTOLIN HFA) 108 (90 Base) MCG/ACT inhaler    doxycycline hyclate (VIBRAMYCIN) 100 MG capsule     ferrous sulfate (FEROSUL) 325 (65 Fe) MG tablet    Fluocinolone Acetonide Scalp (DERMA-SMOOTHE/FS SCALP) 0.01 % OIL oil    fluticasone (FLOVENT HFA) 110 MCG/ACT inhaler    metroNIDAZOLE (METROCREAM) 0.75 % external cream    spironolactone (ALDACTONE) 50 MG tablet    vitamin D2 (ERGOCALCIFEROL) 97558 units (1250 mcg) capsule    vitamin D2 (ERGOCALCIFEROL) 56091 units (1250 mcg) capsule     Current Facility-Administered Medications   Medication    triamcinolone acetonide (KENALOG-10) injection 10 mg      Past Medical History:   Patient Active Problem List   Diagnosis    Exercise-induced asthma    Mild persistent asthma with acute exacerbation    Central centrifugal scarring alopecia     Past Medical History:   Diagnosis Date    Asthma     Cervical cancer screening     10/7/09 NIL pap, + HR HPV (not 16 or 18)  9/1/10 NIL Pap, Neg HPV 11 NIL pap 7/3/12 ASCUS pap, neg HPV 3/9/16 NIL Pap, Neg HPV. 19 NIL Pap, Neg HPV. Plan: cotest in 5 yr    History of  section     x 3    Migraines         CC No referring provider defined for this encounter. on close of this encounter.

## 2024-01-24 ENCOUNTER — OFFICE VISIT (OUTPATIENT)
Dept: DERMATOLOGY | Facility: CLINIC | Age: 34
End: 2024-01-24
Payer: COMMERCIAL

## 2024-01-24 ENCOUNTER — APPOINTMENT (OUTPATIENT)
Dept: LAB | Facility: CLINIC | Age: 34
End: 2024-01-24
Payer: COMMERCIAL

## 2024-01-24 DIAGNOSIS — L65.9 LOSS OF HAIR: ICD-10-CM

## 2024-01-24 DIAGNOSIS — L65.9 LOSS OF HAIR: Primary | ICD-10-CM

## 2024-01-24 LAB — VIT D+METAB SERPL-MCNC: 17 NG/ML (ref 20–50)

## 2024-01-24 PROCEDURE — 99214 OFFICE O/P EST MOD 30 MIN: CPT | Mod: 25 | Performed by: DERMATOLOGY

## 2024-01-24 PROCEDURE — 99000 SPECIMEN HANDLING OFFICE-LAB: CPT | Performed by: PATHOLOGY

## 2024-01-24 PROCEDURE — 36415 COLL VENOUS BLD VENIPUNCTURE: CPT | Performed by: PATHOLOGY

## 2024-01-24 PROCEDURE — 82306 VITAMIN D 25 HYDROXY: CPT | Performed by: DERMATOLOGY

## 2024-01-24 PROCEDURE — 11901 INJECT SKIN LESIONS >7: CPT | Performed by: DERMATOLOGY

## 2024-01-24 ASSESSMENT — PAIN SCALES - GENERAL: PAINLEVEL: MODERATE PAIN (5)

## 2024-01-24 NOTE — NURSING NOTE
Dermatology Rooming Note    Peewee Wilson's goals for this visit include:   Chief Complaint   Patient presents with    Derm Problem     Patient is here for ILK injections. States she is unable to tell if working or not right now. She needs refills.      Shanon Mclean, visit facilitator

## 2024-01-24 NOTE — PATIENT INSTRUCTIONS
Intralesional Kenalog (ILK) Injections    Intralesional steroid injection involves a corticosteroid, such as triamcinolone acetonide (brand name Kenalog), which is injected directly into a lesion on or immediately below the skin. Intralesional kenalog may be used to treat the following skin conditions:    Alopecia areata  Discoid lupus erythematosus  Keloids/hypertrophic scars  Granuloma annulare and other granulomatous disorders  Hypertrophic lichen planus  Lichen simplex chronicus (neurodermatitis)  Localised psoriasis  Necrobiosis lipoidica  Acne cysts (nodulocystic acne)  Small infantile hemangiomas    Possible side-effects of intralesional Kenalog (ILK) injections include bleeding, pain, infection, contact dermatitis, nerve damage, scar formation and need for a repeat procedure.    Some people may experience delayed side-effects including:  Lipoatrophy, appearing as skin indentations or dimples around the injection sites a few weeks after treatment; these may be permanent.  White marks (leukoderma) or brown marks (postinflammatory pigmentation) at the site of injection or spreading from the site of injection - these may resolve or persist long term.  Telangiectasia, or small dilated blood vessels at the site of injection.   Increased hair growth at the site of injection - this resolves eventually.  Steroid acne: steroids increase growth hormone, leading to increased sebum (oil) production by the sebaceous glands. Steroid acne generally improves once the steroid has been stopped.      Who should I call with questions?  [facility]: [phone]   [facility]: [phone]  For urgent needs outside of business hours call the [facility] at [phone] and ask for the dermatology resident on call

## 2024-01-24 NOTE — LETTER
1/24/2024       RE: Peewee Wilson  [address]  [place] [address]     Dear Colleague,    Thank you for referring your patient, Peewee Wilson, to the [facility] [place] at [facility]. Please see a copy of my visit note below.      [facility] Dermatology Note  Encounter Date: Jan 24, 2024  Office Visit     Dermatology Problem List:  1. Acne vulgaris  - current tx: spironolactone, differin 0.3% gel  - previous tx: doxycycline 100 mg PO BID, tretinoin 0.025% cream, clindamycin 1% lotion, BP 4-5% wash  2. Hair loss  - Current tx: spironolactone, Rogaine, Dermasmoothe oil, HS royal oils, ILK injections, topical minoxidil, doxycyline hyclate  3. Grandma with hair loss and aunt, no first degree relatives with hair loss to her knowledge  No family or personal history of fibroids or breast cancer.     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  Pregnancy protection is on board (typo in last note)  - Continue OTC topical minoxidil.  Hold if irritating.   - ILK injections today. See procedure section.  - Discussed oral minoxidil including black box warning. Declines at this time but will consider for future.  - Future considerations: oral minoxidil.        # Vitamin D deficiency, started on replacement  - pt is still taking.   - Repeat Vitamin D labs. Ordered today.     # Acne vulgaris  Status wants to continue, want to try peel, reviewed hold retinoids prior  Have discussed skin peels at last visit (05/24/2023)  - hold differin  -metrocream  -finish doxycycline  - Continue spironolactone 50 mg daily      PIH- peel increase to vitalize      Procedures Performed:   - Intra-lesional triamcinolone procedure note. After verbal consent  and review of risk of pain and skin thinning/atrophy, positioning and cleansing with isopropyl alcohol, 4 total mL of triamcinolone 5 mg/mL was injected into 40 lesion(s) on the scalp. The patient tolerated the procedure well and left the dermatology clinic in good condition.      Follow-up: 3 month(s) in-person, or earlier for new or changing lesions    Staff and Scribe:     Scribe Disclosure:   I, Areli Brenner, am serving as a scribe to document services personally performed by Sandie Leo MD based on data collection and the provider's statements to me.       Provider Disclosure:   The documentation recorded by the scribe accurately reflects the services I personally performed and the decisions made by me.    Sandie Leo MD    Department of Dermatology  [facility] [place]: Phone: [phone], Fax:[phone]  [facility]: Phone: [phone], Fax: [phone]   ____________________________________________    CC: Derm Problem (Patient is here for ILK injections. States she is unable to tell if working or not right now. She needs refills. )    HPI:  Ms. Peewee Wilson is a(n) 34 year old female who presents today as a return patient for ILK injections.    Last seen by me 11/15/23 for acne vulgaris. Started doxycyxline 100 mg BID and metrocream.    Today, patient reports no changes to medical history and feeling well overall. Patient has history of tubal ligation. Denies any cardiac history.       Not pregnant    Wants to review acne    Patient is otherwise feeling well, without additional skin concerns.    Labs Reviewed:  N/A    Physical Exam:  Vitals: There were no vitals taken for this visit.  SKIN: Focused examination of scalp was performed.  - 1-2 cm of hair growth along frontal hair line.  - Patches of hair loss on the  bilateral scalp above ear, approximately 4cm X 4cm each.  - Similar  patch of hair loss noted on the crown.  - No other lesions of concern on areas examined.     Medications:  Current Outpatient Medications   Medication    adapalene (DIFFERIN) 0.3 % external gel    albuterol (PROAIR HFA/PROVENTIL HFA/VENTOLIN HFA) 108 (90 Base) MCG/ACT inhaler    doxycycline hyclate (VIBRAMYCIN) 100 MG capsule    ferrous sulfate (FEROSUL) 325 (65 Fe) MG tablet    Fluocinolone Acetonide Scalp (DERMA-SMOOTHE/FS SCALP) 0.01 % OIL oil    fluticasone (FLOVENT HFA) 110 MCG/ACT inhaler    metroNIDAZOLE (METROCREAM) 0.75 % external cream    spironolactone (ALDACTONE) 50 MG tablet    vitamin D2 (ERGOCALCIFEROL) 57651 units (1250 mcg) capsule    vitamin D2 (ERGOCALCIFEROL) 86376 units (1250 mcg) capsule     Current Facility-Administered Medications   Medication    triamcinolone acetonide (KENALOG-10) injection 10 mg      Past Medical History:   Patient Active Problem List   Diagnosis    Exercise-induced asthma    Mild persistent asthma with acute exacerbation    Central centrifugal scarring alopecia     Past Medical History:   Diagnosis Date    Asthma     Cervical cancer screening     10/7/09 NIL pap, + HR HPV (not 16 or 18)  9/1/10 NIL Pap, Neg HPV 11 NIL pap 7/3/12 ASCUS pap, neg HPV 3/9/16 NIL Pap, Neg HPV. 19 NIL Pap, Neg HPV. Plan: cotest in 5 yr    History of  section     x 3    Migraines         CC No referring provider defined for this encounter. on close of this encounter.      Drug Administration Record    Prior to injection, verified patient identity using patient's name and date of birth.  Due to injection administration, patient instructed to remain in clinic for 15 minutes  afterwards, and to report any adverse reaction to me immediately.    Drug Name: triamcinolone acetonide(kenalog)  Dose: 4mL of triamcinolone 2.5mg/mL, 10mg dose  Route administered: ID  NDC #: 3662-1147-53  Amount of waste(mL):1  Reason for waste: Single use vial    LOT #: [id number]  SITE: scalp  :  [employer]  EXPIRATION DATE: 12/2025

## 2024-01-24 NOTE — PROGRESS NOTES
Drug Administration Record    Prior to injection, verified patient identity using patient's name and date of birth.  Due to injection administration, patient instructed to remain in clinic for 15 minutes  afterwards, and to report any adverse reaction to me immediately.    Drug Name: triamcinolone acetonide(kenalog)  Dose: 4mL of triamcinolone 2.5mg/mL, 10mg dose  Route administered: ID  NDC #: 7328-2414-12  Amount of waste(mL):1  Reason for waste: Single use vial    LOT #: [id number]  SITE: scalp  : [employer]  EXPIRATION DATE: 12/2025

## 2024-01-29 RX ORDER — ERGOCALCIFEROL 1.25 MG/1
50000 CAPSULE, LIQUID FILLED ORAL WEEKLY
Qty: 4 CAPSULE | Refills: 1 | OUTPATIENT
Start: 2024-01-29

## 2024-02-24 ENCOUNTER — HEALTH MAINTENANCE LETTER (OUTPATIENT)
Age: 34
End: 2024-02-24

## 2024-08-01 ENCOUNTER — TELEPHONE (OUTPATIENT)
Dept: OBGYN | Facility: CLINIC | Age: 34
End: 2024-08-01

## 2024-08-01 NOTE — TELEPHONE ENCOUNTER
Patient requested appointment via Directly. Message was sent offering 11 AM with Dr. Panda in [place], but has not been read.    Left message for patient to call back. Please offer her appointment today with Dr. Panda. I did schedule to keep the appointment spot. If it doesn't work, please help her reschedule.  Maribell Manning, [facility]

## 2024-11-13 ENCOUNTER — OFFICE VISIT (OUTPATIENT)
Dept: OBGYN | Facility: CLINIC | Age: 34
End: 2024-11-13

## 2024-11-13 VITALS
WEIGHT: 195.6 LBS | SYSTOLIC BLOOD PRESSURE: 119 MMHG | DIASTOLIC BLOOD PRESSURE: 82 MMHG | HEIGHT: 66 IN | BODY MASS INDEX: 31.43 KG/M2 | OXYGEN SATURATION: 100 % | HEART RATE: 84 BPM

## 2024-11-13 DIAGNOSIS — Z11.3 SCREEN FOR STD (SEXUALLY TRANSMITTED DISEASE): ICD-10-CM

## 2024-11-13 DIAGNOSIS — N89.8 VAGINAL ODOR: ICD-10-CM

## 2024-11-13 DIAGNOSIS — N92.0 MENORRHAGIA WITH REGULAR CYCLE: Primary | ICD-10-CM

## 2024-11-13 DIAGNOSIS — N94.6 DYSMENORRHEA: ICD-10-CM

## 2024-11-13 LAB
CLUE CELLS: ABNORMAL
T PALLIDUM AB SER QL: NONREACTIVE
TRICHOMONAS, WET PREP: ABNORMAL
WBC'S/HIGH POWER FIELD, WET PREP: ABNORMAL
YEAST, WET PREP: ABNORMAL

## 2024-11-13 PROCEDURE — 87210 SMEAR WET MOUNT SALINE/INK: CPT | Performed by: OBSTETRICS & GYNECOLOGY

## 2024-11-13 PROCEDURE — 87491 CHLMYD TRACH DNA AMP PROBE: CPT | Performed by: OBSTETRICS & GYNECOLOGY

## 2024-11-13 PROCEDURE — 99395 PREV VISIT EST AGE 18-39: CPT | Performed by: OBSTETRICS & GYNECOLOGY

## 2024-11-13 PROCEDURE — 86780 TREPONEMA PALLIDUM: CPT | Performed by: OBSTETRICS & GYNECOLOGY

## 2024-11-13 PROCEDURE — 87389 HIV-1 AG W/HIV-1&-2 AB AG IA: CPT | Performed by: OBSTETRICS & GYNECOLOGY

## 2024-11-13 PROCEDURE — 87591 N.GONORRHOEAE DNA AMP PROB: CPT | Performed by: OBSTETRICS & GYNECOLOGY

## 2024-11-13 PROCEDURE — 36415 COLL VENOUS BLD VENIPUNCTURE: CPT | Performed by: OBSTETRICS & GYNECOLOGY

## 2024-11-13 PROCEDURE — 99214 OFFICE O/P EST MOD 30 MIN: CPT | Mod: 25 | Performed by: OBSTETRICS & GYNECOLOGY

## 2024-11-13 PROCEDURE — 99459 PELVIC EXAMINATION: CPT | Performed by: OBSTETRICS & GYNECOLOGY

## 2024-11-13 NOTE — LETTER
2024    Peewee Wilson   1990        To Whom it May Concern;    Please excuse Peewee Wilson from work/school for a healthcare visit on 2024.    Sincerely,        Tommy Panda MD

## 2024-11-13 NOTE — PROGRESS NOTES
Peewee Wilson is a 34 year old female , who presents for annual exam.   No unusual bleeding, no incontinence, or unusual discharge.   She continues to have the menorrhagia and the dysmenorrhea since her tubal ligation in 2019, at the time of the c/section.  She is interested in medical management.    She is currently using the tubal ligation for contraception.    Last cholesterol:   Recent Labs   Lab Test 21  0925   CHOL 183   HDL 76   LDL 97   TRIG 48     Past Medical History:   Diagnosis Date    Asthma     Cervical cancer screening     10/7/09 NIL pap, + HR HPV (not 16 or 18)  9/1/10 NIL Pap, Neg HPV 11 NIL pap 7/3/12 ASCUS pap, neg HPV 3/9/16 NIL Pap, Neg HPV. 19 NIL Pap, Neg HPV. Plan: cotest in 5 yr    History of  section     x 3    Migraines        Past Surgical History:   Procedure Laterality Date     SECTION      x 3 with last c/section including a tubal ligation    COLONOSCOPY N/A 2022    Procedure: COLONOSCOPY, WITH POLYPECTOMY;  Surgeon: Ricardo Mayfield MD;  [place] [facility] [place]    ESOPHAGOSCOPY, GASTROSCOPY, DUODENOSCOPY (EGD), COMBINED N/A 2022    Procedure: ESOPHAGOGASTRODUODENOSCOPY, WITH BIOPSY;  Surgeon: Ricardo Mayfield MD;  [place] [facility] [place]    GYN SURGERY Left 2010    dermoid cyst.     TUBAL LIGATION      with last  section       OB History    Para Term  AB Living   4 3 3 0 1 3   SAB IAB Ectopic Multiple Live Births   0 0 0 0 3      # Outcome Date GA Lbr Shar/2nd Weight Sex Type Anes PTL Lv   4 Term 18 37w3d   M -SEC   RAMEZ   3 Term 11 39w1d  3.685 kg (8 lb 2 oz) F CS-LTranv Spinal  RAMEZ      Name: Sanyla      Apgar1: 7  Apgar5: 8   2 Term 06/04/10 41w3d  3.827 kg (8 lb 7 oz) M CS-LTranv Spinal, EPI  RAMEZ      Name: Ralph      Apgar1: 2  Apgar5: 8   1 AB                Gyn History:  Gynecological History            Patient's last menstrual period was 10/24/2024 (exact date).     STD GC, Chlamydia and  HPV/No PID/No IUD     history of abnormal pap smear:  no  Last pap:   Lab Results   Component Value Date    PAP NIL 12/12/2019           Current Outpatient Medications   Medication Sig Dispense Refill    adapalene (DIFFERIN) 0.3 % external gel Apply a pea-sized amount evenly over the face at nighttime before bed. Stop1 week prior to peel 45 g 11    albuterol (PROAIR HFA/PROVENTIL HFA/VENTOLIN HFA) 108 (90 Base) MCG/ACT inhaler Inhale 2 puffs into the lungs every 4 hours as needed for shortness of breath / dyspnea or wheezing 18 g 0    doxycycline hyclate (VIBRAMYCIN) 100 MG capsule Take 1 capsule (100 mg) by mouth 2 times daily (Patient not taking: Reported on 11/13/2024) 180 capsule 0    ferrous sulfate (FEROSUL) 325 (65 Fe) MG tablet Take every other day (Patient not taking: Reported on 11/13/2024) 60 tablet 1    Fluocinolone Acetonide Scalp (DERMA-SMOOTHE/FS SCALP) 0.01 % OIL oil Apply once daily to the scalp for 4 weeks, then 3 times weekly as neede (Patient not taking: Reported on 11/13/2024) 60 mL 1    fluticasone (FLOVENT HFA) 110 MCG/ACT inhaler Inhale 1 puff into the lungs 2 times daily (Patient not taking: Reported on 11/13/2024) 12 g 1    metroNIDAZOLE (METROCREAM) 0.75 % external cream Apply topically 2 times daily (Patient not taking: Reported on 11/13/2024) 45 g 11    spironolactone (ALDACTONE) 50 MG tablet Take 1 tablet (50 mg) by mouth daily (Patient not taking: Reported on 11/13/2024) 30 tablet 11    vitamin D2 (ERGOCALCIFEROL) 37747 units (1250 mcg) capsule Take 1 capsule (50,000 Units) by mouth once a week (Patient not taking: Reported on 11/13/2024) 4 capsule 1    vitamin D2 (ERGOCALCIFEROL) 17167 units (1250 mcg) capsule Take 1 capsule (50,000 Units) by mouth once a week (Patient not taking: Reported on 11/13/2024) 8 capsule 0       No Known Allergies    Social History     Socioeconomic History    Marital status: Single     Spouse name: Not on file    Number of children: Not on file    Years of  "education: Not on file    Highest education level: Not on file   Occupational History    Not on file   Tobacco Use    Smoking status: Former     Passive exposure: Past    Smokeless tobacco: Never    Tobacco comments:     quit 2019 - was smoking 3 cigarettes daily   Vaping Use    Vaping status: Never Used   Substance and Sexual Activity    Alcohol use: No    Drug use: No    Sexual activity: Yes     Partners: Male     Birth control/protection: Female Surgical     Comment: tubal ligation   Other Topics Concern    Parent/sibling w/ CABG, MI or angioplasty before 65F 55M? No   Social History Narrative    Not on file     Social Drivers of Health     Financial Resource Strain: Not on file   Food Insecurity: Not on file   Transportation Needs: Not on file   Physical Activity: Not on file   Stress: Not on file   Social Connections: Not on file   Interpersonal Safety: Not on file   Housing Stability: Not on file       Family History   Problem Relation Age of Onset    No Known Problems Father     No Known Problems Mother     No Known Problems Sister     No Known Problems Sister     No Known Problems Sister     No Known Problems Sister     No Known Problems Brother     No Known Problems Brother     Diabetes Other         couple aunts- maternal great aunts    Colon Cancer No family hx of     Breast Cancer No family hx of     Coronary Artery Disease No family hx of          ROS:  All negative except as above.      EXAM:  /82 (BP Location: Right arm, Cuff Size: Adult Large)   Pulse 84   Ht 1.67 m (5' 5.75\")   Wt 88.7 kg (195 lb 9.6 oz)   LMP 10/24/2024 (Exact Date)   SpO2 100%   BMI 31.81 kg/m    General:  WNWD female, NAD  Alert  Oriented x 3  Gait:  Normal  Skin:  Normal skin turgor  Neurologic:  CN grossly intact, good sensation.    HEENT:  NC/AT, EOMI  Neck:  No masses palpated, symmetrical, carotids +2/4, no bruits heard  Heart:  RRR  Lungs:  Clear   Breasts:  Symmetrical, no dimpling noted, no masses palpated, no " discharge expressed  Abdomen:  Non-tender, non-distended.  Vulva: No external lesions, normal hair distribution, no adenopathy  BUS:  Normal, no masses noted  Urethra:  No hypermobility noted  Urethral meatus:  No masses noted  Vagina: Moist, pink, no abnormal discharge, well rugated, no lesions  Cervix: Smooth, pink, no visible lesions  Uterus: Normal size, anteverted, non-tender, mobile  Ovaries: No mass, non-tender, mobile  Perianal:  No masses noted.    Extremities:  No clubbing, cyanosis, or edema      ASSESSMENT/PLAN   Annual examination   Menorrhagia, long standing, since tubal ligation in 2018  Dysmenorrhea, long standing, since tubal ligation in 2018  She is interested in the OCPs for menstrual regulation and dysmenorrhea.   OCP use is reviewed with the associated risks and benefits and goals and limitations.  STD testing today  Wet prep for vaginal odor. No yeast, trich or clue cells seen.    Low fat diet, weight bearing exercises and self breast exams on a monthly basis have been recommended.  I have discussed with patient the risks, benefits, medications, treatment options and modalities.   I have instructed the patient to call or schedule a follow-up appointment if any problems.

## 2024-11-14 LAB
C TRACH DNA SPEC QL NAA+PROBE: NEGATIVE
HIV 1+2 AB+HIV1 P24 AG SERPL QL IA: NONREACTIVE
N GONORRHOEA DNA SPEC QL NAA+PROBE: NEGATIVE

## (undated) DEVICE — TUBING SUCTION 12"X1/4" N612

## (undated) DEVICE — KIT ENDO TURNOVER/PROCEDURE CARRY-ON 101822

## (undated) DEVICE — SOL WATER IRRIG 500ML BOTTLE 2F7113

## (undated) DEVICE — ENDO BITE BLOCK ADULT OMNI-BLOC

## (undated) DEVICE — GLOVE EXAM NITRILE LG PF LATEX FREE 5064

## (undated) DEVICE — ENDO TRAP POLYP E-TRAP 00711099

## (undated) DEVICE — ENDO SNARE EXACTO COLD 9MM LOOP 2.4MMX230CM 00711115

## (undated) DEVICE — SUCTION MANIFOLD NEPTUNE 2 SYS 1 PORT 702-025-000

## (undated) DEVICE — GOWN IMPERVIOUS 2XL BLUE

## (undated) DEVICE — SNARE CAPIVATOR ROUND COLD SNR BX10 M00561101

## (undated) DEVICE — SPECIMEN CONTAINER 3OZ W/FORMALIN 59901

## (undated) DEVICE — SYR 30ML SLIP TIP W/O NDL 302833

## (undated) DEVICE — SUCTION CATH AIRLIFE TRI-FLO W/CONTROL PORT 14FR  T60C